# Patient Record
Sex: MALE | Race: WHITE | Employment: OTHER | URBAN - METROPOLITAN AREA
[De-identification: names, ages, dates, MRNs, and addresses within clinical notes are randomized per-mention and may not be internally consistent; named-entity substitution may affect disease eponyms.]

---

## 2017-08-10 ENCOUNTER — OFFICE VISIT (OUTPATIENT)
Dept: FAMILY MEDICINE CLINIC | Age: 62
End: 2017-08-10

## 2017-08-10 VITALS
SYSTOLIC BLOOD PRESSURE: 128 MMHG | BODY MASS INDEX: 30.38 KG/M2 | HEIGHT: 71 IN | WEIGHT: 217 LBS | HEART RATE: 79 BPM | RESPIRATION RATE: 14 BRPM | TEMPERATURE: 98 F | DIASTOLIC BLOOD PRESSURE: 78 MMHG

## 2017-08-10 DIAGNOSIS — Z00.00 ANNUAL PHYSICAL EXAM: ICD-10-CM

## 2017-08-10 DIAGNOSIS — Z12.5 PROSTATE CANCER SCREENING: ICD-10-CM

## 2017-08-10 DIAGNOSIS — N20.0 RENAL STONES: ICD-10-CM

## 2017-08-10 DIAGNOSIS — J30.2 SEASONAL ALLERGIC RHINITIS, UNSPECIFIED ALLERGIC RHINITIS TRIGGER: ICD-10-CM

## 2017-08-10 DIAGNOSIS — M10.9 GOUT OF MULTIPLE SITES, UNSPECIFIED CAUSE, UNSPECIFIED CHRONICITY: ICD-10-CM

## 2017-08-10 DIAGNOSIS — Z00.00 ANNUAL PHYSICAL EXAM: Primary | ICD-10-CM

## 2017-08-10 LAB
ALBUMIN SERPL-MCNC: 4.2 G/DL (ref 3.9–4.9)
ALP BLD-CCNC: 75 U/L (ref 35–104)
ALT SERPL-CCNC: 15 U/L (ref 0–41)
ANION GAP SERPL CALCULATED.3IONS-SCNC: 15 MEQ/L (ref 7–13)
AST SERPL-CCNC: 18 U/L (ref 0–40)
BILIRUB SERPL-MCNC: 1.4 MG/DL (ref 0–1.2)
BILIRUBIN, POC: NORMAL
BLOOD URINE, POC: NORMAL
BUN BLDV-MCNC: 22 MG/DL (ref 8–23)
CALCIUM SERPL-MCNC: 9.2 MG/DL (ref 8.6–10.2)
CHLORIDE BLD-SCNC: 103 MEQ/L (ref 98–107)
CHOLESTEROL, TOTAL: 173 MG/DL (ref 0–199)
CLARITY, POC: CLEAR
CO2: 26 MEQ/L (ref 22–29)
COLOR, POC: YELLOW
CREAT SERPL-MCNC: 0.81 MG/DL (ref 0.7–1.2)
GFR AFRICAN AMERICAN: >60
GFR NON-AFRICAN AMERICAN: >60
GLOBULIN: 2.9 G/DL (ref 2.3–3.5)
GLUCOSE BLD-MCNC: 88 MG/DL (ref 74–109)
GLUCOSE URINE, POC: NORMAL
HDLC SERPL-MCNC: 31 MG/DL (ref 40–59)
KETONES, POC: NORMAL
LDL CHOLESTEROL CALCULATED: 120 MG/DL (ref 0–129)
LEUKOCYTE EST, POC: NORMAL
NITRITE, POC: NORMAL
PH, POC: 6.5
POTASSIUM SERPL-SCNC: 4.5 MEQ/L (ref 3.5–5.1)
PROSTATE SPECIFIC ANTIGEN: 2.13 NG/ML (ref 0–5.4)
PROTEIN, POC: NORMAL
SODIUM BLD-SCNC: 144 MEQ/L (ref 132–144)
SPECIFIC GRAVITY, POC: 1.02
TOTAL PROTEIN: 7.1 G/DL (ref 6.4–8.1)
TRIGL SERPL-MCNC: 109 MG/DL (ref 0–200)
UROBILINOGEN, POC: NORMAL

## 2017-08-10 PROCEDURE — 81002 URINALYSIS NONAUTO W/O SCOPE: CPT | Performed by: FAMILY MEDICINE

## 2017-08-10 PROCEDURE — 99396 PREV VISIT EST AGE 40-64: CPT | Performed by: FAMILY MEDICINE

## 2017-08-10 RX ORDER — ALLOPURINOL 300 MG/1
TABLET ORAL
Qty: 90 TABLET | Refills: 3 | Status: SHIPPED | OUTPATIENT
Start: 2017-08-10 | End: 2018-08-13 | Stop reason: SDUPTHER

## 2017-08-10 RX ORDER — HYDROCHLOROTHIAZIDE 25 MG/1
TABLET ORAL
Qty: 90 TABLET | Refills: 3 | Status: SHIPPED | OUTPATIENT
Start: 2017-08-10 | End: 2018-08-13 | Stop reason: SDUPTHER

## 2017-08-10 RX ORDER — FLUTICASONE PROPIONATE 50 MCG
2 SPRAY, SUSPENSION (ML) NASAL DAILY
Qty: 3 BOTTLE | Refills: 3 | Status: SHIPPED | OUTPATIENT
Start: 2017-08-10 | End: 2018-08-13 | Stop reason: SDUPTHER

## 2017-08-10 ASSESSMENT — ENCOUNTER SYMPTOMS
ABDOMINAL PAIN: 0
BLOOD IN STOOL: 0
SINUS PRESSURE: 0
RHINORRHEA: 1
SORE THROAT: 0
SHORTNESS OF BREATH: 0

## 2017-08-10 ASSESSMENT — PATIENT HEALTH QUESTIONNAIRE - PHQ9
1. LITTLE INTEREST OR PLEASURE IN DOING THINGS: 0
2. FEELING DOWN, DEPRESSED OR HOPELESS: 0
SUM OF ALL RESPONSES TO PHQ QUESTIONS 1-9: 0
SUM OF ALL RESPONSES TO PHQ9 QUESTIONS 1 & 2: 0

## 2017-10-13 ENCOUNTER — OFFICE VISIT (OUTPATIENT)
Dept: FAMILY MEDICINE CLINIC | Age: 62
End: 2017-10-13

## 2017-10-13 VITALS
BODY MASS INDEX: 32.14 KG/M2 | DIASTOLIC BLOOD PRESSURE: 80 MMHG | OXYGEN SATURATION: 98 % | SYSTOLIC BLOOD PRESSURE: 138 MMHG | WEIGHT: 229.6 LBS | TEMPERATURE: 98 F | HEIGHT: 71 IN | HEART RATE: 84 BPM

## 2017-10-13 DIAGNOSIS — M67.441 GANGLION CYST OF JOINT OF FINGER OF RIGHT HAND: ICD-10-CM

## 2017-10-13 DIAGNOSIS — L03.032 ACUTE PARONYCHIA OF TOE OF LEFT FOOT: Primary | ICD-10-CM

## 2017-10-13 PROCEDURE — 3017F COLORECTAL CA SCREEN DOC REV: CPT | Performed by: FAMILY MEDICINE

## 2017-10-13 PROCEDURE — G8484 FLU IMMUNIZE NO ADMIN: HCPCS | Performed by: FAMILY MEDICINE

## 2017-10-13 PROCEDURE — G8417 CALC BMI ABV UP PARAM F/U: HCPCS | Performed by: FAMILY MEDICINE

## 2017-10-13 PROCEDURE — G8427 DOCREV CUR MEDS BY ELIG CLIN: HCPCS | Performed by: FAMILY MEDICINE

## 2017-10-13 PROCEDURE — 99213 OFFICE O/P EST LOW 20 MIN: CPT | Performed by: FAMILY MEDICINE

## 2017-10-13 PROCEDURE — 1036F TOBACCO NON-USER: CPT | Performed by: FAMILY MEDICINE

## 2017-10-13 RX ORDER — CEFADROXIL 500 MG/1
500 CAPSULE ORAL 2 TIMES DAILY
Qty: 14 CAPSULE | Refills: 0 | Status: SHIPPED | OUTPATIENT
Start: 2017-10-13 | End: 2017-10-20

## 2017-10-13 NOTE — PATIENT INSTRUCTIONS
Patient Education        Paronychia: Care Instructions  Your Care Instructions  Paronychia (say \"ppyh-pn-AI-aleta-uh\") is an infection of the skin around a fingernail or toenail. It happens when germs enter through a break in the skin. The doctor may have made a small cut in the infected area to drain the pus. Most cases of paronychia improve in a few days. But watch your symptoms and follow your doctor's advice. Though rare, a mild case can turn into something more serious and infect your entire finger or toe. Also, it is possible for an infection to return. Follow-up care is a key part of your treatment and safety. Be sure to make and go to all appointments, and call your doctor if you are having problems. It's also a good idea to know your test results and keep a list of the medicines you take. How can you care for yourself at home? · If your doctor told you how to care for your infected nail, follow the doctor's instructions. If you did not get instructions, follow this general advice:  ¨ Wash the area with clean water 2 times a day. Don't use hydrogen peroxide or alcohol, which can slow healing. ¨ You may cover the area with a thin layer of petroleum jelly, such as Vaseline, and a nonstick bandage. ¨ Apply more petroleum jelly and replace the bandage as needed. · If your doctor prescribed antibiotics, take them as directed. Do not stop taking them just because you feel better. You need to take the full course of antibiotics. · Take an over-the-counter pain medicine, such as acetaminophen (Tylenol), ibuprofen (Advil, Motrin), or naproxen (Aleve). Read and follow all instructions on the label. · Do not take two or more pain medicines at the same time unless the doctor told you to. Many pain medicines have acetaminophen, which is Tylenol. Too much acetaminophen (Tylenol) can be harmful. · Prop up the toe or finger so that it is higher than the level of your heart.  This will help with pain and

## 2018-02-19 ENCOUNTER — HOSPITAL ENCOUNTER (OUTPATIENT)
Dept: GENERAL RADIOLOGY | Age: 63
Discharge: HOME | End: 2018-02-21
Payer: COMMERCIAL

## 2018-02-19 ENCOUNTER — OFFICE VISIT (OUTPATIENT)
Dept: FAMILY MEDICINE CLINIC | Age: 63
End: 2018-02-19
Payer: COMMERCIAL

## 2018-02-19 DIAGNOSIS — M54.17 LUMBOSACRAL RADICULOPATHY AT S1: ICD-10-CM

## 2018-02-19 DIAGNOSIS — M47.27 LUMBOSACRAL RADICULOPATHY DUE TO DEGENERATIVE JOINT DISEASE OF SPINE: ICD-10-CM

## 2018-02-19 DIAGNOSIS — M47.27 LUMBOSACRAL RADICULOPATHY DUE TO DEGENERATIVE JOINT DISEASE OF SPINE: Primary | ICD-10-CM

## 2018-02-19 DIAGNOSIS — G89.29 CHRONIC LEFT-SIDED LOW BACK PAIN WITH SCIATICA, SCIATICA LATERALITY UNSPECIFIED: ICD-10-CM

## 2018-02-19 DIAGNOSIS — M54.40 CHRONIC LEFT-SIDED LOW BACK PAIN WITH SCIATICA, SCIATICA LATERALITY UNSPECIFIED: ICD-10-CM

## 2018-02-19 PROCEDURE — G8427 DOCREV CUR MEDS BY ELIG CLIN: HCPCS | Performed by: FAMILY MEDICINE

## 2018-02-19 PROCEDURE — 72110 X-RAY EXAM L-2 SPINE 4/>VWS: CPT

## 2018-02-19 PROCEDURE — 99213 OFFICE O/P EST LOW 20 MIN: CPT | Performed by: FAMILY MEDICINE

## 2018-02-19 PROCEDURE — 3017F COLORECTAL CA SCREEN DOC REV: CPT | Performed by: FAMILY MEDICINE

## 2018-02-19 PROCEDURE — G8484 FLU IMMUNIZE NO ADMIN: HCPCS | Performed by: FAMILY MEDICINE

## 2018-02-19 PROCEDURE — 1036F TOBACCO NON-USER: CPT | Performed by: FAMILY MEDICINE

## 2018-02-19 PROCEDURE — G8417 CALC BMI ABV UP PARAM F/U: HCPCS | Performed by: FAMILY MEDICINE

## 2018-02-19 RX ORDER — EFINACONAZOLE 100 MG/ML
SOLUTION TOPICAL
Refills: 3 | COMMUNITY
Start: 2018-02-03

## 2018-02-19 RX ORDER — GABAPENTIN 300 MG/1
CAPSULE ORAL
Qty: 30 CAPSULE | Refills: 2 | Status: SHIPPED | OUTPATIENT
Start: 2018-02-19 | End: 2018-03-29 | Stop reason: SDUPTHER

## 2018-02-19 RX ORDER — PREDNISONE 20 MG/1
TABLET ORAL
Qty: 18 TABLET | Refills: 0 | Status: SHIPPED | OUTPATIENT
Start: 2018-02-19 | End: 2018-03-29 | Stop reason: ALTCHOICE

## 2018-02-20 ENCOUNTER — PATIENT MESSAGE (OUTPATIENT)
Dept: FAMILY MEDICINE CLINIC | Age: 63
End: 2018-02-20

## 2018-02-20 DIAGNOSIS — Z23 NEED FOR SHINGLES VACCINE: ICD-10-CM

## 2018-02-20 DIAGNOSIS — Z23 NEED FOR TDAP VACCINATION: Primary | ICD-10-CM

## 2018-02-21 NOTE — TELEPHONE ENCOUNTER
From: Nat Guzman  To: Ritu Paulino DO  Sent: 2/20/2018 7:13 PM EST  Subject: Non-Urgent Medical Question     I have a follow up appointment with Dr Cedric Nielsen on March 29. I see that Im due for hepatitis C screening, a tetanus booster, and I dont know exactly what this shingles vaccine is? Is that something you can also address on the 29th?  Nat Guzman

## 2018-02-26 VITALS
TEMPERATURE: 97.9 F | HEART RATE: 86 BPM | WEIGHT: 210 LBS | SYSTOLIC BLOOD PRESSURE: 132 MMHG | HEIGHT: 71 IN | BODY MASS INDEX: 29.4 KG/M2 | RESPIRATION RATE: 16 BRPM | DIASTOLIC BLOOD PRESSURE: 82 MMHG

## 2018-02-26 ASSESSMENT — ENCOUNTER SYMPTOMS
BLOOD IN STOOL: 0
DIARRHEA: 0
SHORTNESS OF BREATH: 0
ABDOMINAL PAIN: 0
BACK PAIN: 1

## 2018-02-28 ENCOUNTER — HOSPITAL ENCOUNTER (OUTPATIENT)
Dept: PHYSICAL THERAPY | Age: 63
Setting detail: THERAPIES SERIES
Discharge: HOME | End: 2018-02-28
Payer: COMMERCIAL

## 2018-02-28 PROCEDURE — 97162 PT EVAL MOD COMPLEX 30 MIN: CPT

## 2018-02-28 PROCEDURE — 97110 THERAPEUTIC EXERCISES: CPT

## 2018-02-28 ASSESSMENT — PAIN DESCRIPTION - LOCATION: LOCATION: BACK;LEG

## 2018-02-28 ASSESSMENT — PAIN DESCRIPTION - PAIN TYPE: TYPE: CHRONIC PAIN

## 2018-02-28 ASSESSMENT — PAIN SCALES - GENERAL: PAINLEVEL_OUTOF10: 3

## 2018-02-28 ASSESSMENT — PAIN DESCRIPTION - DESCRIPTORS: DESCRIPTORS: ACHING;DULL

## 2018-02-28 ASSESSMENT — PAIN DESCRIPTION - ORIENTATION: ORIENTATION: LEFT

## 2018-02-28 ASSESSMENT — PAIN DESCRIPTION - FREQUENCY: FREQUENCY: CONTINUOUS

## 2018-02-28 NOTE — PLAN OF CARE
[address]     Ph: [phone]  Fax: [phone]    [] Certification  [] Recertification [x]  Plan of Care  [] Progress Note [] Discharge      To:  Referring Practitioner: Dr. Corinna Bah        From:  Matthew Mireles, PT  Patient: Enrique Prasad [facility]          : 1955  Diagnosis: lumbosacral radiculopathy due to DJD of spine, chronic left sided LBP with scicatica             Date: 2018  Treatment Diagnosis: LBP, Lt LE pain, decreased ROM, LE weakness       Progress Report Period from:  2018  to 2018    Total # of Visits to Date: 1   No Show: 0    Canceled Appointment: 0     OBJECTIVE:   Short Term Goals - Time Frame for Short term goals: 3 wks     Goals Current/Discharge status  Met   Short term goal 1: Independent with HEP   Need for HEP  [] yes  [] no   Short term goal 2: Report 50% improvement with symptoms to improve ability to stand at school and prepare meals   C/o symptoms with prolonged static stand and increased activity  [] yes  [] no     Long Term Goals - Time Frame for Long term goals : 5 wks   Goals Current/ Discharge status Met   Long term goal 1: Improve LE strength >/= 4+/5 to allow increased ease with prolonged standing  Strength RLE  Strength RLE: Exception  R Hip Flexion: 4+/5  R Hip Extension: 4+/5  R Hip ABduction: 4+/5  R Hip Internal Rotation: 4+/5  R Hip External Rotation: 4+/5  R Knee Flexion: 5/5  R Knee Extension: 5/5  R Ankle Dorsiflexion: 5/5  Strength LLE  Strength LLE: Exception  L Hip Flexion: 4+/5  L Hip Extension: 4+/5  L Hip ABduction: 4+/5  L Hip Internal Rotation: 4+/5  L Hip External Rotation: 4+/5  L Knee Flexion: 5/5  L Knee Extension: 5/5  L Ankle Dorsiflexion: 5/5     [] yes  [] no   Long term goal 2: Improve LE and lumbar ROM WFL to allow increased ease with bending and lifting  PROM RLE (degrees)  RLE PROM: Exceptions  R SLR: 71  R Hip External Rotation 0-45: 31  R Hip Internal Rotation 0-45: 34     PROM LLE (degrees)  LLE PROM: Exceptions  L SLR: 60  L Hip External Rotation 0-45: 31  L Hip Internal Rotation 0-45: 35     PROM RUE (degrees)  RUE PROM: Exceptions      Spine  Lumbar: ext 5 with increased pain, lumbar 54, Rt SB 45, Lt SB 34  [] yes  [] no   Long term goal 3: Pt will demonstrate proper body mechanics for lifting and gardening  Need for education [] yes  [] no   Long term goal 4: Oswestry </= 4 to demonstrate improved tolerance to functional activities  Oswestry: 12  [] yes  [] no       Body structures, Functions, Activity limitations: Decreased functional mobility , Decreased ROM, Decreased strength, Decreased coordination  Assessment: Pt presents with LBP and LE pain Lt > Rt. Reports improving since onset of Prednisone pack and Gabapentin. Concerned about functional limitations due to pain and limited ROM. Noted generalized tightness and decreased ROM. 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  Increased pain with standing extension, but does report relief with walking. Worsening of symptoms with prolonged standign. Also reports injury to Rt calf, which was untreated and h/o Lt fibular fx. Pt would benefit from skilled PT to address deficits. Prognosis: Good    New Education Provided: HEP, POC   G-Codes     PLAN: [x] Evaluate and Treat  Frequency/Duration:  Plan  Times per week: 2  Plan weeks: 5  Current Treatment Recommendations: Strengthening, ROM, Manual Therapy - Soft Tissue Mobilization, Manual Therapy - Joint Manipulation, Home Exercise Program, Safety Education & Training, Modalities, Integrated Dry Needling  Plan Comment: Transfer care of pt to Alexander Elma, PT      Precautions:             ?     Patient Status:[x] Continue/ Initiate plan of Care    [] Discharge PT. Recommend pt continue with HEP.      [] Additional visits requested, Please re-certify for additional visits:        Signature: Electronically signed by Keysha Aguillon, PT on 2/28/18 at 5:15 PM    If you have any questions or concerns, please don't hesitate to call. Thank you for your referral.    I have reviewed this plan of care and certify a need for medically necessary rehabilitation services.     Physician Signature:__________________________________________________________  Date:  Please sign and return

## 2018-02-28 NOTE — PROGRESS NOTES
current facility-administered medications on file prior to encounter. Subjective  Subjective: Pt reports onset of pain in May. Saw chiropractor who \"cracked\" lower back prior to onset of pain. Saw chiropractor after onset of pain through the end of the year, consisting of massage and manual.  Sharp pains in LEs, which were excruciating. States difficulty with ambulation at times. Pt reports some relief with pressure on Lt fibular head. Some temporary relief with stretches. Pt advised to wear bilateral compression stockings with no difference. Given steroid pack with some relief. Taking Gabapentin with sig relief of sharp pains. Now noticing dull ache in low back. Comments: Pt reports Lt distal fibula fx 30 yrs ago. 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 Pt reports \"rolled up\" gastroc, but did not seek medical attention.    Additional Pertinent Hx: gout  Pain Screening  Patient Currently in Pain: Yes  Pain Assessment  Pain Assessment: 0-10  Pain Level: 3 (Range: 1-6/10)  Pain Type: Chronic pain  Pain Location: Back, Leg  Pain Orientation: Left  Pain Radiating Towards: posterior left knee, iliac crests, fibular head, Lt plantar surface of foot   Pain Descriptors: Aching, Dull (formerly sharp )  Pain Frequency: Continuous  Effect of Pain on Daily Activities: difficulty with prolonged static stand; relief with prolonged walking   Pain Intervention(s): Medication (see eMar), Heat applied (steroid pack, Gabapentin )    Social/Functional History  Lives With: Spouse  Type of Home: House  Home Layout: Two level  ADL Assistance: Independent  Homemaking Assistance: Independent (difficulty with prolonged standing to cook, working in [employer] )  [employer] Responsibilities: Yes  Ambulation Assistance: Independent  Transfer Assistance: Independent  Active : Yes  Mode of Transportation: Car  Occupation: Full time employment  Type of occupation: high school    Leisure & Hobbies: wood shop, gardening   Additional Comments: difficulty with initial sit to stand after prolonged sitting          Objective  Vision  Vision: Within Functional Limits  Hearing  Hearing: Within functional limits (some diff with differentiating noises )  Observation/Palpation  Posture: Fair (Lt concavity with elevated Lt iliac crest and lower Lt inferior border of scap, rounded shoulders with Lt scapular winging )  Palpation: tenderness T12/ L1, Lt PSIS, Lt gluteal/ piriformis area; some discomfort with Lt pelvic rotation - denies with compression and distraction  Observation: overall tightness, difficulty relaxing     Strength RLE  Strength RLE: Exception  R Hip Flexion: 4+/5  R Hip Extension: 4+/5  R Hip ABduction: 4+/5  R Hip Internal Rotation: 4+/5  R Hip External Rotation: 4+/5  R Knee Flexion: 5/5  R Knee Extension: 5/5  R Ankle Dorsiflexion: 5/5  Strength LLE  Strength LLE: Exception  L Hip Flexion: 4+/5  L Hip Extension: 4+/5  L Hip ABduction: 4+/5  L Hip Internal Rotation: 4+/5  L Hip External Rotation: 4+/5  L Knee Flexion: 5/5  L Knee Extension: 5/5  L Ankle Dorsiflexion: 5/5  PROM RLE (degrees)  RLE PROM: Exceptions  R SLR: 71  R Hip External Rotation 0-45: 31  R Hip Internal Rotation 0-45: 34     PROM LLE (degrees)  LLE PROM: Exceptions  L SLR: 60  L Hip External Rotation 0-45: 31  L Hip Internal Rotation 0-45: 35     PROM RUE (degrees)  RUE PROM: Exceptions           Spine  Lumbar: ext 5 with increased pain, lumbar 54, Rt SB 45, Lt SB 34   Joint Mobility  Spine: decreased pelvic mobility, decreased PA mobility                 Sensation  Overall Sensation Status: Impaired (some c/o N&T with stretches in Lt foot/ toe )   Bed mobility  Rolling to Left: Independent  Rolling to Right: Independent  Supine to Sit: Independent  Sit to Supine: Independent     Transfers  Sit to Stand: Independent  Stand to sit:  Independent  Ambulation 1  Surface: carpet  Device: No Device  Assistance: Independent  Quality of Gait: mild increased DANG with decreased trunk rotation and arm swing  Distance: unlimited distance within clinic         Balance  Sitting - Static: Good  Sitting - Dynamic: Good  Standing - Static: Good  Standing - Dynamic: Good             Exercises:   Exercises  Exercise 1: Pt performs TFL stretch in supine, supine pifiromis str; advised to avoid standing hams stretch reaching to floor   Exercise 2: LTR 5s/10   Exercise 3: pelvic tilts 5s/10   Exercise 4: seated piriformis str   Exercise 5: ** hams str   Exercise 6: ** 3 way SLR  Exercise 7: ** SKTC, DKTC   Exercise 20: HEP: pelvic tilts, LTR, seated piriformis str     Manual:  Manual therapy  Joint mobilization: ** Gr I, II PA lumbar mobs   PROM: ** hams str, nerve glides      ** indicates treatment to be performed at future treatment     POST-PAIN    Pain Rating (0-10 pain scale): 4-5  Location and Pain Description same as pre-pain unless otherwise indicated. Action: [] NA  [] Call Physician  [x] Perform HEP  [x] Meds as prescribed     Assessment   Conditions Requiring Skilled Therapeutic Intervention  Body structures, Functions, Activity limitations: Decreased functional mobility , Decreased ROM, Decreased strength, Decreased coordination  Assessment: Pt presents with LBP and LE pain Lt > Rt. Reports improving since onset of Prednisone pack and Gabapentin. Concerned about functional limitations due to pain and limited ROM. Noted generalized tightness and decreased ROM. 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  Increased pain with standing extension, but does report relief with walking. Worsening of symptoms with prolonged standign. Also reports injury to Rt calf, which was untreated and h/o Lt fibular fx. Pt would benefit from skilled PT to address deficits.     Treatment Diagnosis: LBP, Lt LE pain, decreased ROM, LE weakness  Prognosis: Good  Decision Making: Medium Complexity  History: personal Score   History of Falls [] Yes  [x] No 25  0 0   Secondary Diagnosis [] Yes  [x] No 15  0 0   Ambulatory Aid [] Furniture  [] Crutches/cane/walker  [x] None/bedrest/wheelchair/nurse 30  15  0 0   IV/Heparin Lock [] Yes  [x] No 20  0 0   Gait/Transferring [] Impaired  [] Weak  [x] Normal/bedrest/immobile 20  10  0 0   Mental Status [] Forgets limitations  [x] Oriented to own ability 15  0 0      Total: 0     Based on the Assessment score: check the appropriate box.   [x]  No intervention needed   Low =   Score of 0-24  []  Use standard prevention interventions Moderate =  Score of 24-44   [] Discuss fall prevention strategies   [] Indicate moderate falls risk on eval  []  Use high risk prevention interventions High = Score of 45 and higher   [] Discuss fall prevention strategies   [] Provide supervision during treatment time

## 2018-03-05 ENCOUNTER — HOSPITAL ENCOUNTER (OUTPATIENT)
Dept: PHYSICAL THERAPY | Age: 63
Setting detail: THERAPIES SERIES
Discharge: HOME | End: 2018-03-05
Payer: COMMERCIAL

## 2018-03-05 PROCEDURE — 97110 THERAPEUTIC EXERCISES: CPT

## 2018-03-05 ASSESSMENT — PAIN SCALES - GENERAL: PAINLEVEL_OUTOF10: 3

## 2018-03-05 ASSESSMENT — PAIN DESCRIPTION - PAIN TYPE: TYPE: ACUTE PAIN

## 2018-03-05 ASSESSMENT — PAIN DESCRIPTION - LOCATION: LOCATION: BACK

## 2018-03-05 ASSESSMENT — PAIN DESCRIPTION - ORIENTATION: ORIENTATION: LOWER

## 2018-03-09 ENCOUNTER — HOSPITAL ENCOUNTER (OUTPATIENT)
Dept: PHYSICAL THERAPY | Age: 63
Setting detail: THERAPIES SERIES
Discharge: HOME | End: 2018-03-09
Payer: COMMERCIAL

## 2018-03-09 PROCEDURE — 97110 THERAPEUTIC EXERCISES: CPT

## 2018-03-09 ASSESSMENT — PAIN SCALES - GENERAL: PAINLEVEL_OUTOF10: 8

## 2018-03-09 ASSESSMENT — PAIN DESCRIPTION - ORIENTATION: ORIENTATION: LOWER;RIGHT;LEFT

## 2018-03-09 ASSESSMENT — PAIN DESCRIPTION - DESCRIPTORS: DESCRIPTORS: CRAMPING;TIGHTNESS

## 2018-03-09 ASSESSMENT — PAIN DESCRIPTION - PAIN TYPE: TYPE: CHRONIC PAIN

## 2018-03-09 ASSESSMENT — PAIN DESCRIPTION - LOCATION: LOCATION: LEG

## 2018-03-12 ENCOUNTER — HOSPITAL ENCOUNTER (OUTPATIENT)
Dept: PHYSICAL THERAPY | Age: 63
Setting detail: THERAPIES SERIES
Discharge: HOME | End: 2018-03-12
Payer: COMMERCIAL

## 2018-03-12 PROCEDURE — 97110 THERAPEUTIC EXERCISES: CPT

## 2018-03-12 ASSESSMENT — PAIN DESCRIPTION - FREQUENCY: FREQUENCY: CONTINUOUS

## 2018-03-12 ASSESSMENT — PAIN DESCRIPTION - DESCRIPTORS: DESCRIPTORS: CRAMPING;TIGHTNESS

## 2018-03-12 ASSESSMENT — PAIN SCALES - GENERAL: PAINLEVEL_OUTOF10: 5

## 2018-03-12 ASSESSMENT — PAIN DESCRIPTION - ORIENTATION: ORIENTATION: RIGHT;LEFT;LOWER

## 2018-03-12 ASSESSMENT — PAIN DESCRIPTION - LOCATION: LOCATION: LEG;BACK

## 2018-03-12 ASSESSMENT — PAIN DESCRIPTION - PAIN TYPE: TYPE: CHRONIC PAIN

## 2018-03-12 NOTE — PROGRESS NOTES
[id number] [address]  Outpatient Physical Therapy    Treatment Note        Date: 3/12/2018  Patient: Trenton Barnett  : 1955  ACCT #: [de-identified]  Referring Practitioner: Dr. Shannan Bill   Diagnosis: lumbosacral radiculopathy due to DJD of spine, chronic left sided LBP with scicatica     Visit Information:  PT Visit Information  PT Insurance Information: MMO  Total # of Visits Approved:  (medical necessity, $25 copay )  Total # of Visits to Date: 4  No Show: 0  Canceled Appointment: 0  Progress Note Counter: 4/10     Subjective: Pain in  and LB 2/10. Pt states he saw his xray results for his LB and has an appt with the dr. Romana Aris Compliance:  [x] Good [] Fair [] Poor [] Reports not doing due to:    Vital Signs  Patient Currently in Pain: Yes   Pain Screening  Patient Currently in Pain: Yes  Pain Assessment  Pain Assessment: 0-10  Pain Level: 5  Pain Type: Chronic pain  Pain Location: Leg;Back  Pain Orientation: Right;Left;Lower  Pain Descriptors: Cramping;Tightness  Pain Frequency: Continuous    OBJECTIVE:   Exercises  Exercise 1: supine with one LE crossed over other, shoulders remain flat on mat, 3 x 20 sec  Exercise 2: LTR 10s/10   Exercise 3: pelvic tilts 5s/15   Exercise 4:  piriformis str knee to opposite shld 3x30\"   Exercise 5:  hamstring stretch 3x30, longsit  Exercise 6: 3 way SLR x10 ea   Exercise 7: SKTC, DKTC  20\"x 3  Exercise 8: modified zackery stretch 3x30\"   Exercise 20: HEP: SKTC, DKTC      Strength: [x] NT  [] MMT completed:     ROM: [x] NT  [] ROM measurements:      Manual: n/a       Modalities: n/a        *Indicates exercise, modality, or manual techniques to be initiated when appropriate    Assessment: Body structures, Functions, Activity limitations: Decreased functional mobility , Decreased ROM, Decreased strength, Decreased coordination  Assessment: Pt good tolerance to ex with verbal cues. Pain increased with Mod Zackery stretch in Lt LB/hip.  Initiated Carol Ann Sage 45 to

## 2018-03-15 ENCOUNTER — HOSPITAL ENCOUNTER (OUTPATIENT)
Dept: PHYSICAL THERAPY | Age: 63
Setting detail: THERAPIES SERIES
Discharge: HOME | End: 2018-03-15
Payer: COMMERCIAL

## 2018-03-15 PROCEDURE — 97110 THERAPEUTIC EXERCISES: CPT

## 2018-03-15 ASSESSMENT — PAIN DESCRIPTION - DESCRIPTORS: DESCRIPTORS: TIGHTNESS;CRAMPING

## 2018-03-15 ASSESSMENT — PAIN DESCRIPTION - FREQUENCY: FREQUENCY: INTERMITTENT

## 2018-03-15 ASSESSMENT — PAIN DESCRIPTION - PAIN TYPE: TYPE: CHRONIC PAIN

## 2018-03-15 ASSESSMENT — PAIN SCALES - GENERAL: PAINLEVEL_OUTOF10: 9

## 2018-03-19 ENCOUNTER — HOSPITAL ENCOUNTER (OUTPATIENT)
Dept: PHYSICAL THERAPY | Age: 63
Setting detail: THERAPIES SERIES
Discharge: HOME | End: 2018-03-19
Payer: COMMERCIAL

## 2018-03-19 PROCEDURE — 97110 THERAPEUTIC EXERCISES: CPT

## 2018-03-19 PROCEDURE — 97035 APP MDLTY 1+ULTRASOUND EA 15: CPT

## 2018-03-19 ASSESSMENT — PAIN DESCRIPTION - LOCATION: LOCATION: LEG;BACK

## 2018-03-19 ASSESSMENT — PAIN DESCRIPTION - DESCRIPTORS: DESCRIPTORS: STABBING;SHOOTING;TIGHTNESS

## 2018-03-19 ASSESSMENT — PAIN DESCRIPTION - PAIN TYPE: TYPE: CHRONIC PAIN

## 2018-03-19 NOTE — PROGRESS NOTES
[id number] [address]  Outpatient Physical Therapy    Treatment Note        Date: 3/19/2018  Patient: Steven Khan  : 1955  ACCT #: [de-identified]  Referring Practitioner: Dr. Bhargav Davidson   Diagnosis: lumbosacral radiculopathy due to DJD of spine, chronic left sided LBP with scicatica     Visit Information:  PT Visit Information  PT Insurance Information: MMO  Total # of Visits Approved:  (medical necessity, $25 copay )  Total # of Visits to Date: 6  No Show: 0  Canceled Appointment: 0  Progress Note Counter: 6/10     Subjective: pain is 8/10 in left calf, I still will get spasms radiating if I go into LX ext  Comments: RTD 3  HEP Compliance:  [x] Good [] Fair [] Poor [] Reports not doing due to:    Vital Signs  Patient Currently in Pain: Yes   Pain Screening  Patient Currently in Pain: Yes  Pain Assessment  Pain Type: Chronic pain  Pain Location: Leg;Back  Pain Descriptors: Stabbing; Shooting;Tightness    OBJECTIVE:   Exercises  Exercise 2: LTR 10s/10   Exercise 3: TA breathes 5 sec x 15  Exercise 4: piriformis stretch 3 x 20 sec, b/l, supine  Exercise 13: Ab walkouts x 10  Exercise 14: DLS x 10,  3-way  Exercise 15: gastroc stretch 3 x 30 sec, standing  Exercise 16: planks 3 x 30 sec     Strength: [x] NT  [] MMT completed:   ROM: [x] NT  [] ROM measurements:        Modalities:  Modalities  Ultrasound: U/S 1Mhz at 1.5 w/cm2, left calf, 5 min     *Indicates exercise, modality, or manual techniques to be initiated when appropriate    Assessment:         Body structures, Functions, Activity limitations: Decreased functional mobility , Decreased ROM, Decreased strength, Decreased coordination  Assessment: added core stabilization ex, good technique with planks, and also U/S to left calf, per PT,  reduced pain stated post tx  Treatment Diagnosis: LBP, Lt LE pain, decreased ROM, LE weakness  Prognosis: Good  Patient Education: HEP, POC     Goals:  Short term goals  Time Frame for Short term goals: 3 wks   Short term goal 1: Independent with HEP   Short term goal 2: Report 50% improvement with symptoms to improve ability to stand at school and prepare meals     Long term goals  Time Frame for Long term goals : 5 wks   Long term goal 1: Improve LE strength >/= 4+/5 to allow increased ease with prolonged standing   Long term goal 2: Improve LE and lumbar ROM WFL to allow increased ease with bending and lifting   Long term goal 3: Pt will demonstrate proper body mechanics for lifting and gardening   Long term goal 4: Oswestry </= 4 to demonstrate improved tolerance to functional activities   Progress toward goals:ongoing    POST-PAIN       Pain Rating (0-10 pain scale):  4 /10   Location and pain description same as pre-treatment unless indicated. Action: [] NA   [x] Perform HEP  [] Meds as prescribed  [] Modalities as prescribed   [] Call Physician     Frequency/Duration:  Plan  Times per week: 2  Plan weeks: 5  Current Treatment Recommendations: Strengthening, ROM, Manual Therapy - Soft Tissue Mobilization, Manual Therapy - Joint Manipulation, Home Exercise Program, Safety Education & Training, Modalities, Integrated Dry Needling  Plan Comment: Transfer care of pt to Seth Cormeir, PT      Pt to continue current HEP. See objective section for any therapeutic exercise changes, additions or modifications this date.          PT Individual Minutes  Time In: 3975  Time Out: 6028  Minutes: 38  Timed Code Treatment Minutes: 38 Minutes  Procedure Minutes:0    Signature:  Electronically signed by Tolu Starkey PTA on 3/19/18 at 4:23 PM

## 2018-03-22 ENCOUNTER — HOSPITAL ENCOUNTER (OUTPATIENT)
Dept: PHYSICAL THERAPY | Age: 63
Setting detail: THERAPIES SERIES
Discharge: HOME | End: 2018-03-22
Payer: COMMERCIAL

## 2018-03-22 PROCEDURE — 97035 APP MDLTY 1+ULTRASOUND EA 15: CPT

## 2018-03-22 PROCEDURE — 97110 THERAPEUTIC EXERCISES: CPT

## 2018-03-22 PROCEDURE — 97140 MANUAL THERAPY 1/> REGIONS: CPT

## 2018-03-22 ASSESSMENT — PAIN DESCRIPTION - PAIN TYPE: TYPE: CHRONIC PAIN

## 2018-03-22 ASSESSMENT — PAIN SCALES - GENERAL: PAINLEVEL_OUTOF10: 7

## 2018-03-22 ASSESSMENT — PAIN DESCRIPTION - DESCRIPTORS: DESCRIPTORS: SHOOTING

## 2018-03-22 ASSESSMENT — PAIN DESCRIPTION - ORIENTATION: ORIENTATION: LEFT

## 2018-03-22 ASSESSMENT — PAIN DESCRIPTION - LOCATION: LOCATION: BACK;LEG

## 2018-03-26 ENCOUNTER — HOSPITAL ENCOUNTER (OUTPATIENT)
Dept: PHYSICAL THERAPY | Age: 63
Setting detail: THERAPIES SERIES
Discharge: HOME | End: 2018-03-26
Payer: COMMERCIAL

## 2018-03-26 PROCEDURE — 97140 MANUAL THERAPY 1/> REGIONS: CPT

## 2018-03-26 PROCEDURE — 97035 APP MDLTY 1+ULTRASOUND EA 15: CPT

## 2018-03-26 PROCEDURE — 97110 THERAPEUTIC EXERCISES: CPT

## 2018-03-26 ASSESSMENT — PAIN DESCRIPTION - DESCRIPTORS: DESCRIPTORS: SORE

## 2018-03-26 ASSESSMENT — PAIN DESCRIPTION - LOCATION: LOCATION: BACK;LEG

## 2018-03-26 ASSESSMENT — PAIN DESCRIPTION - PAIN TYPE: TYPE: CHRONIC PAIN

## 2018-03-26 ASSESSMENT — PAIN SCALES - GENERAL: PAINLEVEL_OUTOF10: 4

## 2018-03-26 ASSESSMENT — PAIN DESCRIPTION - ORIENTATION: ORIENTATION: LEFT;RIGHT;LOWER

## 2018-03-26 NOTE — PROGRESS NOTES
[id number] [address]  Outpatient Physical Therapy    Treatment Note        Date: 3/26/2018  Patient: Nat Guzman  : 1955  ACCT #: [de-identified]  Referring Practitioner: Dr. Ritu Paulino   Diagnosis: lumbosacral radiculopathy due to DJD of spine, chronic left sided LBP with scicatica     Visit Information:  PT Visit Information  PT Insurance Information: MMO  Total # of Visits Approved:  (medical necessity, $25 copay )  Total # of Visits to Date: 8  No Show: 0  Canceled Appointment: 0  Progress Note Counter: 8/10     Subjective: pain is 2-3 in LB, and 4/10 b/l LE's, calf, I think the needling helped for about a day, I would like to try it again, and I bought a knee brace  Comments: RTD 3-  HEP Compliance:  [x] Good [] Fair [] Poor [] Reports not doing due to:    Vital Signs  Patient Currently in Pain: Yes   Pain Screening  Patient Currently in Pain: Yes  Pain Assessment  Pain Level: 4  Pain Type: Chronic pain  Pain Location: Back;Leg  Pain Orientation: Left;Right; Lower  Pain Descriptors: Sore    OBJECTIVE:   Exercises  Exercise 4: piriformis stretch 3 x 20 sec, b/l, seated  Exercise 13:  Ab walkouts x 15  Exercise 14: DLS x 10,  3-way       Strength: [] NT  [x] MMT completed:  Strength RLE  R Hip Flexion: 5/5  R Hip Extension: NT  R Hip ABduction: 4+/5  R Hip Internal Rotation: 4/5  R Hip External Rotation: 4+/5  R Knee Flexion: 4+/5  R Knee Extension: 5/5  Strength LLE  L Hip Flexion: 5/5  L Hip Extension: NT  L Hip ABduction: 4+/5  L Hip Internal Rotation: 4/5  L Hip External Rotation: 4+/5  L Knee Flexion: 4+/5  L Knee Extension: 5/5             ROM: [] NT  [x] ROM measurements:     AROM RLE (degrees)  RLE General AROM: SLR 68 deg     AROM LLE (degrees)  LLE General AROM: SLR 82 deg              Spine  Lumbar: ext, NT, flexion-WNL, SB-superior patella, b/l  Manual:   Manual therapy  Other: IDN to normalize tissue dysfunction and improve NM mechanics (see homeostatic chart to be scanned into EMR upon D/C

## 2018-03-26 NOTE — PROGRESS NOTES
Nicole Meneses [address]     Ph: [phone]  Fax: [phone]    [] Certification  [] Recertification []  Plan of Care  [x] Progress Note [] Discharge      To:  Referring Practitioner: Dr. Federico Rosenthal       From:  Kaushik Sethi, MARVA  Patient: Adam Silveira [facility]     : 1955  Diagnosis: lumbosacral radiculopathy due to DJD of spine, chronic left sided LBP with scicatica      Date: 3/26/2018  Treatment Diagnosis: LBP, Lt LE pain, decreased ROM, LE weakness       Progress Report Period from:  2018  to 3/26/2018    Total # of Visits to Date: 8   No Show: 0    Canceled Appointment: 0     OBJECTIVE:   Short Term Goals - Time Frame for Short term goals: 3 wks     Goals Current/Discharge status  Met   Short term goal 1: Independent with HEP   indep with HEP [x] yes  [] no   Short term goal 2: Report 50% improvement with symptoms to improve ability to stand at school and prepare meals   Some improvement stated, could not be specific [] yes  [x] no     Long Term Goals - Time Frame for Long term goals : 5 wks   Goals Current/ Discharge status Met   Long term goal 1: Improve LE strength >/= 4+/5 to allow increased ease with prolonged standing  Strength RLE  R Hip Flexion: 5/5  R Hip Extension: NT  R Hip ABduction: 4+/5  R Hip Internal Rotation: 4/5  R Hip External Rotation: 4+/5  R Knee Flexion: 4+/5  R Knee Extension: 5/5  Strength LLE  L Hip Flexion: 5/5  L Hip Extension: NT  L Hip ABduction: 4+/5  L Hip Internal Rotation: 4/5  L Hip External Rotation: 4+/5  L Knee Flexion: 4+/5  L Knee Extension: 5/5          [x] yes  [x] no   Long term goal 2: Improve LE and lumbar ROM WFL to allow increased ease with bending and lifting     AROM RLE (degrees)  RLE General AROM: SLR 68 deg     AROM LLE (degrees)  LLE General AROM: SLR 82 deg           Spine  Lumbar: ext, NT, flexion-WNL, SB-superior patella, b/l   [x] yes  [] no   Long term goal 3: Pt will demonstrate proper body mechanics for lifting and gardening  Improved body mechanics  [x] yes  [x] no   Long term goal 4: Oswestry </= 4 to demonstrate improved tolerance to functional activities  Oswestry= 18/50 [] yes  [x] no       Body structures, Functions, Activity limitations: Decreased functional mobility , Decreased ROM, Decreased strength, Decreased coordination    Assessment: Pt has made good progress in LE strength and demos challenge w/ core exs;  IDN was recently initiated w/ pt reporting some improvement. Pt may benefit from cont skilled PT to progress core and hip strengthening and stability and continuing w/ Dry needling to dec mm tension and balance MS mechanics. Prognosis: Good      G-Codes       PLAN: [x]   Frequency/Duration:  Plan  Times per week: 2  Plan weeks: 5 ( + 6 visits added this date w/ PN if needed)  Current Treatment Recommendations: Strengthening, ROM, Manual Therapy - Soft Tissue Mobilization, Manual Therapy - Joint Manipulation, Home Exercise Program, Safety Education & Training, Modalities, Integrated Dry Needling  Plan Comment: Transfer care of pt to Taco Schmidt PT                   ? Patient Status:[x] Continue/ Initiate plan of Care    [] Discharge PT. Recommend pt continue with HEP. [] Additional visits requested, Please re-certify for additional visits:          Signature:objective info by Electronically signed by Lakshmi Castillo PTA on 3/26/18 at 10:23 AM    Electronically signed by Taco Schmidt PT on 3/26/2018 at 12:44 PM    If you have any questions or concerns, please don't hesitate to call. Thank you for your referral.    I have reviewed this plan of care and certify a need for medically necessary rehabilitation services.     Physician Signature:__________________________________________________________  Date:  Please sign and return

## 2018-03-28 ENCOUNTER — HOSPITAL ENCOUNTER (OUTPATIENT)
Dept: PHYSICAL THERAPY | Age: 63
Setting detail: THERAPIES SERIES
Discharge: HOME | End: 2018-03-28
Payer: COMMERCIAL

## 2018-03-28 PROCEDURE — 97140 MANUAL THERAPY 1/> REGIONS: CPT

## 2018-03-28 PROCEDURE — 97035 APP MDLTY 1+ULTRASOUND EA 15: CPT

## 2018-03-28 PROCEDURE — 97110 THERAPEUTIC EXERCISES: CPT

## 2018-03-28 ASSESSMENT — PAIN DESCRIPTION - LOCATION: LOCATION: LEG

## 2018-03-28 ASSESSMENT — PAIN SCALES - GENERAL: PAINLEVEL_OUTOF10: 4

## 2018-03-28 ASSESSMENT — PAIN DESCRIPTION - DESCRIPTORS: DESCRIPTORS: SHOOTING

## 2018-03-28 ASSESSMENT — PAIN DESCRIPTION - PAIN TYPE: TYPE: CHRONIC PAIN

## 2018-03-28 ASSESSMENT — PAIN DESCRIPTION - ORIENTATION: ORIENTATION: RIGHT;LEFT

## 2018-03-28 NOTE — PROGRESS NOTES
LE pain, decreased ROM, LE weakness  Prognosis: Good  Patient Education: HEP, POC     Goals:  Short term goals  Time Frame for Short term goals: 3 wks   Short term goal 1: Independent with HEP   Short term goal 2: Report 50% improvement with symptoms to improve ability to stand at school and prepare meals     Long term goals  Time Frame for Long term goals : 5 wks   Long term goal 1: Improve LE strength >/= 4+/5 to allow increased ease with prolonged standing   Long term goal 2: Improve LE and lumbar ROM WFL to allow increased ease with bending and lifting   Long term goal 3: Pt will demonstrate proper body mechanics for lifting and gardening   Long term goal 4: Oswestry </= 4 to demonstrate improved tolerance to functional activities   Progress toward goals:ongoing    POST-PAIN       Pain Rating (0-10 pain scale):  2 /10   Location and pain description same as pre-treatment unless indicated. Action: [] NA   [x] Perform HEP  [] Meds as prescribed  [] Modalities as prescribed   [] Call Physician     Frequency/Duration:  Plan  Times per week: 2  Plan weeks: 5 ( + 6 visits added this date w/ PN if needed)  Current Treatment Recommendations: Strengthening, ROM, Manual Therapy - Soft Tissue Mobilization, Manual Therapy - Joint Manipulation, Home Exercise Program, Safety Education & Training, Modalities, Integrated Dry Needling  Plan Comment: Transfer care of pt to Seth Cormier PT      Pt to continue current HEP. See objective section for any therapeutic exercise changes, additions or modifications this date.          PT Individual Minutes  Time In: 4115  Time Out: 9144  Minutes: 38  Timed Code Treatment Minutes: 38 Minutes  Procedure Minutes:0    Signature:  Electronically signed by Tolu Starkey PTA on 3/28/18 at 10:33 AM

## 2018-03-29 ENCOUNTER — OFFICE VISIT (OUTPATIENT)
Dept: FAMILY MEDICINE CLINIC | Age: 63
End: 2018-03-29
Payer: COMMERCIAL

## 2018-03-29 VITALS
DIASTOLIC BLOOD PRESSURE: 80 MMHG | BODY MASS INDEX: 31.36 KG/M2 | RESPIRATION RATE: 14 BRPM | TEMPERATURE: 97.8 F | HEIGHT: 71 IN | SYSTOLIC BLOOD PRESSURE: 126 MMHG | WEIGHT: 224 LBS | HEART RATE: 69 BPM

## 2018-03-29 DIAGNOSIS — G89.29 CHRONIC LEFT-SIDED LOW BACK PAIN WITH SCIATICA, SCIATICA LATERALITY UNSPECIFIED: ICD-10-CM

## 2018-03-29 DIAGNOSIS — M47.27 LUMBOSACRAL RADICULOPATHY DUE TO DEGENERATIVE JOINT DISEASE OF SPINE: Primary | ICD-10-CM

## 2018-03-29 DIAGNOSIS — M54.40 CHRONIC LEFT-SIDED LOW BACK PAIN WITH SCIATICA, SCIATICA LATERALITY UNSPECIFIED: ICD-10-CM

## 2018-03-29 DIAGNOSIS — Z23 NEED FOR TDAP VACCINATION: ICD-10-CM

## 2018-03-29 PROCEDURE — 3017F COLORECTAL CA SCREEN DOC REV: CPT | Performed by: FAMILY MEDICINE

## 2018-03-29 PROCEDURE — G8417 CALC BMI ABV UP PARAM F/U: HCPCS | Performed by: FAMILY MEDICINE

## 2018-03-29 PROCEDURE — G8484 FLU IMMUNIZE NO ADMIN: HCPCS | Performed by: FAMILY MEDICINE

## 2018-03-29 PROCEDURE — 99213 OFFICE O/P EST LOW 20 MIN: CPT | Performed by: FAMILY MEDICINE

## 2018-03-29 PROCEDURE — 90471 IMMUNIZATION ADMIN: CPT | Performed by: FAMILY MEDICINE

## 2018-03-29 PROCEDURE — 90715 TDAP VACCINE 7 YRS/> IM: CPT | Performed by: FAMILY MEDICINE

## 2018-03-29 PROCEDURE — 1036F TOBACCO NON-USER: CPT | Performed by: FAMILY MEDICINE

## 2018-03-29 PROCEDURE — G8427 DOCREV CUR MEDS BY ELIG CLIN: HCPCS | Performed by: FAMILY MEDICINE

## 2018-03-29 RX ORDER — GABAPENTIN 300 MG/1
CAPSULE ORAL
Qty: 60 CAPSULE | Refills: 2 | Status: SHIPPED | OUTPATIENT
Start: 2018-03-29 | End: 2018-07-09 | Stop reason: ALTCHOICE

## 2018-03-29 RX ORDER — PREDNISONE 20 MG/1
TABLET ORAL
Qty: 18 TABLET | Refills: 0 | Status: SHIPPED | OUTPATIENT
Start: 2018-03-29 | End: 2018-07-09 | Stop reason: ALTCHOICE

## 2018-04-02 ENCOUNTER — HOSPITAL ENCOUNTER (OUTPATIENT)
Dept: PHYSICAL THERAPY | Age: 63
Setting detail: THERAPIES SERIES
Discharge: HOME | End: 2018-04-02
Payer: COMMERCIAL

## 2018-04-02 PROCEDURE — 97140 MANUAL THERAPY 1/> REGIONS: CPT

## 2018-04-02 PROCEDURE — 97110 THERAPEUTIC EXERCISES: CPT

## 2018-04-02 ASSESSMENT — PAIN SCALES - GENERAL: PAINLEVEL_OUTOF10: 4

## 2018-04-02 ASSESSMENT — PAIN DESCRIPTION - PAIN TYPE: TYPE: CHRONIC PAIN

## 2018-04-02 ASSESSMENT — PAIN DESCRIPTION - ORIENTATION: ORIENTATION: POSTERIOR;LOWER;LEFT

## 2018-04-02 ASSESSMENT — PAIN DESCRIPTION - DESCRIPTORS: DESCRIPTORS: SORE

## 2018-04-02 ASSESSMENT — PAIN DESCRIPTION - LOCATION: LOCATION: LEG

## 2018-04-04 ENCOUNTER — HOSPITAL ENCOUNTER (OUTPATIENT)
Dept: PHYSICAL THERAPY | Age: 63
Setting detail: THERAPIES SERIES
Discharge: HOME | End: 2018-04-04
Payer: COMMERCIAL

## 2018-04-04 PROCEDURE — 97140 MANUAL THERAPY 1/> REGIONS: CPT

## 2018-04-04 PROCEDURE — 97110 THERAPEUTIC EXERCISES: CPT

## 2018-04-04 ASSESSMENT — PAIN SCALES - GENERAL: PAINLEVEL_OUTOF10: 4

## 2018-04-04 ASSESSMENT — PAIN DESCRIPTION - ORIENTATION: ORIENTATION: RIGHT;LEFT;LOWER

## 2018-04-04 ASSESSMENT — PAIN DESCRIPTION - LOCATION: LOCATION: LEG

## 2018-04-04 ASSESSMENT — PAIN DESCRIPTION - PAIN TYPE: TYPE: CHRONIC PAIN

## 2018-04-05 ASSESSMENT — ENCOUNTER SYMPTOMS
ABDOMINAL PAIN: 0
SHORTNESS OF BREATH: 0
VOMITING: 0
BACK PAIN: 1
NAUSEA: 0
BLOOD IN STOOL: 0

## 2018-07-09 ENCOUNTER — OFFICE VISIT (OUTPATIENT)
Dept: FAMILY MEDICINE CLINIC | Age: 63
End: 2018-07-09
Payer: COMMERCIAL

## 2018-07-09 VITALS
OXYGEN SATURATION: 90 % | HEIGHT: 71 IN | TEMPERATURE: 97.6 F | SYSTOLIC BLOOD PRESSURE: 134 MMHG | RESPIRATION RATE: 18 BRPM | DIASTOLIC BLOOD PRESSURE: 70 MMHG | WEIGHT: 220 LBS | HEART RATE: 86 BPM | BODY MASS INDEX: 30.8 KG/M2

## 2018-07-09 DIAGNOSIS — G47.10 HYPERSOMNOLENCE DISORDER, PERSISTENT: Primary | ICD-10-CM

## 2018-07-09 DIAGNOSIS — R53.82 CHRONIC FATIGUE: ICD-10-CM

## 2018-07-09 DIAGNOSIS — R06.89 BREATH HOLDING EPISODES: ICD-10-CM

## 2018-07-09 PROCEDURE — 99213 OFFICE O/P EST LOW 20 MIN: CPT | Performed by: FAMILY MEDICINE

## 2018-07-09 PROCEDURE — G8417 CALC BMI ABV UP PARAM F/U: HCPCS | Performed by: FAMILY MEDICINE

## 2018-07-09 PROCEDURE — 3017F COLORECTAL CA SCREEN DOC REV: CPT | Performed by: FAMILY MEDICINE

## 2018-07-09 PROCEDURE — 1036F TOBACCO NON-USER: CPT | Performed by: FAMILY MEDICINE

## 2018-07-09 PROCEDURE — G8427 DOCREV CUR MEDS BY ELIG CLIN: HCPCS | Performed by: FAMILY MEDICINE

## 2018-07-09 RX ORDER — IBUPROFEN 200 MG
200 TABLET ORAL EVERY 6 HOURS PRN
COMMUNITY

## 2018-07-09 RX ORDER — TRAMADOL HYDROCHLORIDE 50 MG/1
50 TABLET ORAL EVERY 6 HOURS PRN
COMMUNITY

## 2018-07-09 RX ORDER — CETIRIZINE HYDROCHLORIDE 10 MG/1
10 TABLET ORAL DAILY
COMMUNITY

## 2018-07-09 NOTE — PROGRESS NOTES
Subjective  Alisia Lyman, 58 y.o. male presents today with:  Chief Complaint   Patient presents with    Fatigue     wakes up a lot during night, snores, daytime sleepiness, witnessed apneas       HPI  Patient presents today for sleep issues. The patient complains of daytime sleepiness, snoring, waking throughout the the night and states his wife has witnessed apneas. Much more fighting to stay awake at work, and during driving time  NO cp/palp/orta/edema  Rev/rxs; No abuse nor side effects on meds   ; No cardio-pulmonary probs;compliant with diet/rxs;no new gi-gu complaints   Review of Systems   Constitutional: Positive for fatigue. Negative for fever and unexpected weight change. Eyes: Negative for visual disturbance. Respiratory: Negative for cough. Cardiovascular: Negative for chest pain, palpitations and leg swelling. Gastrointestinal: Negative for abdominal pain, blood in stool and nausea. Genitourinary: Negative for flank pain. Musculoskeletal: Positive for myalgias. Negative for arthralgias (no gout x mos). Neurological: Positive for headaches. Negative for seizures, syncope, weakness, light-headedness and numbness. Psychiatric/Behavioral: Positive for decreased concentration. Allergies   Allergen Reactions    Adhesive Tape      hives for one week. :      Morphine     Seasonal     Yeast        Past Medical History:   Diagnosis Date    Gout     Onychomycosis of toenail     LEFT INDEX TOE     Seasonal allergic rhinitis     Uric acid nephrolithiasis      Past Surgical History:   Procedure Laterality Date    COLONOSCOPY  12/15/14        CYST REMOVAL  06/28/2018    DR. WALKER    CYSTOSCOPY  8/20/14    LITHOTRIPSY      X5    SINUS SURGERY  2001     Social History     Social History    Marital status:      Spouse name: N/A    Number of children: N/A    Years of education: N/A     Occupational History    Not on file.      Social History Main Topics    Smoking status: Never Smoker    Smokeless tobacco: Never Used    Alcohol use No    Drug use: Unknown    Sexual activity: Not on file     Other Topics Concern    Not on file     Social History Narrative    No narrative on file     Family History   Problem Relation Age of Onset    Heart Surgery Mother    Valeria Braun Father     Breast Cancer Sister           Current Outpatient Prescriptions on File Prior to Visit   Medication Sig Dispense Refill    aspirin 81 MG tablet Take 81 mg by mouth daily      hydrochlorothiazide (HYDRODIURIL) 25 MG tablet TAKE 1 TABLET DAILY 90 tablet 3    fluticasone (FLONASE) 50 MCG/ACT nasal spray 2 sprays by Nasal route daily 3 Bottle 3    allopurinol (ZYLOPRIM) 300 MG tablet TAKE 1 TABLET DAILY 90 tablet 3    Cholecalciferol (VITAMIN D-3 PO) Take by mouth      POTASSIUM CITRATE PO Take by mouth      JUBLIA 10 % SOLN Apply once daily to affected nails  3     No current facility-administered medications on file prior to visit. Objective    Vitals:    07/09/18 0925   BP: 134/70   Pulse: 86   Resp: 18   Temp: 97.6 °F (36.4 °C)   TempSrc: Temporal   SpO2: 90%   Weight: 220 lb (99.8 kg)   Height: 5' 11\" (1.803 m)     Physical Exam   Constitutional: He is oriented to person, place, and time and well-developed, well-nourished, and in no distress. No distress. Eyes: No scleral icterus. Neck: Normal range of motion. Neck supple. Carotid bruit is not present. No neck rigidity. No thyroid mass and no thyromegaly present. Cardiovascular: Normal rate, regular rhythm, S1 normal, S2 normal and normal heart sounds. No extrasystoles are present. No murmur heard. Pulses:       Dorsalis pedis pulses are 2+ on the right side, and 2+ on the left side. Posterior tibial pulses are 2+ on the right side, and 2+ on the left side. Pulmonary/Chest: Effort normal and breath sounds normal.   Abdominal: Normal appearance. He exhibits no abdominal bruit. There is no hepatosplenomegaly.

## 2018-07-16 ENCOUNTER — TELEPHONE (OUTPATIENT)
Dept: FAMILY MEDICINE CLINIC | Age: 63
End: 2018-07-16

## 2018-07-16 ASSESSMENT — ENCOUNTER SYMPTOMS
NAUSEA: 0
ABDOMINAL PAIN: 0
COUGH: 0
BLOOD IN STOOL: 0

## 2018-07-17 ENCOUNTER — TELEPHONE (OUTPATIENT)
Dept: FAMILY MEDICINE CLINIC | Age: 63
End: 2018-07-17

## 2018-08-13 ENCOUNTER — OFFICE VISIT (OUTPATIENT)
Dept: FAMILY MEDICINE CLINIC | Age: 63
End: 2018-08-13
Payer: COMMERCIAL

## 2018-08-13 VITALS
HEART RATE: 89 BPM | RESPIRATION RATE: 14 BRPM | SYSTOLIC BLOOD PRESSURE: 136 MMHG | BODY MASS INDEX: 30.52 KG/M2 | DIASTOLIC BLOOD PRESSURE: 78 MMHG | WEIGHT: 218 LBS | TEMPERATURE: 97.1 F | HEIGHT: 71 IN

## 2018-08-13 DIAGNOSIS — J30.2 SEASONAL ALLERGIC RHINITIS, UNSPECIFIED TRIGGER: ICD-10-CM

## 2018-08-13 DIAGNOSIS — G47.10 HYPERSOMNOLENCE DISORDER, PERSISTENT: ICD-10-CM

## 2018-08-13 DIAGNOSIS — N20.0 RENAL STONES: ICD-10-CM

## 2018-08-13 DIAGNOSIS — Z23 NEED FOR PROPHYLACTIC VACCINATION AND INOCULATION AGAINST VARICELLA: ICD-10-CM

## 2018-08-13 DIAGNOSIS — Z00.00 ANNUAL PHYSICAL EXAM: Primary | ICD-10-CM

## 2018-08-13 DIAGNOSIS — R53.82 CHRONIC FATIGUE: ICD-10-CM

## 2018-08-13 DIAGNOSIS — M10.9 GOUT OF MULTIPLE SITES, UNSPECIFIED CAUSE, UNSPECIFIED CHRONICITY: ICD-10-CM

## 2018-08-13 DIAGNOSIS — Z12.5 PROSTATE CANCER SCREENING: ICD-10-CM

## 2018-08-13 DIAGNOSIS — G47.33 OSA (OBSTRUCTIVE SLEEP APNEA): ICD-10-CM

## 2018-08-13 DIAGNOSIS — M54.17 LUMBOSACRAL RADICULOPATHY AT S1: ICD-10-CM

## 2018-08-13 PROCEDURE — 99396 PREV VISIT EST AGE 40-64: CPT | Performed by: FAMILY MEDICINE

## 2018-08-13 RX ORDER — ALLOPURINOL 300 MG/1
TABLET ORAL
Qty: 90 TABLET | Refills: 3 | Status: SHIPPED | OUTPATIENT
Start: 2018-08-13

## 2018-08-13 RX ORDER — HYDROCHLOROTHIAZIDE 25 MG/1
TABLET ORAL
Qty: 90 TABLET | Refills: 3 | Status: SHIPPED | OUTPATIENT
Start: 2018-08-13

## 2018-08-13 RX ORDER — FLUTICASONE PROPIONATE 50 MCG
2 SPRAY, SUSPENSION (ML) NASAL DAILY
Qty: 3 BOTTLE | Refills: 3 | Status: SHIPPED | OUTPATIENT
Start: 2018-08-13

## 2018-08-13 ASSESSMENT — PATIENT HEALTH QUESTIONNAIRE - PHQ9
2. FEELING DOWN, DEPRESSED OR HOPELESS: 0
SUM OF ALL RESPONSES TO PHQ QUESTIONS 1-9: 0
SUM OF ALL RESPONSES TO PHQ QUESTIONS 1-9: 0
1. LITTLE INTEREST OR PLEASURE IN DOING THINGS: 0
SUM OF ALL RESPONSES TO PHQ9 QUESTIONS 1 & 2: 0

## 2018-08-13 NOTE — PROGRESS NOTES
 Smokeless tobacco: Never Used    Alcohol use No    Drug use: Unknown    Sexual activity: Not on file     Other Topics Concern    Not on file     Social History Narrative    No narrative on file     Family History   Problem Relation Age of Onset    Heart Surgery Mother    Mich Cai Father     Breast Cancer Sister           Current Outpatient Prescriptions on File Prior to Visit   Medication Sig Dispense Refill    cetirizine (ZYRTEC) 10 MG tablet Take 10 mg by mouth daily      Omega-3 Fatty Acids (OMEGA-3 FISH OIL PO) Take by mouth      ibuprofen (ADVIL;MOTRIN) 200 MG tablet Take 200 mg by mouth every 6 hours as needed for Pain      aspirin 81 MG tablet Take 81 mg by mouth daily      Cholecalciferol (VITAMIN D-3 PO) Take by mouth      POTASSIUM CITRATE PO Take by mouth      traMADol (ULTRAM) 50 MG tablet Take 50 mg by mouth every 6 hours as needed for Pain. Lorren Lestraci JUBLIA 10 % SOLN Apply once daily to affected nails  3     No current facility-administered medications on file prior to visit. Objective    Vitals:    08/13/18 1330   BP: 136/78   Site: Right Arm   Position: Sitting   Cuff Size: Large Adult   Pulse: 89   Resp: 14   Temp: 97.1 °F (36.2 °C)   TempSrc: Temporal   Weight: 218 lb (98.9 kg)   Height: 5' 11\" (1.803 m)     Physical Exam   Constitutional: He is oriented to person, place, and time and well-developed, well-nourished, and in no distress. No distress. HENT:   Right Ear: Ear canal normal. Tympanic membrane is retracted. Tympanic membrane is not injected. Left Ear: Ear canal normal. Tympanic membrane is retracted. Tympanic membrane is not injected. Nose: Mucosal edema, rhinorrhea and septal deviation present. Right sinus exhibits no maxillary sinus tenderness. Left sinus exhibits no maxillary sinus tenderness. Mouth/Throat: Oropharynx is clear and moist.   Eyes: No scleral icterus. Neck: Normal range of motion. Neck supple. Carotid bruit is not present.  No neck 8.1 g/dL Final    Alb 08/10/2017 4.2  3.9 - 4.9 g/dL Final    Total Bilirubin 08/10/2017 1.4* 0.0 - 1.2 mg/dL Final    Alkaline Phosphatase 08/10/2017 75  35 - 104 U/L Final    ALT 08/10/2017 15  0 - 41 U/L Final    AST 08/10/2017 18  0 - 40 U/L Final    Globulin 08/10/2017 2.9  2.3 - 3.5 g/dL Final    ;  Assessment & Plan    Diagnosis Orders   1. Annual physical exam  Comprehensive Metabolic Panel    LDL Cholesterol, Direct   2. Gout of multiple sites, unspecified cause, unspecified chronicity,stable  allopurinol (ZYLOPRIM) 300 MG tablet   3. Renal stones,stable  hydrochlorothiazide (HYDRODIURIL) 25 MG tablet   4. Need for prophylactic vaccination and inoculation against varicella  zoster recombinant adjuvanted vaccine (SHINGRIX) 50 MCG SUSR injection   5. Prostate cancer screening  Psa screening   6. Seasonal allergic rhinitis, unspecified trigger  fluticasone (FLONASE) 50 MCG/ACT nasal spray   7. GAYLA (obstructive sleep apnea)  Sleep Study with PAP Titration   8. Hypersomnolence disorder, persistent  Baseline Diagnostic Sleep Study    Sleep Study with PAP Titration   9. Chronic fatigue  Baseline Diagnostic Sleep Study    Sleep Study with PAP Titration   10. Lumbosacral radiculopathy at S1,improved     needs psg/titration study;disc pos sleep study. ... Freddy Cloud  ; The patient is asked to make an attempt to improve diet and exercise patterns to aid in medical management of this problem.    ;Continue same meds, as common side effects and use reviewed-call if ineffective or problems   ;;See above orders, as use and side effects reviewed   Outpatient Encounter Prescriptions as of 8/13/2018   Medication Sig Dispense Refill    Multiple Vitamins-Minerals (MULTIVITAMIN ADULT PO) Take by mouth      allopurinol (ZYLOPRIM) 300 MG tablet TAKE 1 TABLET DAILY 90 tablet 3    fluticasone (FLONASE) 50 MCG/ACT nasal spray 2 sprays by Nasal route daily 3 Bottle 3    hydrochlorothiazide (HYDRODIURIL) 25 MG tablet TAKE 1 TABLET

## 2018-08-21 ASSESSMENT — ENCOUNTER SYMPTOMS
SORE THROAT: 0
BACK PAIN: 1
NAUSEA: 0
SINUS PRESSURE: 1
WHEEZING: 0
RHINORRHEA: 1
SINUS PAIN: 0
COUGH: 0
BLOOD IN STOOL: 0
SHORTNESS OF BREATH: 0
ABDOMINAL PAIN: 0

## 2018-10-06 DIAGNOSIS — Z12.5 PROSTATE CANCER SCREENING: ICD-10-CM

## 2018-10-06 DIAGNOSIS — Z00.00 ANNUAL PHYSICAL EXAM: ICD-10-CM

## 2018-10-06 LAB
ALBUMIN SERPL-MCNC: 4.2 G/DL (ref 3.9–4.9)
ALP BLD-CCNC: 69 U/L (ref 35–104)
ALT SERPL-CCNC: 23 U/L (ref 0–41)
ANION GAP SERPL CALCULATED.3IONS-SCNC: 11 MEQ/L (ref 7–13)
AST SERPL-CCNC: 20 U/L (ref 0–40)
BILIRUB SERPL-MCNC: 0.7 MG/DL (ref 0–1.2)
BUN BLDV-MCNC: 16 MG/DL (ref 8–23)
CALCIUM SERPL-MCNC: 9.1 MG/DL (ref 8.6–10.2)
CHLORIDE BLD-SCNC: 103 MEQ/L (ref 98–107)
CO2: 28 MEQ/L (ref 22–29)
CREAT SERPL-MCNC: 0.84 MG/DL (ref 0.7–1.2)
GFR AFRICAN AMERICAN: >60
GFR NON-AFRICAN AMERICAN: >60
GLOBULIN: 2.9 G/DL (ref 2.3–3.5)
GLUCOSE BLD-MCNC: 92 MG/DL (ref 74–109)
LDL CHOLESTEROL DIRECT: 142 MG/DL (ref 0–129)
POTASSIUM SERPL-SCNC: 4.7 MEQ/L (ref 3.5–5.1)
PROSTATE SPECIFIC ANTIGEN: 2.14 NG/ML (ref 0–5.4)
SODIUM BLD-SCNC: 142 MEQ/L (ref 132–144)
TOTAL PROTEIN: 7.1 G/DL (ref 6.4–8.1)

## 2019-01-22 ENCOUNTER — CLINICAL DOCUMENTATION (OUTPATIENT)
Dept: PHYSICAL THERAPY | Age: 64
End: 2019-01-22

## 2019-05-29 ENCOUNTER — TELEPHONE (OUTPATIENT)
Dept: ADMINISTRATIVE | Age: 64
End: 2019-05-29

## 2021-02-26 NOTE — PROGRESS NOTES
[address] [address]  Outpatient Physical Therapy    Treatment Note        Date: 3/5/2018  Patient: Nat Guzman  : 1955  ACCT #: [de-identified]  Referring Practitioner: Dr. Ritu Paulion   Diagnosis: lumbosacral radiculopathy due to DJD of spine, chronic left sided LBP with scicatica     Visit Information:  PT Visit Information  PT Insurance Information: MMO  Total # of Visits Approved:  (medical necessity, $25 copay )  Total # of Visits to Date: 2  No Show: 0  Canceled Appointment: 0  Progress Note Counter: 2/10     Subjective: pt reports compliance with HEP since last visit. Notes pain in left calf has decreased however increased low back pain noted right side>left      HEP Compliance:  [x] Good [] Fair [] Poor [] Reports not doing due to:    Vital Signs  Patient Currently in Pain: Yes   Pain Screening  Patient Currently in Pain: Yes  Pain Assessment  Pain Assessment: 0-10  Pain Level: 3  Pain Type: Acute pain  Pain Location: Back  Pain Orientation: Lower    OBJECTIVE:   Exercises  Exercise 1: Pt performs TFL stretch in supine, supine pifiromis str; advised to avoid standing hams stretch reaching to floor   Exercise 2: LTR 10s/10   Exercise 3: pelvic tilts 5s/15   Exercise 4:  piriformis str knee to opposite shld 3x30\"   Exercise 5: supine hamstring stretch 3x30   Exercise 6: 3 way SLR x10 ea   Exercise 7: SKTC, DKTC  20\"x5   Exercise 8: modified tien stretch 3x30\"   Exercise 20: HEP: pelvic tilts, LTR, seated piriformis str     Assessment: Body structures, Functions, Activity limitations: Decreased functional mobility , Decreased ROM, Decreased strength, Decreased coordination  Assessment: initiated exercises to improve hip and lumbar mobility, pt with good tolerance.    Treatment Diagnosis: LBP, Lt LE pain, decreased ROM, LE weakness  Prognosis: Good  Patient Education: HEP, POC     Goals:  Short term goals  Time Frame for Short term goals: 3 wks   Short term goal 1: Independent with HEP   Short term goal 2: Report 50% improvement with symptoms to improve ability to stand at school and prepare meals     Long term goals  Time Frame for Long term goals : 5 wks   Long term goal 1: Improve LE strength >/= 4+/5 to allow increased ease with prolonged standing   Long term goal 2: Improve LE and lumbar ROM WFL to allow increased ease with bending and lifting   Long term goal 3: Pt will demonstrate proper body mechanics for lifting and gardening   Long term goal 4: Oswestry </= 4 to demonstrate improved tolerance to functional activities   Progress toward goals:    POST-PAIN       Pain Rating (0-10 pain scale):  2 /10   Location and pain description same as pre-treatment unless indicated. Action: [] NA   [x] Perform HEP  [] Meds as prescribed  [] Modalities as prescribed   [] Call Physician     Frequency/Duration:  Plan  Times per week: 2  Plan weeks: 5  Current Treatment Recommendations: Strengthening, ROM, Manual Therapy - Soft Tissue Mobilization, Manual Therapy - Joint Manipulation, Home Exercise Program, Safety Education & Training, Modalities, Integrated Dry Needling  Plan Comment: Transfer care of pt to Darling Olivas, PT      Pt to continue current HEP. See objective section for any therapeutic exercise changes, additions or modifications this date.          PT Individual Minutes  Time In: 7895  Time Out: 7402  Minutes: 41  Timed Code Treatment Minutes: 41 Minutes  Procedure Minutes:    Signature:  Electronically signed by Miley Rodrigues PTA on 3/5/18 at 4:24 PM PAIN

## 2022-03-29 ENCOUNTER — HOSPITAL ENCOUNTER (OUTPATIENT)
Dept: PREADMISSION TESTING | Age: 67
Discharge: HOME | End: 2022-04-02
Payer: MEDICARE

## 2022-03-29 VITALS
HEART RATE: 74 BPM | WEIGHT: 239.6 LBS | OXYGEN SATURATION: 98 % | DIASTOLIC BLOOD PRESSURE: 80 MMHG | HEIGHT: 70 IN | BODY MASS INDEX: 34.3 KG/M2 | SYSTOLIC BLOOD PRESSURE: 160 MMHG | RESPIRATION RATE: 16 BRPM | TEMPERATURE: 97.2 F

## 2022-03-29 PROBLEM — J32.9 CHRONIC SINUSITIS: Status: ACTIVE | Noted: 2022-03-29

## 2022-03-29 LAB
ABO/RH: NORMAL
ANION GAP SERPL CALCULATED.3IONS-SCNC: 14 MEQ/L (ref 9–15)
ANTIBODY SCREEN: NORMAL
BUN BLDV-MCNC: 17 MG/DL (ref 8–23)
CALCIUM SERPL-MCNC: 9.5 MG/DL (ref 8.5–9.9)
CHLORIDE BLD-SCNC: 102 MEQ/L (ref 95–107)
CO2: 23 MEQ/L (ref 20–31)
CREAT SERPL-MCNC: 0.91 MG/DL (ref 0.7–1.2)
GFR AFRICAN AMERICAN: >60
GFR NON-AFRICAN AMERICAN: >60
GLUCOSE BLD-MCNC: 90 MG/DL (ref 70–99)
HCT VFR BLD CALC: 45.2 % (ref 42–52)
HEMOGLOBIN: 15 G/DL (ref 14–18)
MCH RBC QN AUTO: 29.7 PG (ref 27–31.3)
MCHC RBC AUTO-ENTMCNC: 33.2 % (ref 33–37)
MCV RBC AUTO: 89.4 FL (ref 80–100)
PDW BLD-RTO: 13.8 % (ref 11.5–14.5)
PLATELET # BLD: 191 K/UL (ref 130–400)
POTASSIUM SERPL-SCNC: 4.3 MEQ/L (ref 3.4–4.9)
RBC # BLD: 5.06 M/UL (ref 4.7–6.1)
SODIUM BLD-SCNC: 139 MEQ/L (ref 135–144)
WBC # BLD: 4.9 K/UL (ref 4.8–10.8)

## 2022-03-29 PROCEDURE — 85027 COMPLETE CBC AUTOMATED: CPT

## 2022-03-29 PROCEDURE — 86901 BLOOD TYPING SEROLOGIC RH(D): CPT

## 2022-03-29 PROCEDURE — 86900 BLOOD TYPING SEROLOGIC ABO: CPT

## 2022-03-29 PROCEDURE — 93005 ELECTROCARDIOGRAM TRACING: CPT | Performed by: NURSE PRACTITIONER

## 2022-03-29 PROCEDURE — 86850 RBC ANTIBODY SCREEN: CPT

## 2022-03-29 PROCEDURE — 80048 BASIC METABOLIC PNL TOTAL CA: CPT

## 2022-03-29 RX ORDER — PREDNISONE 20 MG/1
20 TABLET ORAL DAILY
COMMUNITY

## 2022-03-29 RX ORDER — SODIUM CHLORIDE, SODIUM LACTATE, POTASSIUM CHLORIDE, CALCIUM CHLORIDE 600; 310; 30; 20 MG/100ML; MG/100ML; MG/100ML; MG/100ML
INJECTION, SOLUTION INTRAVENOUS CONTINUOUS
Status: CANCELLED | OUTPATIENT
Start: 2022-04-05

## 2022-03-29 RX ORDER — TIZANIDINE 4 MG/1
6 TABLET ORAL NIGHTLY
COMMUNITY

## 2022-03-29 RX ORDER — SODIUM CHLORIDE 0.9 % (FLUSH) 0.9 %
10 SYRINGE (ML) INJECTION EVERY 12 HOURS SCHEDULED
Status: CANCELLED | OUTPATIENT
Start: 2022-04-05

## 2022-03-29 RX ORDER — LIDOCAINE HYDROCHLORIDE 10 MG/ML
1 INJECTION, SOLUTION EPIDURAL; INFILTRATION; INTRACAUDAL; PERINEURAL
Status: CANCELLED | OUTPATIENT
Start: 2022-04-05 | End: 2022-04-05

## 2022-03-29 RX ORDER — SODIUM CHLORIDE 0.9 % (FLUSH) 0.9 %
10 SYRINGE (ML) INJECTION PRN
Status: CANCELLED | OUTPATIENT
Start: 2022-04-05

## 2022-03-29 RX ORDER — LANOLIN ALCOHOL/MO/W.PET/CERES
10 CREAM (GRAM) TOPICAL NIGHTLY
COMMUNITY

## 2022-03-29 RX ORDER — SODIUM CHLORIDE 9 MG/ML
25 INJECTION, SOLUTION INTRAVENOUS PRN
Status: CANCELLED | OUTPATIENT
Start: 2022-04-05

## 2022-03-29 RX ORDER — ZINC GLUCONATE 50 MG
TABLET ORAL DAILY
COMMUNITY

## 2022-03-29 ASSESSMENT — ENCOUNTER SYMPTOMS
SHORTNESS OF BREATH: 0
CONSTIPATION: 0
EYES NEGATIVE: 1
VOMITING: 0
NAUSEA: 0
ALLERGIC/IMMUNOLOGIC NEGATIVE: 1
COUGH: 0
BACK PAIN: 1
ABDOMINAL PAIN: 0
WHEEZING: 0
DIARRHEA: 0
CHEST TIGHTNESS: 0
STRIDOR: 0

## 2022-03-29 NOTE — H&P
Nurse Practitioner History and Physical      CHIEF COMPLAINT:  Sinus surgery    HISTORY OF PRESENT ILLNESS:      The patient is a 75 y.o. male with significant past medical history of chronic sinusitis & polyps who presents for endoscopic ethmoidectomies, maxillary antrostomies, sphenoidotomy, nasal polypectomy, frontal sinusotomy. Past Medical History:        Diagnosis Date    Cancer ([facility] [address]     squamous cell skin left upper chest    Gout     Onychomycosis of toenail     LEFT INDEX TOE     Seasonal allergic rhinitis     Uric acid nephrolithiasis      Past Surgical History:    Past Surgical History:   Procedure Laterality Date    BACK SURGERY      ablations & epidurals    COLONOSCOPY  12/15/14        CYST INCISION AND DRAINAGE  2000s    rectal    CYST REMOVAL  06/28/2018    DR. WALKER  -- right thumb    CYSTOSCOPY  8/20/14    EYE SURGERY      Phaco with IOL OU    LITHOTRIPSY      x 5 with last 2020 [facility]    SINUS SURGERY  2001    Dr. Earl Landry      childhood         Medications Prior to Admission:    Current Outpatient Medications   Medication Sig Dispense Refill    tiZANidine (ZANAFLEX) 4 MG tablet Take 6 mg by mouth at bedtime      Tadalafil (CIALIS PO) Take by mouth as needed      GLUCOSAMINE-CHONDROITIN PO Take by mouth daily      CPAP Machine MISC by Does not apply route      zinc 50 MG TABS tablet Take by mouth daily      melatonin 3 MG TABS tablet Take 10 mg by mouth at bedtime      Multiple Vitamins-Minerals (MULTIVITAMIN ADULT PO) Take by mouth daily       allopurinol (ZYLOPRIM) 300 MG tablet TAKE 1 TABLET DAILY (Patient taking differently: Take 150 mg by mouth daily TAKE 1 TABLET DAILY) 90 tablet 3    fluticasone (FLONASE) 50 MCG/ACT nasal spray 2 sprays by Nasal route daily 3 Bottle 3    hydrochlorothiazide (HYDRODIURIL) 25 MG tablet TAKE 1 TABLET DAILY 90 tablet 3    cetirizine (ZYRTEC) 10 MG tablet Take 10 mg by mouth daily      Omega-3 Fatty Acids (OMEGA-3 FISH OIL PO) Take by mouth daily       traMADol (ULTRAM) 50 MG tablet Take 50 mg by mouth every 6 hours as needed for Pain. Elmon Plunk ibuprofen (ADVIL;MOTRIN) 200 MG tablet Take 200 mg by mouth every 6 hours as needed for Pain      JUBLIA 10 % SOLN Apply once daily to affected nails (Patient not taking: Reported on 3/29/2022)  3    aspirin 81 MG tablet Take 81 mg by mouth daily      Cholecalciferol (VITAMIN D-3 PO) Take by mouth daily       POTASSIUM CITRATE PO Take 99 mg by mouth daily        No current facility-administered medications for this encounter. Allergies:  Adhesive tape, Morphine, Seasonal, and Yeast    Social History:   Social History     Socioeconomic History    Marital status:      Spouse name: Not on file    Number of children: Not on file    Years of education: Not on file    Highest education level: Not on file   Occupational History    Not on file   Tobacco Use    Smoking status: Never Smoker    Smokeless tobacco: Never Used   Vaping Use    Vaping Use: Never used   Substance and Sexual Activity    Alcohol use: No    Drug use: Never    Sexual activity: Not on file   Other Topics Concern    Not on file   Social History Narrative    Not on file     Social Determinants of Health     Financial Resource Strain:     Difficulty of Paying Living Expenses: Not on file   Food Insecurity:     Worried About Running Out of Food in the Last Year: Not on file    Chris of Food in the Last Year: Not on file   Transportation Needs:     Lack of Transportation (Medical): Not on file    Lack of Transportation (Non-Medical):  Not on file   Physical Activity:     Days of Exercise per Week: Not on file    Minutes of Exercise per Session: Not on file   Stress:     Feeling of Stress : Not on file   Social Connections:     Frequency of Communication with Friends and Family: Not on file    Frequency of Social Gatherings with Friends and Family: Not on file    Attends [employer] Services: Not on file    Active Member of Clubs or Organizations: Not on file    Attends Club or Organization Meetings: Not on file    Marital Status: Not on file   Intimate Partner Violence:     Fear of Current or Ex-Partner: Not on file    Emotionally Abused: Not on file    Physically Abused: Not on file    Sexually Abused: Not on file   Housing Stability:     Unable to Pay for Housing in the Last Year: Not on file    Number of [place] in the Last Year: Not on file    Unstable Housing in the Last Year: Not on file       Family History:       Problem Relation Age of Onset    Lung Cancer Father     Breast Cancer Sister     Heart Surgery Mother     Coronary Art Dis Mother         5 vessel bypass    Coronary Art Dis Brother         cardiac stents    No Known Problems Son     No Known Problems Daughter        Review of Systems   Constitutional: Negative. Negative for chills and fever. HENT:        Deferred to Dr. German Guidry. Eyes: Negative. Negative for visual disturbance. Respiratory: Negative for cough, chest tightness, shortness of breath, wheezing and stridor. Cardiovascular: Negative for chest pain and palpitations. Gastrointestinal: Negative for abdominal pain, constipation, diarrhea, nausea and vomiting. Endocrine: Negative. Genitourinary: Negative for dysuria and frequency. Hx kidney stone   Musculoskeletal: Positive for back pain (thoracic & lumbar pain). Negative for myalgias and neck pain. Skin: Negative. Allergic/Immunologic: Negative. Neurological: Negative. Negative for seizures and headaches. Hematological: Negative. Psychiatric/Behavioral: Negative. Vitals: There were no vitals taken for this visit. Physical Exam  Vitals reviewed. HENT:      Head:      Comments: Deferred to Dr. German Guidry. Ears:      Comments: Deferred to Ran Sarmiento. Nose:      Comments: Deferred to Dr. German Guidry. Mouth/Throat:      Comments: Deferred to Dr. German Guidry.   Eyes: General: No scleral icterus. Extraocular Movements: Extraocular movements intact. Conjunctiva/sclera: Conjunctivae normal.      Pupils: Pupils are equal, round, and reactive to light. Comments: IOL OU. Neck:      Comments: Deferred to Dr. Brook Harman. Cardiovascular:      Rate and Rhythm: Normal rate and regular rhythm. Heart sounds: No murmur heard. No friction rub. No gallop. Pulmonary:      Effort: Pulmonary effort is normal.      Breath sounds: Normal breath sounds. No wheezing or rales. Abdominal:      General: Bowel sounds are normal.      Palpations: Abdomen is soft. There is no mass. Tenderness: There is no abdominal tenderness. Genitourinary:     Comments: Deferred. Musculoskeletal:         General: Normal range of motion. Right lower leg: No edema. Left lower leg: No edema. Skin:     General: Skin is warm and dry. Neurological:      Mental Status: He is alert and oriented to person, place, and time. Psychiatric:         Mood and Affect: Mood normal.         Behavior: Behavior normal.         Thought Content: Thought content normal.         Judgment: Judgment normal.         [unfilled]    Assessment:  Patient Active Problem List   Diagnosis    Uric acid nephrolithiasis    Seasonal allergic rhinitis    Gout    Congenital cystic disease of kidney    Rotator cuff (capsule) sprain    Arthralgia of shoulder    Chronic prostatitis    Other chronic dermatitis due to solar radiation    Calculus of kidney    GAYLA (obstructive sleep apnea)    Chronic sinusitis with polyps         Plan:  Scheduled for endoscopic ethmoidectomies, maxillary antrostomies, sphenoidotomy, nasal polypectomy, frontal sinusotomy.     GINGER Arteaga - CNP  3/29/2022  12:36 PM

## 2022-03-29 NOTE — PROGRESS NOTES
Prednisone instructions given to patient by Dr. Shireen Murray office -- to start 3/29/2022 x 7 days. Patient to bring disc for OR.

## 2022-03-30 LAB
EKG ATRIAL RATE: 69 BPM
EKG P AXIS: 30 DEGREES
EKG P-R INTERVAL: 156 MS
EKG Q-T INTERVAL: 378 MS
EKG QRS DURATION: 100 MS
EKG QTC CALCULATION (BAZETT): 405 MS
EKG R AXIS: 18 DEGREES
EKG T AXIS: 33 DEGREES
EKG VENTRICULAR RATE: 69 BPM

## 2022-03-30 PROCEDURE — 93010 ELECTROCARDIOGRAM REPORT: CPT | Performed by: INTERNAL MEDICINE

## 2022-04-05 ENCOUNTER — HOSPITAL ENCOUNTER (OUTPATIENT)
Age: 67
Setting detail: OUTPATIENT SURGERY
Discharge: HOME | End: 2022-04-05
Attending: OTOLARYNGOLOGY | Admitting: OTOLARYNGOLOGY
Payer: MEDICARE

## 2022-04-05 ENCOUNTER — ANESTHESIA (OUTPATIENT)
Dept: OPERATING ROOM | Age: 67
End: 2022-04-05
Payer: MEDICARE

## 2022-04-05 ENCOUNTER — ANESTHESIA EVENT (OUTPATIENT)
Dept: OPERATING ROOM | Age: 67
End: 2022-04-05
Payer: MEDICARE

## 2022-04-05 VITALS
SYSTOLIC BLOOD PRESSURE: 160 MMHG | BODY MASS INDEX: 34.22 KG/M2 | OXYGEN SATURATION: 95 % | DIASTOLIC BLOOD PRESSURE: 86 MMHG | WEIGHT: 239 LBS | HEIGHT: 70 IN | TEMPERATURE: 98.8 F | HEART RATE: 75 BPM | RESPIRATION RATE: 18 BRPM

## 2022-04-05 VITALS — OXYGEN SATURATION: 95 % | SYSTOLIC BLOOD PRESSURE: 151 MMHG | DIASTOLIC BLOOD PRESSURE: 95 MMHG

## 2022-04-05 DIAGNOSIS — J32.4 CHRONIC PANSINUSITIS: Primary | ICD-10-CM

## 2022-04-05 PROCEDURE — C2625 STENT, NON-COR, TEM W/DEL SY: HCPCS | Performed by: OTOLARYNGOLOGY

## 2022-04-05 PROCEDURE — 6370000000 HC RX 637 (ALT 250 FOR IP)

## 2022-04-05 PROCEDURE — 7100000011 HC PHASE II RECOVERY - ADDTL 15 MIN: Performed by: OTOLARYNGOLOGY

## 2022-04-05 PROCEDURE — 6370000000 HC RX 637 (ALT 250 FOR IP): Performed by: OTOLARYNGOLOGY

## 2022-04-05 PROCEDURE — 2500000003 HC RX 250 WO HCPCS: Performed by: ANESTHESIOLOGY

## 2022-04-05 PROCEDURE — 6360000002 HC RX W HCPCS: Performed by: OTOLARYNGOLOGY

## 2022-04-05 PROCEDURE — 2720000010 HC SURG SUPPLY STERILE: Performed by: OTOLARYNGOLOGY

## 2022-04-05 PROCEDURE — 3600000014 HC SURGERY LEVEL 4 ADDTL 15MIN: Performed by: OTOLARYNGOLOGY

## 2022-04-05 PROCEDURE — 6360000002 HC RX W HCPCS: Performed by: ANESTHESIOLOGY

## 2022-04-05 PROCEDURE — 7100000000 HC PACU RECOVERY - FIRST 15 MIN: Performed by: OTOLARYNGOLOGY

## 2022-04-05 PROCEDURE — 3700000000 HC ANESTHESIA ATTENDED CARE: Performed by: OTOLARYNGOLOGY

## 2022-04-05 PROCEDURE — 2580000003 HC RX 258

## 2022-04-05 PROCEDURE — 2709999900 HC NON-CHARGEABLE SUPPLY: Performed by: OTOLARYNGOLOGY

## 2022-04-05 PROCEDURE — A4217 STERILE WATER/SALINE, 500 ML: HCPCS | Performed by: OTOLARYNGOLOGY

## 2022-04-05 PROCEDURE — 3600000004 HC SURGERY LEVEL 4 BASE: Performed by: OTOLARYNGOLOGY

## 2022-04-05 PROCEDURE — 7100000001 HC PACU RECOVERY - ADDTL 15 MIN: Performed by: OTOLARYNGOLOGY

## 2022-04-05 PROCEDURE — 7100000010 HC PHASE II RECOVERY - FIRST 15 MIN: Performed by: OTOLARYNGOLOGY

## 2022-04-05 PROCEDURE — 6360000002 HC RX W HCPCS: Performed by: REGISTERED NURSE

## 2022-04-05 PROCEDURE — 2500000003 HC RX 250 WO HCPCS: Performed by: OTOLARYNGOLOGY

## 2022-04-05 PROCEDURE — 2500000003 HC RX 250 WO HCPCS: Performed by: REGISTERED NURSE

## 2022-04-05 PROCEDURE — 2580000003 HC RX 258: Performed by: OTOLARYNGOLOGY

## 2022-04-05 PROCEDURE — 88305 TISSUE EXAM BY PATHOLOGIST: CPT

## 2022-04-05 PROCEDURE — 3700000001 HC ADD 15 MINUTES (ANESTHESIA): Performed by: OTOLARYNGOLOGY

## 2022-04-05 DEVICE — PROPEL SINUS IMPLANT
Type: IMPLANTABLE DEVICE | Site: NOSE | Status: FUNCTIONAL
Brand: PROPEL

## 2022-04-05 RX ORDER — FENTANYL CITRATE 50 UG/ML
50 INJECTION, SOLUTION INTRAMUSCULAR; INTRAVENOUS EVERY 10 MIN PRN
Status: DISCONTINUED | OUTPATIENT
Start: 2022-04-05 | End: 2022-04-05 | Stop reason: HOSPADM

## 2022-04-05 RX ORDER — SODIUM CHLORIDE 0.9 % (FLUSH) 0.9 %
5-40 SYRINGE (ML) INJECTION PRN
Status: DISCONTINUED | OUTPATIENT
Start: 2022-04-05 | End: 2022-04-05 | Stop reason: HOSPADM

## 2022-04-05 RX ORDER — ESMOLOL HYDROCHLORIDE 10 MG/ML
INJECTION INTRAVENOUS PRN
Status: DISCONTINUED | OUTPATIENT
Start: 2022-04-05 | End: 2022-04-05 | Stop reason: SDUPTHER

## 2022-04-05 RX ORDER — MEPERIDINE HYDROCHLORIDE 25 MG/ML
12.5 INJECTION INTRAMUSCULAR; INTRAVENOUS; SUBCUTANEOUS EVERY 5 MIN PRN
Status: DISCONTINUED | OUTPATIENT
Start: 2022-04-05 | End: 2022-04-05 | Stop reason: HOSPADM

## 2022-04-05 RX ORDER — ONDANSETRON 2 MG/ML
INJECTION INTRAMUSCULAR; INTRAVENOUS PRN
Status: DISCONTINUED | OUTPATIENT
Start: 2022-04-05 | End: 2022-04-05 | Stop reason: SDUPTHER

## 2022-04-05 RX ORDER — LIDOCAINE HYDROCHLORIDE 10 MG/ML
1 INJECTION, SOLUTION EPIDURAL; INFILTRATION; INTRACAUDAL; PERINEURAL
Status: DISCONTINUED | OUTPATIENT
Start: 2022-04-05 | End: 2022-04-05 | Stop reason: HOSPADM

## 2022-04-05 RX ORDER — SODIUM CHLORIDE 0.9 % (FLUSH) 0.9 %
10 SYRINGE (ML) INJECTION EVERY 12 HOURS SCHEDULED
Status: DISCONTINUED | OUTPATIENT
Start: 2022-04-05 | End: 2022-04-05 | Stop reason: HOSPADM

## 2022-04-05 RX ORDER — SODIUM CHLORIDE 0.9 % (FLUSH) 0.9 %
10 SYRINGE (ML) INJECTION PRN
Status: DISCONTINUED | OUTPATIENT
Start: 2022-04-05 | End: 2022-04-05 | Stop reason: HOSPADM

## 2022-04-05 RX ORDER — MAGNESIUM HYDROXIDE 1200 MG/15ML
LIQUID ORAL CONTINUOUS PRN
Status: COMPLETED | OUTPATIENT
Start: 2022-04-05 | End: 2022-04-05

## 2022-04-05 RX ORDER — DIPHENHYDRAMINE HYDROCHLORIDE 50 MG/ML
12.5 INJECTION INTRAMUSCULAR; INTRAVENOUS
Status: DISCONTINUED | OUTPATIENT
Start: 2022-04-05 | End: 2022-04-05 | Stop reason: HOSPADM

## 2022-04-05 RX ORDER — PROPOFOL 10 MG/ML
INJECTION, EMULSION INTRAVENOUS PRN
Status: DISCONTINUED | OUTPATIENT
Start: 2022-04-05 | End: 2022-04-05 | Stop reason: SDUPTHER

## 2022-04-05 RX ORDER — ROCURONIUM BROMIDE 10 MG/ML
INJECTION, SOLUTION INTRAVENOUS PRN
Status: DISCONTINUED | OUTPATIENT
Start: 2022-04-05 | End: 2022-04-05 | Stop reason: SDUPTHER

## 2022-04-05 RX ORDER — SODIUM CHLORIDE, SODIUM LACTATE, POTASSIUM CHLORIDE, CALCIUM CHLORIDE 600; 310; 30; 20 MG/100ML; MG/100ML; MG/100ML; MG/100ML
INJECTION, SOLUTION INTRAVENOUS CONTINUOUS
Status: DISCONTINUED | OUTPATIENT
Start: 2022-04-05 | End: 2022-04-05 | Stop reason: HOSPADM

## 2022-04-05 RX ORDER — SODIUM CHLORIDE 0.9 % (FLUSH) 0.9 %
5-40 SYRINGE (ML) INJECTION EVERY 12 HOURS SCHEDULED
Status: DISCONTINUED | OUTPATIENT
Start: 2022-04-05 | End: 2022-04-05 | Stop reason: HOSPADM

## 2022-04-05 RX ORDER — CEFDINIR 300 MG/1
300 CAPSULE ORAL EVERY 12 HOURS SCHEDULED
Qty: 28 CAPSULE | Refills: 0 | Status: SHIPPED | OUTPATIENT
Start: 2022-04-05 | End: 2022-04-19

## 2022-04-05 RX ORDER — SODIUM CHLORIDE 9 MG/ML
25 INJECTION, SOLUTION INTRAVENOUS PRN
Status: DISCONTINUED | OUTPATIENT
Start: 2022-04-05 | End: 2022-04-05 | Stop reason: HOSPADM

## 2022-04-05 RX ORDER — FENTANYL CITRATE 50 UG/ML
INJECTION, SOLUTION INTRAMUSCULAR; INTRAVENOUS PRN
Status: DISCONTINUED | OUTPATIENT
Start: 2022-04-05 | End: 2022-04-05 | Stop reason: SDUPTHER

## 2022-04-05 RX ORDER — HYDROCODONE BITARTRATE AND ACETAMINOPHEN 5; 325 MG/1; MG/1
1 TABLET ORAL EVERY 4 HOURS PRN
Qty: 30 TABLET | Refills: 0 | Status: SHIPPED | OUTPATIENT
Start: 2022-04-05 | End: 2022-04-12

## 2022-04-05 RX ORDER — CEFDINIR 300 MG/1
300 CAPSULE ORAL EVERY 12 HOURS SCHEDULED
Status: DISCONTINUED | OUTPATIENT
Start: 2022-04-05 | End: 2022-04-05 | Stop reason: HOSPADM

## 2022-04-05 RX ORDER — OXYMETAZOLINE HYDROCHLORIDE 0.05 G/100ML
SPRAY NASAL PRN
Status: DISCONTINUED | OUTPATIENT
Start: 2022-04-05 | End: 2022-04-05 | Stop reason: ALTCHOICE

## 2022-04-05 RX ORDER — ONDANSETRON 2 MG/ML
4 INJECTION INTRAMUSCULAR; INTRAVENOUS
Status: DISCONTINUED | OUTPATIENT
Start: 2022-04-05 | End: 2022-04-05 | Stop reason: HOSPADM

## 2022-04-05 RX ORDER — DEXAMETHASONE SODIUM PHOSPHATE 10 MG/ML
INJECTION INTRAMUSCULAR; INTRAVENOUS PRN
Status: DISCONTINUED | OUTPATIENT
Start: 2022-04-05 | End: 2022-04-05 | Stop reason: SDUPTHER

## 2022-04-05 RX ORDER — LIDOCAINE HYDROCHLORIDE AND EPINEPHRINE 10; 10 MG/ML; UG/ML
INJECTION, SOLUTION INFILTRATION; PERINEURAL PRN
Status: DISCONTINUED | OUTPATIENT
Start: 2022-04-05 | End: 2022-04-05 | Stop reason: ALTCHOICE

## 2022-04-05 RX ORDER — SODIUM CHLORIDE, SODIUM LACTATE, POTASSIUM CHLORIDE, CALCIUM CHLORIDE 600; 310; 30; 20 MG/100ML; MG/100ML; MG/100ML; MG/100ML
INJECTION, SOLUTION INTRAVENOUS
Status: COMPLETED
Start: 2022-04-05 | End: 2022-04-05

## 2022-04-05 RX ORDER — METOCLOPRAMIDE HYDROCHLORIDE 5 MG/ML
10 INJECTION INTRAMUSCULAR; INTRAVENOUS
Status: DISCONTINUED | OUTPATIENT
Start: 2022-04-05 | End: 2022-04-05 | Stop reason: HOSPADM

## 2022-04-05 RX ORDER — LABETALOL 20 MG/4 ML (5 MG/ML) INTRAVENOUS SYRINGE
5 ONCE
Status: COMPLETED | OUTPATIENT
Start: 2022-04-05 | End: 2022-04-05

## 2022-04-05 RX ORDER — HYDROCODONE BITARTRATE AND ACETAMINOPHEN 5; 325 MG/1; MG/1
1 TABLET ORAL EVERY 4 HOURS PRN
Status: DISCONTINUED | OUTPATIENT
Start: 2022-04-05 | End: 2022-04-05 | Stop reason: HOSPADM

## 2022-04-05 RX ADMIN — PROPOFOL 200 MG: 10 INJECTION, EMULSION INTRAVENOUS at 09:25

## 2022-04-05 RX ADMIN — CEFAZOLIN SODIUM 2000 MG: 10 INJECTION, POWDER, FOR SOLUTION INTRAVENOUS at 09:25

## 2022-04-05 RX ADMIN — SODIUM CHLORIDE, SODIUM LACTATE, POTASSIUM CHLORIDE, CALCIUM CHLORIDE: 600; 310; 30; 20 INJECTION, SOLUTION INTRAVENOUS at 08:01

## 2022-04-05 RX ADMIN — ESMOLOL HYDROCHLORIDE 30 MG: 100 INJECTION, SOLUTION INTRAVENOUS at 10:08

## 2022-04-05 RX ADMIN — FENTANYL CITRATE 100 MCG: 50 INJECTION, SOLUTION INTRAMUSCULAR; INTRAVENOUS at 09:25

## 2022-04-05 RX ADMIN — ONDANSETRON 4 MG: 2 INJECTION INTRAMUSCULAR; INTRAVENOUS at 09:25

## 2022-04-05 RX ADMIN — SUGAMMADEX 200 MG: 100 INJECTION, SOLUTION INTRAVENOUS at 10:30

## 2022-04-05 RX ADMIN — SODIUM CHLORIDE, POTASSIUM CHLORIDE, SODIUM LACTATE AND CALCIUM CHLORIDE: 600; 310; 30; 20 INJECTION, SOLUTION INTRAVENOUS at 08:01

## 2022-04-05 RX ADMIN — FENTANYL CITRATE 50 MCG: 50 INJECTION INTRAMUSCULAR; INTRAVENOUS at 11:08

## 2022-04-05 RX ADMIN — CEFDINIR 300 MG: 300 CAPSULE ORAL at 12:11

## 2022-04-05 RX ADMIN — FENTANYL CITRATE 50 MCG: 50 INJECTION INTRAMUSCULAR; INTRAVENOUS at 10:49

## 2022-04-05 RX ADMIN — ROCURONIUM BROMIDE 50 MG: 10 INJECTION, SOLUTION INTRAVENOUS at 09:25

## 2022-04-05 RX ADMIN — DEXAMETHASONE SODIUM PHOSPHATE 10 MG: 10 INJECTION INTRAMUSCULAR; INTRAVENOUS at 09:25

## 2022-04-05 RX ADMIN — LABETALOL 20 MG/4 ML (5 MG/ML) INTRAVENOUS SYRINGE 5 MG: at 11:14

## 2022-04-05 ASSESSMENT — PAIN DESCRIPTION - LOCATION: LOCATION: NOSE

## 2022-04-05 ASSESSMENT — PULMONARY FUNCTION TESTS
PIF_VALUE: 15
PIF_VALUE: 2
PIF_VALUE: 4
PIF_VALUE: 19
PIF_VALUE: 15
PIF_VALUE: 19
PIF_VALUE: 20
PIF_VALUE: 15
PIF_VALUE: 20
PIF_VALUE: 19
PIF_VALUE: 19
PIF_VALUE: 15
PIF_VALUE: 20
PIF_VALUE: 5
PIF_VALUE: 19
PIF_VALUE: 28
PIF_VALUE: 28
PIF_VALUE: 20
PIF_VALUE: 19
PIF_VALUE: 20
PIF_VALUE: 20
PIF_VALUE: 19
PIF_VALUE: 0
PIF_VALUE: 20
PIF_VALUE: 19
PIF_VALUE: 15
PIF_VALUE: 0
PIF_VALUE: 0
PIF_VALUE: 20
PIF_VALUE: 19
PIF_VALUE: 19
PIF_VALUE: 20
PIF_VALUE: 29
PIF_VALUE: 1
PIF_VALUE: 2
PIF_VALUE: 20
PIF_VALUE: 15
PIF_VALUE: 15
PIF_VALUE: 20
PIF_VALUE: 15
PIF_VALUE: 15
PIF_VALUE: 19
PIF_VALUE: 0
PIF_VALUE: 20
PIF_VALUE: 30
PIF_VALUE: 20
PIF_VALUE: 19
PIF_VALUE: 2
PIF_VALUE: 19
PIF_VALUE: 20
PIF_VALUE: 15
PIF_VALUE: 15
PIF_VALUE: 20
PIF_VALUE: 15
PIF_VALUE: 1
PIF_VALUE: 15
PIF_VALUE: 19
PIF_VALUE: 20
PIF_VALUE: 1
PIF_VALUE: 15
PIF_VALUE: 19
PIF_VALUE: 29
PIF_VALUE: 20
PIF_VALUE: 20
PIF_VALUE: 2
PIF_VALUE: 15
PIF_VALUE: 19
PIF_VALUE: 18
PIF_VALUE: 19
PIF_VALUE: 20
PIF_VALUE: 20

## 2022-04-05 ASSESSMENT — PAIN - FUNCTIONAL ASSESSMENT: PAIN_FUNCTIONAL_ASSESSMENT: 0-10

## 2022-04-05 ASSESSMENT — PAIN SCALES - GENERAL
PAINLEVEL_OUTOF10: 7
PAINLEVEL_OUTOF10: 8
PAINLEVEL_OUTOF10: 4

## 2022-04-05 ASSESSMENT — PAIN DESCRIPTION - PAIN TYPE: TYPE: SURGICAL PAIN

## 2022-04-05 ASSESSMENT — PAIN DESCRIPTION - DESCRIPTORS: DESCRIPTORS: BURNING

## 2022-04-05 NOTE — PROGRESS NOTES
Patient received from [facility]. Snack and beverage given, tolerating well. Discharge instructions given by Alessandra Pineda to patient and family member.

## 2022-04-05 NOTE — ANESTHESIA POSTPROCEDURE EVALUATION
Department of Anesthesiology  Postprocedure Note    Patient: Sil Hernandez  MRN: [id number]  YOB: 1955  Date of evaluation: 4/5/2022  Time:  10:35 AM     Procedure Summary     Date: 04/05/22 Room / Location: [address]    Anesthesia Start: 3584 Anesthesia Stop: 7849    Procedure: ENDOSCOPIC ETHMOIDECTOMIES MAXILLARY ANTROSTOMIES SPHENOIDOTOMY NASAL POLYPECTOMY FRONTAL SINUSOTOMY (N/A ) Diagnosis: (CHRONIC SINUSITIS, POLYPS)    Surgeons: Angy Adams MD Responsible Provider: Oswaldo Cortes MD    Anesthesia Type: general ASA Status: 2          Anesthesia Type: general    Ethel Phase I:      Ethel Phase II:      Last vitals: Reviewed and per EMR flowsheets.        Anesthesia Post Evaluation    Patient location during evaluation: bedside  Patient participation: complete - patient participated  Level of consciousness: awake and awake and alert  Airway patency: patent  Nausea & Vomiting: no nausea and no vomiting  Complications: no  Cardiovascular status: blood pressure returned to baseline and hemodynamically stable  Respiratory status: acceptable  Hydration status: euvolemic

## 2022-04-05 NOTE — BRIEF OP NOTE
Brief Postoperative Note      Patient: Radha Wayne  YOB: 1955  MRN: [id number]    Date of Procedure: 4/5/2022    Pre-Op Diagnosis: CHRONIC SINUSITIS, POLYPS    Post-Op Diagnosis: Same       Procedure(s):  ENDOSCOPIC ETHMOIDECTOMIES MAXILLARY ANTROSTOMIES SPHENOIDOTOMY NASAL POLYPECTOMY FRONTAL SINUSOTOMY    Surgeon(s): Ruth Urbina MD    Assistant:  First Assistant: [address]    Anesthesia: General    Estimated Blood Loss (mL): Minimal    Complications: None    Specimens:   ID Type Source Tests Collected by Time Destination   A : BILATERAL NASAL CONTENTS Tissue Nose SURGICAL PATHOLOGY Ruth Urbina MD 4/5/2022 1702        Implants:  Implant Name Type Inv.  Item Serial No.  Lot No. LRB No. Used Action   IMPLANT NSL L23MM MOMETASONE FUROATE PROPEL - [id number]  IMPLANT NSL L23MM MOMETASONE FUROATE PROPEL  [employer] [id number] Left 1 Implanted   IMPLANT NSL L23MM MOMETASONE FUROATE PROPEL - [id number]  IMPLANT NSL L23MM MOMETASONE FUROATE PROPEL  [employer]WD [id number] Right 1 Implanted         Drains: * No LDAs found *    Findings:     Electronically signed by Ruth Urbina MD on 4/5/2022 at 10:42 AM

## 2022-04-05 NOTE — OP NOTE
Dominique Marte [address] [place] [address], [address] [facility]                                OPERATIVE REPORT    PATIENT NAME: Catrachito Childers                        :        1955  MED REC NO:   [id number]                            ROOM:  ACCOUNT NO:   [de-identified]                           ADMIT DATE: 2022  PROVIDER:     Zeke Head MD    DATE OF PROCEDURE:  2022    DIAGNOSES:  Chronic pansinusitis with sinonasal polyposis. OPERATIONS PERFORMED:  1.  Bilateral transnasal endoscopic anterior and posterior  ethmoidectomy. 2.  Bilateral transnasal endoscopic maxillary antrostomy. 3.  Right transnasal endoscopic sphenoidotomy. 4.  Right transnasal endoscopic frontal sinusotomy. SURGEON:  Zeke Head MD    ANESTHESIA:  General.    INDICATIONS:  The patient is a 25-year-old male with significant history  of chronic sinusitis with sinonasal polyposis with recommendation for  definitive surgical intervention to address these issues with CT  confirmation of the findings and to be done in conjunction with [employer]  navigation IGS systems. OPERATIVE PROCEDURE:  The patient was taken to the operating room and  administered general anesthesia. Appropriate prep and drape and  time-out were performed with application of the [employer] IGS and face  mask. Confirmation of functionality of system was noted down to 0.5 mm. The patient had endoscopic visualization performed to identify the  middle turbinates bilaterally. These were infiltrated with lidocaine 1%  - epinephrine 1:100,000 with gentle medialization of the turbinates and  placement of Afrin pledgets base constriction of hemostatic purposes. After waiting several minutes, the right side was addressed. The right  middle turbinate was gently medialized.   The patient had a microdebrider  utilized with 4-mm blade to take down all grossly visualized polypoid  tissue working for an anterior to posterior and from an inferior to  superior direction. All polyps were taken down accordingly working  through the residual basal lamella and the anterior face of the sphenoid  sinus. Confirmation of presence within was confirmed with IGS. The  patient had the frontal recess opened inferiorly with taken down  polypoid tissue at this level with care identification utilizing both  IGS as well as the lighted-guided seeker. The right maxillary sinus was  then opened accordingly by taking down the residual uncinate process at  this level with creation of a prompt antrostomy into the right maxillary  sinus. Afrin pledget was placed and attention directed to left side. The patient had a similar operation performed opening up the ethmoid  cells working from an anterior to posterior and from an inferior to  superior direction taking down all grossly visualized polypoid tissue  with a microdebrider with significant improvement in the middle meatus. The left residual uncinate region was opened accordingly with creation  of a prominent antrostomy into the left maxillary sinus. The sinuses  were copiously irrigated. Afrin pledgets were placed and subsequently  removed with placement of Fibrillar as well as the drug-eluting propel  stents. This was placed bilaterally. Inferior Afrin pledgets were  placed and it will be removed in Recovery. The patient was allowed to  be released and taken to recovery in a stable condition. Estimated  blood loss minimal.  No complications. Isaiah Martin MD    D: 04/05/2022 10:54:13       T: 04/05/2022 10:56:46     [email or web address]  Job#: [id number]     Doc#: [id number]    CC:   Karen Leon MD

## 2022-04-05 NOTE — ANESTHESIA PRE PROCEDURE
Department of Anesthesiology  Preprocedure Note       Name:  Donovan Miller   Age:  75 y.o.  :  1955                                          MRN:  [id number]         Date:  2022      Surgeon: Bobby Sandoval): Zeke Head MD    Procedure: Procedure(s):  ENDOSCOPIC ETHMOIDECTOMIES MAXILLARY ANTROSTOMIES SPHENOIDOTOMY NASAL POLYPECTOMY FRONTAL SINUSOTOMY 1 1/2 HOURS IGS SHRUTHI NEEDED PT TO BRING IN One [address] Manny    Medications prior to admission:   Prior to Admission medications    Medication Sig Start Date End Date Taking? Authorizing Provider   zinc 50 MG TABS tablet Take by mouth daily    Historical Provider, MD   melatonin 3 MG TABS tablet Take 10 mg by mouth at bedtime    Historical Provider, MD   tiZANidine (ZANAFLEX) 4 MG tablet Take 6 mg by mouth at bedtime    Historical Provider, MD   Tadalafil (CIALIS PO) Take by mouth as needed    Historical Provider, MD   GLUCOSAMINE-CHONDROITIN PO Take by mouth daily    Historical Provider, MD   CPAP Machine MISC by Does not apply route    Historical Provider, MD   predniSONE (DELTASONE) 20 MG tablet Take 20 mg by mouth daily To start 3/29/2022:    2 tablets x 3 days  1 tablet x 2 days  1/2 tablet x 2 days. Historical Provider, MD   Multiple Vitamins-Minerals (MULTIVITAMIN ADULT PO) Take by mouth daily     Historical Provider, MD   allopurinol (ZYLOPRIM) 300 MG tablet TAKE 1 TABLET DAILY  Patient taking differently: Take 150 mg by mouth daily TAKE 1 TABLET DAILY 18   Ryan Martinez DO   fluticasone [facility]) 50 MCG/ACT nasal spray 2 sprays by Nasal route daily 18   Ryan Martinez DO   hydrochlorothiazide (HYDRODIURIL) 25 MG tablet TAKE 1 TABLET DAILY 18   Ryan Martinez DO   cetirizine (ZYRTEC) 10 MG tablet Take 10 mg by mouth daily    Historical Provider, MD   Omega-3 Fatty Acids (OMEGA-3 FISH OIL PO) Take by mouth daily     Historical Provider, MD   traMADol (ULTRAM) 50 MG tablet Take 50 mg by mouth every 6 hours as needed for Pain. Verónica Rodriguez     Historical Provider, MD ibuprofen (ADVIL;MOTRIN) 200 MG tablet Take 200 mg by mouth every 6 hours as needed for Pain    Historical Provider, MD LUNDBERG 10 % SOLN Apply once daily to affected nails  Patient not taking: Reported on 3/29/2022 2/3/18   Historical Provider, MD   aspirin 81 MG tablet Take 81 mg by mouth daily    Historical Provider, MD   Cholecalciferol (VITAMIN D-3 PO) Take by mouth daily     Historical Provider, MD   POTASSIUM CITRATE PO Take 99 mg by mouth daily     Historical Provider, MD       Current medications:    Current Facility-Administered Medications   Medication Dose Route Frequency Provider Last Rate Last Admin    sodium chloride flush 0.9 % injection 5-40 mL  5-40 mL IntraVENous 2 times per day Fransico Rubio MD        sodium chloride flush 0.9 % injection 5-40 mL  5-40 mL IntraVENous PRN Fransico Rubio MD        0.9 % sodium chloride infusion  25 mL IntraVENous PRN Fransico Rubio MD        meperidine (DEMEROL) injection 12.5 mg  12.5 mg IntraVENous Q5 Min PRN Fransico Rubio MD        fentaNYL (SUBLIMAZE) injection 50 mcg  50 mcg IntraVENous Q10 Min PRN Fransico Rubio MD        ondansetron [facility]) injection 4 mg  4 mg IntraVENous Once PRN Fransico Rubio MD        metoclopramide [facility]) injection 10 mg  10 mg IntraVENous Once PRN Fransico Rubio MD        diphenhydrAMINE (BENADRYL) injection 12.5 mg  12.5 mg IntraVENous Once PRN Fransico Rubio MD           Allergies:     Allergies   Allergen Reactions    Adhesive Tape      hives for one week. 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 WALKER  -- right thumb    CYSTOSCOPY  8/20/14    EYE SURGERY      Phaco with IOL OU    LITHOTRIPSY      x 5 with last 2020 [facility]   Benita Pollack  2001    Dr. Bill Thomas      childhood       Social History:    Social History     Tobacco Use    Smoking status: Never Smoker    Smokeless tobacco: Never Used   Substance Use Topics    Alcohol use: No                                Counseling given: Not Answered      Vital Signs (Current): There were no vitals filed for this visit.                                            BP Readings from Last 3 Encounters:   03/29/22 (!) 160/80   08/13/18 136/78   07/09/18 134/70       NPO Status:                                                                                 BMI:   Wt Readings from Last 3 Encounters:   03/29/22 239 lb 9.6 oz (108.7 kg)   08/13/18 218 lb (98.9 kg)   07/09/18 220 lb (99.8 kg)     There is no height or weight on file to calculate BMI.    CBC:   Lab Results   Component Value Date    WBC 4.9 03/29/2022    RBC 5.06 03/29/2022    HGB 15.0 03/29/2022    HCT 45.2 03/29/2022    MCV 89.4 03/29/2022    RDW 13.8 03/29/2022     03/29/2022       CMP:   Lab Results   Component Value Date     03/29/2022    K 4.3 03/29/2022     03/29/2022    CO2 23 03/29/2022    BUN 17 03/29/2022    CREATININE 0.91 03/29/2022    GFRAA >60.0 03/29/2022    LABGLOM >60.0 03/29/2022    GLUCOSE 90 03/29/2022    PROT 7.1 10/06/2018    CALCIUM 9.5 03/29/2022    BILITOT 0.7 10/06/2018    ALKPHOS 69 10/06/2018    AST 20 10/06/2018    ALT 23 10/06/2018       POC Tests: No results for input(s): POCGLU, POCNA, POCK, POCCL, POCBUN, POCHEMO, POCHCT in the last 72 hours. Coags: No results found for: PROTIME, INR, APTT    HCG (If Applicable): No results found for: PREGTESTUR, PREGSERUM, HCG, HCGQUANT     ABGs: No results found for: PHART, PO2ART, UST9EPS, QQV6EKI, BEART, V9GWVWAJ     Type & Screen (If Applicable):  No results found for: LABABO, LABRH    Drug/Infectious Status (If Applicable):  No results found for: HIV, HEPCAB    COVID-19 Screening (If Applicable): No results found for: COVID19        Anesthesia Evaluation  Patient summary reviewed and Nursing notes reviewed no history of anesthetic complications:   Airway: Mallampati: II  TM distance: >3 FB   Neck ROM: full  Mouth opening: > = 3 FB Dental: normal exam         Pulmonary:normal exam    (+) sleep apnea:                             Cardiovascular:Negative CV ROS                      Neuro/Psych:   Negative Neuro/Psych ROS              GI/Hepatic/Renal: Neg GI/Hepatic/Renal ROS            Endo/Other: Negative Endo/Other ROS                    Abdominal:   (+) obese,           Vascular: negative vascular ROS. Other Findings:             Anesthesia Plan      general     ASA 2       Induction: intravenous. MIPS: Prophylactic antiemetics administered. Anesthetic plan and risks discussed with patient.                       Loulou Cardona MD   4/5/2022

## 2022-10-13 ENCOUNTER — HOSPITAL ENCOUNTER (EMERGENCY)
Age: 67
Discharge: HOME | End: 2022-10-13
Attending: EMERGENCY MEDICINE
Payer: MEDICARE

## 2022-10-13 ENCOUNTER — APPOINTMENT (OUTPATIENT)
Dept: GENERAL RADIOLOGY | Age: 67
End: 2022-10-13
Payer: MEDICARE

## 2022-10-13 VITALS
TEMPERATURE: 97.2 F | HEIGHT: 71 IN | RESPIRATION RATE: 18 BRPM | HEART RATE: 82 BPM | SYSTOLIC BLOOD PRESSURE: 153 MMHG | WEIGHT: 230 LBS | DIASTOLIC BLOOD PRESSURE: 99 MMHG | OXYGEN SATURATION: 96 % | BODY MASS INDEX: 32.2 KG/M2

## 2022-10-13 DIAGNOSIS — R07.81 RIB PAIN ON LEFT SIDE: Primary | ICD-10-CM

## 2022-10-13 PROCEDURE — 6370000000 HC RX 637 (ALT 250 FOR IP): Performed by: EMERGENCY MEDICINE

## 2022-10-13 PROCEDURE — 99283 EMERGENCY DEPT VISIT LOW MDM: CPT

## 2022-10-13 PROCEDURE — 71101 X-RAY EXAM UNILAT RIBS/CHEST: CPT

## 2022-10-13 RX ORDER — NAPROXEN 500 MG/1
500 TABLET ORAL ONCE
Status: COMPLETED | OUTPATIENT
Start: 2022-10-13 | End: 2022-10-13

## 2022-10-13 RX ORDER — NAPROXEN 500 MG/1
500 TABLET ORAL 2 TIMES DAILY PRN
Qty: 10 TABLET | Refills: 0 | Status: SHIPPED | OUTPATIENT
Start: 2022-10-13

## 2022-10-13 RX ORDER — HYDROCODONE BITARTRATE AND ACETAMINOPHEN 5; 325 MG/1; MG/1
1 TABLET ORAL ONCE
Status: COMPLETED | OUTPATIENT
Start: 2022-10-13 | End: 2022-10-13

## 2022-10-13 RX ORDER — HYDROCODONE BITARTRATE AND ACETAMINOPHEN 5; 325 MG/1; MG/1
1 TABLET ORAL EVERY 6 HOURS PRN
Qty: 12 TABLET | Refills: 0 | Status: SHIPPED | OUTPATIENT
Start: 2022-10-13 | End: 2022-10-16

## 2022-10-13 RX ADMIN — NAPROXEN 500 MG: 500 TABLET ORAL at 11:46

## 2022-10-13 RX ADMIN — HYDROCODONE BITARTRATE AND ACETAMINOPHEN 1 TABLET: 5; 325 TABLET ORAL at 11:46

## 2022-10-13 ASSESSMENT — PAIN - FUNCTIONAL ASSESSMENT: PAIN_FUNCTIONAL_ASSESSMENT: PREVENTS OR INTERFERES WITH ALL ACTIVE AND SOME PASSIVE ACTIVITIES

## 2022-10-13 ASSESSMENT — PAIN SCALES - GENERAL
PAINLEVEL_OUTOF10: 6
PAINLEVEL_OUTOF10: 6

## 2022-10-13 ASSESSMENT — PAIN DESCRIPTION - ORIENTATION: ORIENTATION: LEFT

## 2022-10-13 ASSESSMENT — PAIN DESCRIPTION - FREQUENCY: FREQUENCY: CONTINUOUS

## 2022-10-13 ASSESSMENT — PAIN DESCRIPTION - ONSET: ONSET: SUDDEN

## 2022-10-13 ASSESSMENT — PAIN DESCRIPTION - PAIN TYPE: TYPE: ACUTE PAIN

## 2022-10-13 ASSESSMENT — PAIN DESCRIPTION - LOCATION: LOCATION: RIB CAGE

## 2022-10-13 ASSESSMENT — PAIN DESCRIPTION - DESCRIPTORS: DESCRIPTORS: STABBING;SHOOTING;ACHING

## 2022-10-13 NOTE — ED PROVIDER NOTES
[facility] ED  EMERGENCY DEPARTMENT ENCOUNTER      Pt Name: Makenzie Mccormick  MRN: [id number]  [place] 1955  Date of evaluation: 10/13/2022  Provider: Jamir Patterson, [address] [place] [address]       Chief Complaint   Patient presents with    Rib Pain (injury)     Left side rib pain since approx 0700      Complaint: Left rib pain    History of chief complaint: This 69-year-old gentleman presents the emergency department complaining of left-sided lower rib pain. Patient points over the anterior axillary line in the region of ribs 9/10. Patient states they are sleeping in a camper bed he went to roll over in it and felt a sudden pop mediate pain in the left rib. Patient denies any shortness of breath but states it hurts to take a deep breath or sneeze. Patient denies other injury or trauma no recent illness fevers chills cough cold vomiting or diarrhea no abdominal pain. Patient reports chronic low back pain not new or different no leg pain or swelling no history of DVT or PE    Nursing Notes were reviewed. REVIEW OF SYSTEMS    (2-9 systems for level 4, 10 or more for level 5)     Review of Systems  Pertinent findings documented in the history of present illness  Except as noted above the remainder of the review of systems was reviewed and negative. PAST MEDICAL HISTORY     Past Medical History:   Diagnosis Date    Cancer ([address]     squamous cell skin left upper chest    Gout     Onychomycosis of toenail     LEFT INDEX TOE     Seasonal allergic rhinitis     Uric acid nephrolithiasis          SURGICAL HISTORY       Past Surgical History:   Procedure Laterality Date    BACK SURGERY      ablations & epidurals    BREAST CYST INCISION AND DRAINAGE  2000s    rectal    COLONOSCOPY  12/15/14        CYST REMOVAL  06/28/2018    DR. WALKER  -- right thumb    CYSTOSCOPY  8/20/14    EYE SURGERY      Phaco with IOL OU    LITHOTRIPSY      x 5 with last 2020 [facility]    SEPTOPLASTY N/A 4/5/2022 Dis Brother         cardiac stents    No Known Problems Son     No Known Problems Daughter           SOCIAL HISTORY       Social History     Socioeconomic History    Marital status:    Tobacco Use    Smoking status: Never    Smokeless tobacco: Never   Vaping Use    Vaping Use: Never used   Substance and Sexual Activity    Alcohol use: No    Drug use: Never       PHYSICAL EXAM    (up to 7 for level 4, 8 or more for level 5)     ED Triage Vitals [10/13/22 1131]   BP Temp Temp Source Heart Rate Resp SpO2 Height Weight   (!) 153/99 97.2 °F (36.2 °C) Temporal 82 18 96 % -- --       Physical Exam  General appearance: Patient is awake alert interactive appropriate nontoxic in no acute distress  Eyes pupils are equal and reactive sclera white conjunctive are pink  Oral pharyngeal cavity is pink with good moisture, no exudates or ulcerations   Neck: Nontender to palpation, supple no meningeal signs no adenopathy no JVD  Heart: Regular rate and rhythm no gross murmurs rubs or clicks  Lungs: Breath sounds are clear with good air movement throughout no active wheezes rales or rhonchi no respiratory distress pulse ox 96% on room air  Chest wall: There is focal point reproducible pain on palpation over left-sided rib 910 area anterior axillary line. There is no overlying ecchymosis no palpable bony step-off or deformity underlying breath sounds are clear no pleural rub. Abdomen: Soft nontender with good bowel sounds no rebound rigidity or guarding no firm or pulsatile masses  Back: Nontender to palpation no costovertebral angle tenderness  Skin: Warm and dry without rashes  Lower extremities: No edema or calf tenderness or asymmetry.     DIAGNOSTIC RESULTS       RADIOLOGY:   Non-plain film images such as CT, Ultrasound and MRI are read by the radiologist. Plain radiographic images are visualized and preliminarily interpreted by the emergency physician with the below findings:    X-ray left ribs and chest multiple views interpreted by ED physician indication rib pain/injury: Heart mediastinum are within normal limits the lung fields are clear there were no acute consolidations vascular congestion or pneumothorax appreciated, no acute bony fractures appreciated    Interpretation per the Radiologist below, if available at the time of this note:    XR RIBS LEFT INCLUDE CHEST (MIN 3 VIEWS)    (Results Pending)       EMERGENCY DEPARTMENT COURSE and DIFFERENTIAL DIAGNOSIS/MDM:   Vitals:    Vitals:    10/13/22 1131   BP: (!) 153/99   Pulse: 82   Resp: 18   Temp: 97.2 °F (36.2 °C)   TempSrc: Temporal   SpO2: 96%   Weight: 230 lb (104.3 kg)   Height: 5' 11\" (1.803 m)     Treatment and course: Patient was given Vicodin and Naprosyn p.o. initiated here, patient was given an incentive spirometer  (Allergies discussed, patient states has had Vicodin Percocet before without difficulty)    FINAL IMPRESSION      1. Rib pain on left side          DISPOSITION/PLAN   DISPOSITION    Patient discharged home advised rest ice locally, use the incentive spirometer every 1-2 hours over the next 2 days. Was given a home-going prescription for Vicodin No. 15 Naprosyn No. 10 patient advised to return if any change or worsening increasing pain difficulty breathing fever weak or dizzy feeling. Patient to follow-up with primary physician for repeat assessment in the next 2 to 3 days    PATIENT REFERRED TO:  Eliana Chacon DO  [address] [place] [address]  [phone]    In 2 days      DISCHARGE MEDICATIONS:  New Prescriptions    HYDROCODONE-ACETAMINOPHEN (NORCO) 5-325 MG PER TABLET    Take 1 tablet by mouth every 6 hours as needed for Pain for up to 3 days. Intended supply: 3 days. Take lowest dose possible to manage pain    NAPROXEN (NAPROSYN) 500 MG TABLET    Take 1 tablet by mouth 2 times daily as needed for Pain     Controlled Substances Monitoring:     No flowsheet data found.     (Please note that portions of this note were completed with a voice recognition program.  Efforts were made to edit the dictations but occasionally words are mis-transcribed.)    Trevor Thacker DO (electronically signed)  Attending Emergency Physician            Trevor Thacker DO  10/18/22 6611

## 2023-02-15 PROBLEM — M47.814 DJD (DEGENERATIVE JOINT DISEASE), THORACIC: Status: ACTIVE | Noted: 2023-02-15

## 2023-02-15 PROBLEM — E79.0 HYPERURICEMIA: Status: ACTIVE | Noted: 2023-02-15

## 2023-02-15 PROBLEM — M67.449 MUCOUS CYST OF FINGER: Status: ACTIVE | Noted: 2023-02-15

## 2023-02-15 PROBLEM — R22.1 NECK MASS: Status: ACTIVE | Noted: 2023-02-15

## 2023-02-15 PROBLEM — M54.50 LUMBAGO: Status: ACTIVE | Noted: 2023-02-15

## 2023-02-15 PROBLEM — M54.6 THORACIC SPINE PAIN: Status: ACTIVE | Noted: 2023-02-15

## 2023-02-15 PROBLEM — M10.9 GOUT: Status: ACTIVE | Noted: 2023-02-15

## 2023-02-15 PROBLEM — N20.0 LEFT RENAL STONE: Status: ACTIVE | Noted: 2023-02-15

## 2023-02-15 PROBLEM — M67.40 GANGLION: Status: ACTIVE | Noted: 2023-02-15

## 2023-02-15 PROBLEM — M54.16 LUMBAR RADICULOPATHY, CHRONIC: Status: ACTIVE | Noted: 2023-02-15

## 2023-02-15 PROBLEM — N28.1 RENAL CYST, RIGHT: Status: ACTIVE | Noted: 2023-02-15

## 2023-02-15 PROBLEM — M54.6 CHRONIC BILATERAL THORACIC BACK PAIN: Status: ACTIVE | Noted: 2023-02-15

## 2023-02-15 PROBLEM — S46.919S: Status: ACTIVE | Noted: 2023-02-15

## 2023-02-15 PROBLEM — N52.9 ERECTILE DYSFUNCTION: Status: ACTIVE | Noted: 2023-02-15

## 2023-02-15 PROBLEM — R06.83 SNORING: Status: ACTIVE | Noted: 2023-02-15

## 2023-02-15 PROBLEM — M47.816 LUMBAR SPONDYLOSIS: Status: ACTIVE | Noted: 2023-02-15

## 2023-02-15 PROBLEM — M99.01 SEGMENTAL AND SOMATIC DYSFUNCTION OF CERVICAL REGION: Status: ACTIVE | Noted: 2023-02-15

## 2023-02-15 PROBLEM — R39.15 URINARY URGENCY: Status: ACTIVE | Noted: 2023-02-15

## 2023-02-15 PROBLEM — M99.03 SEGMENTAL AND SOMATIC DYSFUNCTION OF LUMBAR REGION: Status: ACTIVE | Noted: 2023-02-15

## 2023-02-15 PROBLEM — M18.10 ARTHRITIS OF CARPOMETACARPAL (CMC) JOINT OF THUMB: Status: ACTIVE | Noted: 2023-02-15

## 2023-02-15 PROBLEM — L73.9 FOLLICULITIS: Status: ACTIVE | Noted: 2023-02-15

## 2023-02-15 PROBLEM — J01.00 ACUTE NON-RECURRENT MAXILLARY SINUSITIS: Status: ACTIVE | Noted: 2023-02-15

## 2023-02-15 PROBLEM — G47.30 SLEEP APNEA: Status: ACTIVE | Noted: 2023-02-15

## 2023-02-15 PROBLEM — M54.2 CERVICALGIA: Status: ACTIVE | Noted: 2023-02-15

## 2023-02-15 PROBLEM — M47.812 DJD (DEGENERATIVE JOINT DISEASE), CERVICAL: Status: ACTIVE | Noted: 2023-02-15

## 2023-02-15 PROBLEM — M99.02 SEGMENTAL AND SOMATIC DYSFUNCTION OF THORACIC REGION: Status: ACTIVE | Noted: 2023-02-15

## 2023-02-15 PROBLEM — M99.05 SEGMENTAL AND SOMATIC DYSFUNCTION OF PELVIC REGION: Status: ACTIVE | Noted: 2023-02-15

## 2023-02-15 PROBLEM — J33.9 NASAL POLYPS: Status: ACTIVE | Noted: 2023-02-15

## 2023-02-15 PROBLEM — M25.569 KNEE PAIN: Status: ACTIVE | Noted: 2023-02-15

## 2023-02-15 PROBLEM — G89.29 CHRONIC BILATERAL THORACIC BACK PAIN: Status: ACTIVE | Noted: 2023-02-15

## 2023-02-15 PROBLEM — M75.81 TENDINITIS OF RIGHT ROTATOR CUFF: Status: ACTIVE | Noted: 2023-02-15

## 2023-02-15 PROBLEM — G47.19 EXCESSIVE DAYTIME SLEEPINESS: Status: ACTIVE | Noted: 2023-02-15

## 2023-02-15 PROBLEM — G89.29 CHRONIC MIDLINE BACK PAIN: Status: ACTIVE | Noted: 2023-02-15

## 2023-02-15 PROBLEM — M54.9 CHRONIC MIDLINE BACK PAIN: Status: ACTIVE | Noted: 2023-02-15

## 2023-02-15 PROBLEM — J32.4 CHRONIC PANSINUSITIS: Status: ACTIVE | Noted: 2023-02-15

## 2023-02-15 PROBLEM — I10 HYPERTENSION: Status: ACTIVE | Noted: 2023-02-15

## 2023-02-15 PROBLEM — M99.00 SEGMENTAL AND SOMATIC DYSFUNCTION OF HEAD REGION: Status: ACTIVE | Noted: 2023-02-15

## 2023-02-15 PROBLEM — K21.00 GASTROESOPHAGEAL REFLUX DISEASE WITH ESOPHAGITIS: Status: ACTIVE | Noted: 2023-02-15

## 2023-02-15 PROBLEM — M51.24 OTHER INTERVERTEBRAL DISC DISPLACEMENT, THORACIC REGION: Status: ACTIVE | Noted: 2023-02-15

## 2023-02-15 RX ORDER — NAPROXEN 500 MG/1
1 TABLET ORAL 2 TIMES DAILY PRN
COMMUNITY
Start: 2022-10-13 | End: 2023-03-29

## 2023-02-15 RX ORDER — CETIRIZINE HYDROCHLORIDE 10 MG/1
1 TABLET ORAL DAILY
COMMUNITY

## 2023-02-15 RX ORDER — ACETYLCYSTEINE 600 MG
CAPSULE ORAL
COMMUNITY

## 2023-02-15 RX ORDER — MULTIVITAMIN
1 TABLET ORAL DAILY
COMMUNITY

## 2023-02-15 RX ORDER — TAMARIND SEED/TURMERIC EXTRACT 250 MG
1 TABLET ORAL DAILY
COMMUNITY
Start: 2022-06-27

## 2023-02-15 RX ORDER — HYDROCHLOROTHIAZIDE 12.5 MG/1
1 TABLET ORAL DAILY
COMMUNITY
Start: 2019-07-24 | End: 2023-03-29 | Stop reason: SDUPTHER

## 2023-02-15 RX ORDER — PHENYLPROPANOLAMINE/CLEMASTINE 75-1.34MG
TABLET, EXTENDED RELEASE ORAL
COMMUNITY

## 2023-02-15 RX ORDER — POTASSIUM CITRATE 15 MEQ/1
1 TABLET, EXTENDED RELEASE ORAL DAILY
COMMUNITY

## 2023-02-15 RX ORDER — TIZANIDINE 4 MG/1
1 TABLET ORAL 3 TIMES DAILY PRN
COMMUNITY
Start: 2021-02-18 | End: 2023-10-20 | Stop reason: SDUPTHER

## 2023-02-15 RX ORDER — TALC
1 POWDER (GRAM) TOPICAL NIGHTLY
COMMUNITY

## 2023-02-15 RX ORDER — FLUTICASONE PROPIONATE 50 MCG
1 SPRAY, SUSPENSION (ML) NASAL DAILY
COMMUNITY
Start: 2017-12-28

## 2023-02-15 RX ORDER — ALLOPURINOL 300 MG/1
0.5 TABLET ORAL DAILY
COMMUNITY
End: 2023-03-29 | Stop reason: SDUPTHER

## 2023-02-15 RX ORDER — ASPIRIN 81 MG/1
1 TABLET ORAL DAILY
COMMUNITY

## 2023-02-15 RX ORDER — ACETAMINOPHEN 500 MG
1 TABLET ORAL DAILY
COMMUNITY

## 2023-02-15 RX ORDER — TADALAFIL 20 MG/1
1 TABLET ORAL
COMMUNITY
Start: 2021-04-02 | End: 2023-03-29 | Stop reason: SDUPTHER

## 2023-03-23 ENCOUNTER — HOSPITAL ENCOUNTER (OUTPATIENT)
Dept: DATA CONVERSION | Facility: HOSPITAL | Age: 68
End: 2023-03-23
Attending: PAIN MEDICINE | Admitting: PAIN MEDICINE
Payer: MEDICARE

## 2023-03-23 DIAGNOSIS — M54.16 RADICULOPATHY, LUMBAR REGION: ICD-10-CM

## 2023-03-29 ENCOUNTER — OFFICE VISIT (OUTPATIENT)
Dept: PRIMARY CARE | Facility: CLINIC | Age: 68
End: 2023-03-29
Payer: MEDICARE

## 2023-03-29 VITALS
HEART RATE: 79 BPM | WEIGHT: 240 LBS | SYSTOLIC BLOOD PRESSURE: 128 MMHG | RESPIRATION RATE: 16 BRPM | TEMPERATURE: 96 F | HEIGHT: 70 IN | BODY MASS INDEX: 34.36 KG/M2 | OXYGEN SATURATION: 96 % | DIASTOLIC BLOOD PRESSURE: 76 MMHG

## 2023-03-29 DIAGNOSIS — M54.16 LUMBAR RADICULOPATHY, CHRONIC: ICD-10-CM

## 2023-03-29 DIAGNOSIS — I10 PRIMARY HYPERTENSION: Primary | ICD-10-CM

## 2023-03-29 DIAGNOSIS — N20.0 LEFT RENAL STONE: ICD-10-CM

## 2023-03-29 DIAGNOSIS — E79.0 HYPERURICEMIA: ICD-10-CM

## 2023-03-29 DIAGNOSIS — E78.5 DYSLIPIDEMIA, GOAL LDL BELOW 130: ICD-10-CM

## 2023-03-29 DIAGNOSIS — N52.1 ERECTILE DYSFUNCTION DUE TO DISEASES CLASSIFIED ELSEWHERE: ICD-10-CM

## 2023-03-29 PROCEDURE — 1159F MED LIST DOCD IN RCRD: CPT | Performed by: FAMILY MEDICINE

## 2023-03-29 PROCEDURE — 1036F TOBACCO NON-USER: CPT | Performed by: FAMILY MEDICINE

## 2023-03-29 PROCEDURE — 1160F RVW MEDS BY RX/DR IN RCRD: CPT | Performed by: FAMILY MEDICINE

## 2023-03-29 PROCEDURE — 99214 OFFICE O/P EST MOD 30 MIN: CPT | Performed by: FAMILY MEDICINE

## 2023-03-29 PROCEDURE — 3078F DIAST BP <80 MM HG: CPT | Performed by: FAMILY MEDICINE

## 2023-03-29 PROCEDURE — 3008F BODY MASS INDEX DOCD: CPT | Performed by: FAMILY MEDICINE

## 2023-03-29 PROCEDURE — 3074F SYST BP LT 130 MM HG: CPT | Performed by: FAMILY MEDICINE

## 2023-03-29 RX ORDER — TADALAFIL 20 MG/1
20 TABLET ORAL DAILY PRN
Qty: 10 TABLET | Refills: 3 | Status: SHIPPED | OUTPATIENT
Start: 2023-03-29 | End: 2023-03-31 | Stop reason: SDUPTHER

## 2023-03-29 RX ORDER — ALLOPURINOL 300 MG/1
150 TABLET ORAL DAILY
Qty: 45 TABLET | Refills: 3 | Status: SHIPPED | OUTPATIENT
Start: 2023-03-29 | End: 2023-10-20 | Stop reason: SDUPTHER

## 2023-03-29 RX ORDER — HYDROCHLOROTHIAZIDE 12.5 MG/1
12.5 TABLET ORAL DAILY
Qty: 90 TABLET | Refills: 3 | Status: SHIPPED | OUTPATIENT
Start: 2023-03-29 | End: 2023-10-20 | Stop reason: SDUPTHER

## 2023-03-29 ASSESSMENT — ENCOUNTER SYMPTOMS
OCCASIONAL FEELINGS OF UNSTEADINESS: 0
LOSS OF SENSATION IN FEET: 0
DEPRESSION: 0

## 2023-03-29 NOTE — PROGRESS NOTES
"wSubjective   Patient ID: John Garvin is a 68 y.o. male who presents for Hypertension (4 month follow up ).  HPI  68-year-old gent with a history of hyperuricemic and also uric acid kidney stones hypertension done very well on low-dose hydrochlorothiazide does take 100 mg of allopurinol to prevent gouty episodes as well as uric acid stones both of which have done well  Patient is followed by pain clinic for intermittent low back pain and takes occasional Zanaflex and Motrin otherwise stable at this time  Bilateral renal cyst and nonobstructing kidney stones since followed by urology as well.  Patient retired otherwise denies any headaches fever exertional or resting chest pain shortness of breath palpitations or new GI- issues.  Reviewed no reported side effects.  Stable no active problems needs refill of his Cialis which is helping him with allopurinol and HCTZ  Review of Systems   Constitutional:  Negative for fatigue.   HENT:  Positive for rhinorrhea. Negative for sinus pressure.    Respiratory:  Negative for cough, chest tightness and stridor.    Cardiovascular:  Negative for chest pain, palpitations and leg swelling.   Gastrointestinal:  Negative for abdominal pain and blood in stool.   Genitourinary:  Positive for frequency. Negative for dysuria, flank pain, hematuria and testicular pain.   Musculoskeletal:  Positive for arthralgias, back pain and myalgias. Negative for gait problem.   Skin:  Negative for rash.   Neurological:  Negative for dizziness, weakness and headaches.   Hematological:  Negative for adenopathy.   Psychiatric/Behavioral:  Negative for sleep disturbance and suicidal ideas.        Objective   /76 (BP Location: Right arm, Patient Position: Sitting, BP Cuff Size: Large adult)   Pulse 79   Temp 35.6 °C (96 °F) (Temporal)   Resp 16   Ht 1.778 m (5' 10\")   Wt 109 kg (240 lb)   SpO2 96%   BMI 34.44 kg/m²     02/14/23 1301Prostate Specific Antigen, Screen   Collected: 02/14/23 0843  " Final result   Prostate Specific Antigen,Screen 2.15 ng/mL      02/14/23 1255Basic Metabolic Panel   Collected: 02/14/23 0843  Final result   Glucose 94 mg/dL Anion Gap 12 mmol/L   Sodium 140 mmol/L Urea Nitrogen 22 mg/dL   Potassium 4.2 mmol/L Creatinine 1.03 mg/dL   Chloride 104 mmol/L GFR MALE 79 mL/min/1.73m2    Bicarbonate 28 mmol/L Calcium 10.2 mg/dL   02/14/23 1232Cholesterol, LDL Direct   Collected: 02/14/23 0843  Final result   LDL Direct 147 High  mg/dL      01/05/23 1333Comprehensive Metabolic Panel   Collected: 01/05/23 1107  Final result   Glucose 87 mg/dL GFR MALE >90 mL/min/1.73m2    Sodium 138 mmol/L Calcium 9.5 mg/dL   Potassium 3.7 mmol/L Albumin 4.1 g/dL   Chloride 104 mmol/L Alkaline Phosphatase 71 U/L   Bicarbonate 26 mmol/L Total Protein 7.9 g/dL   Anion Gap 12 mmol/L AST 22 U/L   Urea Nitrogen 16 mg/dL Total Bilirubin 0.9 mg/dL   Creatinine 0.87 mg/dL ALT (SGPT) 26 U/L     patient: No (inaccessible in [facility])    0 Result Notes   important suggestion  Newer results are available. Click to view them now.          Component Ref Range & Units 1 yr ago   Prostate Specific Antigen,Screen 0.00 - 4.00 ng/mL 1.60   Comment: The FDA requires that the method used for PSA assay be  reported to the physician. Values obtained with different  assay methods must not be used interchangeably. This test  was performed at [facility]  Order: [id number]  Status: Final result       Visible to patient: No (inaccessible in [facility])    0 Result Notes            Component Ref Range & Units 2 mo ago  (1/5/23) 2 yr ago  (11/21/20) 2 yr ago  (11/9/20) 2 yr ago  (10/20/20)   WBC 4.4 - 11.3 x10E9/L 6.3 10.7 6.0 4.7   RBC 4.50 - 5.90 x10E12/L 4.89 4.23 Low  5.10 4.78   Hemoglobin 13.5 - 17.5 g/dL 14.6 12.5 Low  15.5 14.3   Hematocrit 41.0 - 52.0 % 44.1 39.5 Low  47.9 45.3   MCV 80 - 100 fL 90 93 94 95   MCHC 32.0 - 36.0 g/dL 33.1 31.6 Low  32.4 31.6 Low    Platelets 150 - 450 x10E9/L 200 183 205 169   RDW 11.5  - 14.5 % 12.3 12.2 12.3 12.4   Neutrophils % 40.0 - 80.0 % 59.5   50.4   Immature Granulocytes %, Automated 0.0 - 0.9 % 0.3   0.2 CM   Comment:  Immature Granulocyte Count (IG) includes promyelocytes,   myelocytes and metamyelocytes but does not include bands.   Percent differential counts (%) should be interpreted in the   context of the absolute cell counts (cells/L).   Lymphocytes % 13.0 - 44.0 % 26.1   31.4   Monocytes % 2.0 - 10.0 % 10.0   10.1   Eosinophils % 0.0 - 6.0 % 3.3   6.8   Basophils % 0.0 - 2.0 % 0.8   1.1   Neutrophils Absolute 1.20 - 7.70 x10E9/L 3.77   2.39   Lymphocytes Absolute 1.20 - 4.80 x10E9/L 1.65   1.49   Monocytes Absolute 0.10 - 1.00 x10E9/L 0.63   0.48   Eosinophils Absolute 0.00 - 0.70 x10E9/L 0.21   0.32   Basophils Absolute 0.00 - 0.10 x10E9/L 0.05   0.05   nRBC             ntains abnormal data Cholesterol, LDL Direct  Order: [id number]  Status: Final result       Visible to patient: No (inaccessible in [facility])    0 Result Notes       1 HM Topic   important suggestion  Newer results are available. Click to view them now.           Component Ref Range & Units 1 yr ago 3 yr ago   LDL Direct 0 - 129 mg/dL 148 High  131 High  CM   Comment: Elevated levels of LDL cholesterol are recognized as a key  factor in the development of atherosclerosis and CHD. The  direct LDL cholesterol test can be used to assess  cardiovascular risk and monitor therapy as a follow up to  a lipid profile when triglycerides are significantly elevated.   Resulting Agency  [facility]                Specimen Collected: 01/28/22 09:30 Last Resulted: 01/28/22 14:06        Lab Flowsheet        Order Details        View Encounter        Lab and Collection Details        Routing        Result History                   Physical Exam  Vital signs reviewed and normal  General inspection reveals a well-developed well-nourished individual in no acute distress  Neck neck stable without masses adenopathy  bruits or rigidity thyroid is normal  Respiratory-clear anteriorly and posteriorly  Cardiovascular RSR without murmurs gallop or ectopy  Abdominal no organomegaly mass or rebound no CVA tenderness.  Musculoskeletal minimal tenderness the paravertebral muscles as well as medial SI joints bilaterally no posterior thoracic rashes  Peripheral vascular-no symmetric or asymmetric edema no motor sensorivascular deficits  Mood-mood is stable without anxiety depressive or cognitive issues  Skin-no rash petechiae or jaundice  Reviewed still elevated  148 with otherwise normal CMP electrolytes normal kidney functions      Assessment/Plan   Problem List Items Addressed This Visit          Circulatory    Hypertension       Genitourinary    Erectile dysfunction       Other    Hyperuricemia   Increasing liquids followed by urology notably urine kidney functions been stable in the past we will continue hydrochlorothiazide for uric acid stones hypertension  Continue 100 mg allopurinol for hyperuricemia continue follow-up with pain clinic for low back as well as low back exercises Zanaflex if needed and Motrin  Follow-up in 6 months or earlier if interval problems continue healthy routines with low-salt low-fat low carbohydrate diet  We will check other health maintenance lab when seen next.  Clinically stable  @discharge  The above diagnosis and treatment plan was discussed with the patient patient will continue appropriate diet and exercise as reviewed  Patient will recheck earlier if any interval problems of significance or clinical worsening of the above problems.  Agrees above surveillance.  All question were addressed regarding above meds

## 2023-03-31 DIAGNOSIS — N52.1 ERECTILE DYSFUNCTION DUE TO DISEASES CLASSIFIED ELSEWHERE: ICD-10-CM

## 2023-03-31 RX ORDER — TADALAFIL 20 MG/1
20 TABLET ORAL DAILY PRN
Qty: 30 TABLET | Refills: 3 | Status: SHIPPED | OUTPATIENT
Start: 2023-03-31 | End: 2023-10-20 | Stop reason: SDUPTHER

## 2023-03-31 NOTE — TELEPHONE ENCOUNTER
Patient called requesting a new prescription for cialis 90 day supply. Patient stated it is cheaper to get a 90 day supply.   Please advise

## 2023-04-02 ASSESSMENT — ENCOUNTER SYMPTOMS
ARTHRALGIAS: 1
STRIDOR: 0
DIZZINESS: 0
CHEST TIGHTNESS: 0
COUGH: 0
PALPITATIONS: 0
WEAKNESS: 0
BACK PAIN: 1
HEADACHES: 0
MYALGIAS: 1
BLOOD IN STOOL: 0
HEMATURIA: 0
SINUS PRESSURE: 0
ABDOMINAL PAIN: 0
SLEEP DISTURBANCE: 0
RHINORRHEA: 1
FLANK PAIN: 0
DYSURIA: 0
ADENOPATHY: 0
FATIGUE: 0
FREQUENCY: 1

## 2023-06-15 ENCOUNTER — HOSPITAL ENCOUNTER (OUTPATIENT)
Dept: DATA CONVERSION | Facility: HOSPITAL | Age: 68
End: 2023-06-15
Attending: PAIN MEDICINE | Admitting: PAIN MEDICINE
Payer: MEDICARE

## 2023-06-15 DIAGNOSIS — M47.816 SPONDYLOSIS WITHOUT MYELOPATHY OR RADICULOPATHY, LUMBAR REGION: ICD-10-CM

## 2023-07-10 ENCOUNTER — TELEPHONE (OUTPATIENT)
Dept: PRIMARY CARE | Facility: CLINIC | Age: 68
End: 2023-07-10
Payer: MEDICARE

## 2023-07-10 DIAGNOSIS — N20.0 LEFT RENAL STONE: ICD-10-CM

## 2023-07-10 DIAGNOSIS — N28.1 RENAL CYST, RIGHT: ICD-10-CM

## 2023-07-10 NOTE — TELEPHONE ENCOUNTER
Patient called requesting a KUB to check to see where a kidney stone is, he stated he is having left side flank and back pain.   Please advise

## 2023-08-24 LAB
ALANINE AMINOTRANSFERASE (SGPT) (U/L) IN SER/PLAS: 23 U/L (ref 10–52)
ALBUMIN (G/DL) IN SER/PLAS: 4.7 G/DL (ref 3.4–5)
ALKALINE PHOSPHATASE (U/L) IN SER/PLAS: 72 U/L (ref 33–136)
ANION GAP IN SER/PLAS: 12 MMOL/L (ref 10–20)
ASPARTATE AMINOTRANSFERASE (SGOT) (U/L) IN SER/PLAS: 20 U/L (ref 9–39)
BASOPHILS (10*3/UL) IN BLOOD BY AUTOMATED COUNT: 0.05 X10E9/L (ref 0–0.1)
BASOPHILS/100 LEUKOCYTES IN BLOOD BY AUTOMATED COUNT: 0.5 % (ref 0–2)
BILIRUBIN TOTAL (MG/DL) IN SER/PLAS: 1.2 MG/DL (ref 0–1.2)
CALCIUM (MG/DL) IN SER/PLAS: 10 MG/DL (ref 8.6–10.3)
CARBON DIOXIDE, TOTAL (MMOL/L) IN SER/PLAS: 29 MMOL/L (ref 21–32)
CHLORIDE (MMOL/L) IN SER/PLAS: 101 MMOL/L (ref 98–107)
CREATININE (MG/DL) IN SER/PLAS: 0.9 MG/DL (ref 0.5–1.3)
EOSINOPHILS (10*3/UL) IN BLOOD BY AUTOMATED COUNT: 0.08 X10E9/L (ref 0–0.7)
EOSINOPHILS/100 LEUKOCYTES IN BLOOD BY AUTOMATED COUNT: 0.9 % (ref 0–6)
ERYTHROCYTE DISTRIBUTION WIDTH (RATIO) BY AUTOMATED COUNT: 12.5 % (ref 11.5–14.5)
ERYTHROCYTE MEAN CORPUSCULAR HEMOGLOBIN CONCENTRATION (G/DL) BY AUTOMATED: 32.5 G/DL (ref 32–36)
ERYTHROCYTE MEAN CORPUSCULAR VOLUME (FL) BY AUTOMATED COUNT: 91 FL (ref 80–100)
ERYTHROCYTES (10*6/UL) IN BLOOD BY AUTOMATED COUNT: 5.02 X10E12/L (ref 4.5–5.9)
GFR MALE: >90 ML/MIN/1.73M2
GLUCOSE (MG/DL) IN SER/PLAS: 82 MG/DL (ref 74–99)
HEMATOCRIT (%) IN BLOOD BY AUTOMATED COUNT: 45.6 % (ref 41–52)
HEMOGLOBIN (G/DL) IN BLOOD: 14.8 G/DL (ref 13.5–17.5)
IMMATURE GRANULOCYTES/100 LEUKOCYTES IN BLOOD BY AUTOMATED COUNT: 0.4 % (ref 0–0.9)
INR IN PPP BY COAGULATION ASSAY: 1 (ref 0.9–1.1)
LEUKOCYTES (10*3/UL) IN BLOOD BY AUTOMATED COUNT: 9.1 X10E9/L (ref 4.4–11.3)
LYMPHOCYTES (10*3/UL) IN BLOOD BY AUTOMATED COUNT: 1.96 X10E9/L (ref 1.2–4.8)
LYMPHOCYTES/100 LEUKOCYTES IN BLOOD BY AUTOMATED COUNT: 21.5 % (ref 13–44)
MONOCYTES (10*3/UL) IN BLOOD BY AUTOMATED COUNT: 0.82 X10E9/L (ref 0.1–1)
MONOCYTES/100 LEUKOCYTES IN BLOOD BY AUTOMATED COUNT: 9 % (ref 2–10)
NEUTROPHILS (10*3/UL) IN BLOOD BY AUTOMATED COUNT: 6.18 X10E9/L (ref 1.2–7.7)
NEUTROPHILS/100 LEUKOCYTES IN BLOOD BY AUTOMATED COUNT: 67.7 % (ref 40–80)
NRBC (PER 100 WBCS) BY AUTOMATED COUNT: 0 /100 WBC (ref 0–0)
PLATELETS (10*3/UL) IN BLOOD AUTOMATED COUNT: 224 X10E9/L (ref 150–450)
POTASSIUM (MMOL/L) IN SER/PLAS: 4.1 MMOL/L (ref 3.5–5.3)
PROTEIN TOTAL: 7.9 G/DL (ref 6.4–8.2)
PROTHROMBIN TIME (PT) IN PPP BY COAGULATION ASSAY: 11.2 SEC (ref 9.8–12.8)
SODIUM (MMOL/L) IN SER/PLAS: 138 MMOL/L (ref 136–145)
UREA NITROGEN (MG/DL) IN SER/PLAS: 20 MG/DL (ref 6–23)

## 2023-09-05 ENCOUNTER — HOSPITAL ENCOUNTER (OUTPATIENT)
Dept: DATA CONVERSION | Facility: HOSPITAL | Age: 68
End: 2023-09-05
Attending: ORTHOPAEDIC SURGERY | Admitting: ORTHOPAEDIC SURGERY
Payer: MEDICARE

## 2023-09-05 DIAGNOSIS — S83.281A OTHER TEAR OF LATERAL MENISCUS, CURRENT INJURY, RIGHT KNEE, INITIAL ENCOUNTER: ICD-10-CM

## 2023-09-05 DIAGNOSIS — S83.231A COMPLEX TEAR OF MEDIAL MENISCUS, CURRENT INJURY, RIGHT KNEE, INITIAL ENCOUNTER: ICD-10-CM

## 2023-09-05 DIAGNOSIS — S83.241A OTHER TEAR OF MEDIAL MENISCUS, CURRENT INJURY, RIGHT KNEE, INITIAL ENCOUNTER: ICD-10-CM

## 2023-09-07 VITALS — WEIGHT: 236.33 LBS | BODY MASS INDEX: 33.09 KG/M2 | HEIGHT: 71 IN

## 2023-09-08 VITALS — HEIGHT: 71 IN | WEIGHT: 236.33 LBS | BODY MASS INDEX: 33.09 KG/M2

## 2023-09-26 ENCOUNTER — HOSPITAL ENCOUNTER (OUTPATIENT)
Dept: DATA CONVERSION | Facility: HOSPITAL | Age: 68
End: 2023-09-26
Attending: PAIN MEDICINE | Admitting: PAIN MEDICINE
Payer: MEDICARE

## 2023-09-26 DIAGNOSIS — M54.16 RADICULOPATHY, LUMBAR REGION: ICD-10-CM

## 2023-09-29 VITALS — WEIGHT: 230.38 LBS | HEIGHT: 71 IN | BODY MASS INDEX: 32.25 KG/M2

## 2023-09-29 VITALS — HEIGHT: 71 IN | WEIGHT: 230.38 LBS | BODY MASS INDEX: 32.25 KG/M2

## 2023-10-01 NOTE — OP NOTE
Post Operative Note:     Post-Procedure Diagnosis: Right knee medial lateral  meniscus tear   Procedure: 1.  Right knee arthroscopy with partial  medial and lateral meniscectomy (20%)  2.   3.   4.   5.   Surgeon: Herbert Aviles MD   Resident/Fellow/Other Assistant: Herbert Elmore PA-C   Estimated Blood Loss (mL): Minimal   Specimen: no   Findings: Medial lateral meniscus tear     Operative Report Dictated:  Dictation: not applicable - note contains Operative  Report   Note Recipients: Alexander Garza, Herbert Augustin MD - [phone] [Preferred]   Operative Report:    Indications: 68-year-old male with persistent right knee pain despite conservative treatment elected proceed with surgery for right knee arthroscopy with meniscectomy    Risks benefits and alternatives to surgery were discussed including but not limited to Infection, bleeding, neurovascular injury, pain and dysfunction, hardware related complications including cutout failure breakage, loss of function, motion, and permanent  disability as well as the cardiovascular and pulmonary complications from anesthesia including death and DVT. Patient and family accept these risks.  We discussed specifically post meniscectomy pain, incomplete pain relief, meniscus retear, progressive arthritis, intraoperative decisions in regards to other pathology, and the potential for future revision surgeries including arthroplasty    Patient identified in the preop area.  Right lower extremity marked and confirmed with patient as the operative site.  Brought back to the operating room and anesthetized under general anesthesia.  Operative lower extremity was then prepped and draped in standard sterile fashion.  Patient, site and procedure were confirmed with timeout.  Everyone in the room agreed.  Preop antibiotics were given.    We then made standard medial and lateral arthroscopy portal and the scope was introduced.  Medial joint space : Patient a complex tear of the  posterior horn of the medial meniscus.  Had a radial as well as a horizontal cleavage component.  Resected about 20% overall the meniscus.  Is predominately undersurface inferior flap which was resected.   Patient did have a focal area of grade 4 cartilage change in the very medial aspect of the medial femoral condyle less than a centimeter.  It was a limited chondroplasty in this area some loose cartilage flakes which were present.  Diffuse grade 3 change  on the tibia.  ACL and notch were normal  Lateral joint space: Inner edge of meniscus tear with a horizontal cleavage of the mid body.  Resected PROM of the inferior leaflet about 20% contoured anteriorly.  Had fairly healthy cartilage on the lateral side with some deep fissuring.  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    Wounds were irrigated with normal saline.  Portals were closed with standard fashion.  Sterile dressings were applied.  Patient was then awoken from general anesthesia and sent to the PACU in stable condition.    Herbert Elmore PA-C was present throughout the entire case. Given the nature of the disease process and the procedure, a skilled surgical first assistant was necessary during the case. The assistant was necessary to hold retractors and to manipulate  the extremity during the procedure. A certified scrub tech was at the back table managing the instruments and supplies for the surgical case.       Electronic Signatures:  Herbert Aviles)  (Signed 05-Sep-2023 08:33)   Authored: Post Operative Note, Note Completion      Last Updated: 05-Sep-2023 08:33 by Herbert Aviles)

## 2023-10-01 NOTE — OP NOTE
PROCEDURE DETAILS    Preoperative Diagnosis:  Lumbar radicular pain, M54.16    Postoperative Diagnosis:  Lumbar radicular pain, M54.16    Surgeon: Ceasar Maher  Resident/Fellow/Other Assistant: Carolina Saba    Procedure:  Lumbar epidural steroid injection under fluoroscopic guidance     Anesthesia: No anesthesiologist associated with this case  Estimated Blood Loss: 0  Findings: none         Operative Report:       Operation  Midline lumbar epidural steroid injection. Level performed L5-S1    Surgical indications  The patient is here today to receive an epidural steroid injection to assist with pain.    Operative report  The patient was taken to the procedure room, was placed into a prone positioning. The lumbar area was prepped and draped sterilely in the normal fashion. Under fluoroscopic guidance the epidural space was identified. The skin and subcutaneous tissue was  anesthetized with 1% lidocaine. An 18-gauge Tuohy needle was introduced using the normal saline loss-of-resistance technique. Once the loss of resistance had been achieved, final positioning of the needle was confirmed with negative aspiration of heme  and CSF, with the injection of contrast material showing spread in the epidural space, confirmed in AP and lateral view. Then the patient received 80 mg of Depo-Medrol diluted into normal saline preservative-free and 2 mL of 0.25% bupivacaine making total  volume of 8 mL. The patient tolerated the procedure well, was taken to the recovery room, allowed to recover sufficient amount of time and then was discharged home in stable condition. The patient was advised to followup with the [facility]  to reassess improvement on as-needed basis.    Thank you for allowing me to participate in the care of this patient.                        Attestation:   Note Completion:  Attending Attestation I performed the procedure without a resident         Electronic Signatures:  Ceasar Maher  (MD)  (Signed 26-Sep-2023 08:55)   Authored: Post-Operative Note, Chart Review, Note Completion      Last Updated: 26-Sep-2023 08:55 by Ceasar Maher)

## 2023-10-02 NOTE — OP NOTE
PROCEDURE DETAILS    Preoperative Diagnosis:  Lumbar radicular syndrome, M54.16    Postoperative Diagnosis:  Lumbar radicular syndrome, M54.16    Surgeon: Ceasra Maher  Resident/Fellow/Other Assistant: Bridget Castaneda    Procedure:  Lumbar epidural steroid injection under fluoroscopic guidance   Anesthesia: No anesthesiologist associated with this case  Estimated Blood Loss: 0  Findings: None         Operative Report:       Operation  Midline lumbar epidural steroid injection. Level performed L5-S1    Surgical indications  The patient is here today to receive an epidural steroid injection to assist with pain.    Operative report  The patient was taken to the procedure room, was placed into a prone positioning. The lumbar area was prepped and draped sterilely in the normal fashion. Under fluoroscopic guidance the epidural space was identified. The skin and subcutaneous tissue was  anesthetized with 1% lidocaine. An 18-gauge Tuohy needle was introduced using the normal saline loss-of-resistance technique. Once the loss of resistance had been achieved, final positioning of the needle was confirmed with negative aspiration of heme  and CSF, with the injection of contrast material showing spread in the epidural space, confirmed in AP and lateral view. Then the patient received 80 mg of Depo-Medrol diluted into normal saline preservative-free and 2 mL of 0.25% bupivacaine making total  volume of 8 mL. The patient tolerated the procedure well, was taken to the recovery room, allowed to recover sufficient amount of time and then was discharged home in stable condition. The patient was advised to followup with the [facility]  to reassess improvement on as-needed basis.    Thank you for allowing me to participate in the care of this patient.                        Attestation:   Note Completion:  Attending Attestation I performed the procedure without a resident         Electronic Signatures:  Gunnar  Ceasar CARPENTER)  (Signed 23-Mar-2023 08:40)   Authored: Post-Operative Note, Chart Review, Note Completion      Last Updated: 23-Mar-2023 08:40 by Ceasar Maher)

## 2023-10-02 NOTE — OP NOTE
PROCEDURE DETAILS    Preoperative Diagnosis:  Lumbar spondylosis, M47.816    Postoperative Diagnosis:  Lumbar spondylosis, M47.816    Surgeon: Ceasar Maher  Resident/Fellow/Other Assistant: Bridget Castaneda    Procedure:  Radiofrequency ablation bilateral   medial nerve branch L3-L4, L4-L5,    Anesthesia: No anesthesiologist associated with this case  Estimated Blood Loss: 0  Findings: None         Operative Report:   Surgical Indications  The patient is here today to receive a radiofrequency ablation of the above-mentioned nerve to assist with the pain condition.    Operative Report  The patient was taken to the procedure room, was placed into a prone positioning. The skin was prepped and draped sterilely in the normal fashion.  Under fluoroscopic guidance the medial nerve root branches were identified and the patient was throughout the procedure monitored by the nursing staff. The skin and subcutaneous tissues were anesthetized with 1% lidocaine. Then 20-gauge RF needles were  introduced into the approximation of the medial nerve branches at the above mentioned level and confirmed in AP and oblique view, with negative aspiration of heme and CSF, with positive sensory stimulation and negative motor stimulation. Then the patient  received 1 mL of 0.5% bupivacaine per site and radiofrequency ablation at temperature of 80°C for 90 seconds was achieved. Patient tolerated the procedure well, was taken to the recovery room, allowed to recover for a sufficient amount of time and  then was discharged home in stable condition. The patient was advised to followup with the [facility] to reassess the improvement on as-needed basis. Take you for allowing me to participate in the care of this patient.                        Attestation:   Note Completion:  Attending Attestation I performed the procedure without a resident         Electronic Signatures:  Ceasar Maher)  (Signed 15-Chris-2023  11:19)   Authored: Post-Operative Note, Chart Review, Note Completion      Last Updated: 15-Chris-2023 11:19 by Ceasar Maher)

## 2023-10-11 ENCOUNTER — APPOINTMENT (OUTPATIENT)
Dept: PRIMARY CARE | Facility: CLINIC | Age: 68
End: 2023-10-11
Payer: MEDICARE

## 2023-10-20 ENCOUNTER — OFFICE VISIT (OUTPATIENT)
Dept: PRIMARY CARE | Facility: CLINIC | Age: 68
End: 2023-10-20
Payer: MEDICARE

## 2023-10-20 VITALS
OXYGEN SATURATION: 96 % | BODY MASS INDEX: 31.92 KG/M2 | WEIGHT: 228 LBS | RESPIRATION RATE: 16 BRPM | HEART RATE: 79 BPM | SYSTOLIC BLOOD PRESSURE: 122 MMHG | TEMPERATURE: 97.5 F | HEIGHT: 71 IN | DIASTOLIC BLOOD PRESSURE: 76 MMHG

## 2023-10-20 DIAGNOSIS — G89.29 CHRONIC MIDLINE BACK PAIN, UNSPECIFIED BACK LOCATION: ICD-10-CM

## 2023-10-20 DIAGNOSIS — E79.0 HYPERURICEMIA: Primary | ICD-10-CM

## 2023-10-20 DIAGNOSIS — M54.9 CHRONIC MIDLINE BACK PAIN, UNSPECIFIED BACK LOCATION: ICD-10-CM

## 2023-10-20 DIAGNOSIS — N52.1 ERECTILE DYSFUNCTION DUE TO DISEASES CLASSIFIED ELSEWHERE: ICD-10-CM

## 2023-10-20 DIAGNOSIS — E78.5 DYSLIPIDEMIA: ICD-10-CM

## 2023-10-20 DIAGNOSIS — N20.0 LEFT RENAL STONE: ICD-10-CM

## 2023-10-20 PROBLEM — R22.1 NECK MASS: Status: RESOLVED | Noted: 2023-02-15 | Resolved: 2023-10-20

## 2023-10-20 PROCEDURE — 1159F MED LIST DOCD IN RCRD: CPT | Performed by: FAMILY MEDICINE

## 2023-10-20 PROCEDURE — 1160F RVW MEDS BY RX/DR IN RCRD: CPT | Performed by: FAMILY MEDICINE

## 2023-10-20 PROCEDURE — 99213 OFFICE O/P EST LOW 20 MIN: CPT | Performed by: FAMILY MEDICINE

## 2023-10-20 PROCEDURE — 1036F TOBACCO NON-USER: CPT | Performed by: FAMILY MEDICINE

## 2023-10-20 PROCEDURE — 1125F AMNT PAIN NOTED PAIN PRSNT: CPT | Performed by: FAMILY MEDICINE

## 2023-10-20 PROCEDURE — 3008F BODY MASS INDEX DOCD: CPT | Performed by: FAMILY MEDICINE

## 2023-10-20 RX ORDER — ALLOPURINOL 300 MG/1
150 TABLET ORAL DAILY
Qty: 45 TABLET | Refills: 3 | Status: SHIPPED | OUTPATIENT
Start: 2023-10-20

## 2023-10-20 RX ORDER — TIZANIDINE 4 MG/1
4 TABLET ORAL 3 TIMES DAILY PRN
Qty: 90 TABLET | Refills: 1 | Status: SHIPPED | OUTPATIENT
Start: 2023-10-20 | End: 2024-05-24 | Stop reason: SDUPTHER

## 2023-10-20 RX ORDER — TADALAFIL 20 MG/1
20 TABLET ORAL DAILY PRN
Qty: 30 TABLET | Refills: 3 | Status: SHIPPED | OUTPATIENT
Start: 2023-10-20

## 2023-10-20 RX ORDER — HYDROCHLOROTHIAZIDE 12.5 MG/1
12.5 TABLET ORAL DAILY
Qty: 90 TABLET | Refills: 3 | Status: SHIPPED | OUTPATIENT
Start: 2023-10-20

## 2023-10-20 NOTE — PROGRESS NOTES
"Subjective   Patient ID: John Garvin is a 68 y.o. male who presents for Hypertension (7 month follow up ).  HPI  Is an 68-year-old gent with a history of recent meniscectomy by Dr. Guillen for his right knee is doing well  Also takes hydrochlorothiazide for uric acid kidney stones in the past as well as allopurinol is then very nicely on this regimen  Has intermittent chronic low back discomfort needs a refill of his Zanaflex that he takes.  Otherwise the patient denies any significant numbness weakness or paresthesias lower extremities  Observing healthy routines denies any resting or exertional chest pain shortness breath palpitations or new GI- issues.  Cialis with good results for his ED.  Otherwise no new physical problems and is doing well postoperatively for his right knee  Review of Systems   Constitutional:  Negative for fatigue, fever and unexpected weight change.   Eyes:  Negative for visual disturbance.   Respiratory:  Negative for cough, chest tightness and shortness of breath.    Cardiovascular:  Negative for chest pain, palpitations and leg swelling.   Gastrointestinal:  Positive for abdominal pain. Negative for blood in stool and vomiting.   Genitourinary:  Positive for frequency. Negative for dysuria and hematuria.   Musculoskeletal:  Positive for arthralgias, back pain and myalgias.   Skin:  Negative for rash.   Neurological:  Negative for weakness, light-headedness and headaches.   Psychiatric/Behavioral:  Negative for behavioral problems, confusion, sleep disturbance and suicidal ideas.        Objective   /76 (BP Location: Right arm, Patient Position: Sitting, BP Cuff Size: Large adult)   Pulse 79   Temp 36.4 °C (97.5 °F) (Temporal)   Resp 16   Ht 1.801 m (5' 10.91\")   Wt 103 kg (228 lb)   SpO2 96%   BMI 31.88 kg/m²   Prostate Specific Antigen, Screen  Order: [id number]  Status: Final result       Visible to patient: Yes (not seen)    0 Result Notes       Component  Ref Range & Units 8 " mo ago 1 yr ago   Prostate Specific Antigen,Screen  0.00 - 4.00 ng/mL 2.15 1.60 CM   Comment: The FDA requires that the method used for PSA assay be  reported to the physician. Values obtained with different  assay methods must not be used interchangeably. This test  was performed at [facility] using the Access  [employer]br[employer] PSA assay is a two-site immunoenzymatic sandwich  assay. The assay is approved for measurement of  prostate-specific antigen (PSA)in serum and may be used  in conjunction with a digital rectal examination in men  50 years and older as an aid in detection of prostate  cancer.      Comprehensive Metabolic Panel  Order: [id number]  Status: Final result       Visible to patient: Yes (seen)    0 Result Notes            Component  Ref Range & Units 2 mo ago  (8/24/23) 8 mo ago  (2/14/23) 9 mo ago  (1/5/23) 1 yr ago  (1/28/22) 2 yr ago  (11/9/20) 3 yr ago  (10/20/20) 3 yr ago  (10/29/19)   Glucose  74 - 99 mg/dL 82 94 87 98 89 89 92   Sodium  136 - 145 mmol/L 138 140 138 140 141 142 141   Potassium  3.5 - 5.3 mmol/L 4.1 4.2 3.7 4.4 4.2 4.2 4.5   Chloride  98 - 107 mmol/L 101 104 104 103 104 105 103   Bicarbonate  21 - 32 mmol/L 29 28 26 31 30 29 30   Anion Gap  10 - 20 mmol/L 12 12 12 10 11 12 13   Urea Nitrogen  6 - 23 mg/dL 20 22 16 16 18 19 20   Creatinine  0.50 - 1.30 mg/dL 0.90 1.03 0.87 0.97 1.02 1.04 0.94   GFR MALE  >90 mL/min/1.73m2 >90 79 CM >90 CM 85 CM      Comment:  CALCULATIONS OF ESTIMATED GFR ARE PERFORMED   USING THE 2021 CKD-EPI STUDY REFIT EQUATION   WITHOUT THE RACE VARIABLE FOR THE IDMS-TRACEABLE   CREATININE METHODS.    https://jasn.asnjournals.org/content/early/2021/09/22/ASN.2126115591   Calcium  8.6 - 10.3 mg/dL 10.0 10.2 9.5 9.4 9.8 9.5 9.5 R   Albumin  3.4 - 5.0 g/dL 4.7  4.1 4.3  4.2 4.3   Alkaline Phosphatase  33 - 136 U/L 72  71 73  76 89   Total Protein  6.4 - 8.2 g/dL 7.9  7.9 7.7  7.2 6.9   AST  9 - 39 U/L 20  22 24  21 19   Total Bilirubin  0.0 - 1.2 mg/dL 1.2   0.9 1.1  0.8 0.6   ALT (SGPT)  10 - 52 U/L 23  26 CM 31 CM  22 CM 22 CM   Comment:  Patients t      CBC and Auto Differential  Order: [id number]  Status: Final result       Visible to patient: Yes (seen)    0 Result Notes          Component  Ref Range & Units 2 mo ago  (8/24/23) 9 mo ago  (1/5/23) 2 yr ago  (11/21/20) 2 yr ago  (11/9/20) 3 yr ago  (10/20/20)   WBC  4.4 - 11.3 x10E9/L 9.1 6.3 10.7 6.0 4.7   nRBC  0.0 - 0.0 /100 WBC 0.0  0.0 0.0    RBC  4.50 - 5.90 x10E12/L 5.02 4.89 4.23 Low  5.10 4.78   Hemoglobin  13.5 - 17.5 g/dL 14.8 14.6 12.5 Low  15.5 14.3   Hematocrit  41.0 - 52.0 % 45.6 44.1 39.5 Low  47.9 45.3   MCV  80 - 100 fL 91 90 93 94 95   MCHC  32.0 - 36.0 g/dL 32.5 33.1 31.6 Low  32.4 31.6 Low    Platelets  150 - 450 x10E9/L 224 200 183 205 169   RDW  11.5 - 14.5 % 12.5 12.3 12.2 12.3 12.4   Neutrophils %  40.0 - 80.0 % 67.7 59.5   50.4   Immature Granulocytes %, Automated  0.0 - 0.9 % 0.4 0.3 CM   0.2 CM   Comment:  Immature Granulocyte Count (IG) includes promyelocytes,   myelocytes and metamyelocytes but does not include bands.   Percent differential counts (%) should be interpreted in the   context of the absolute cell counts (cells/L).   Lymphocytes %  13.0 - 44.0 % 21.5 26.1   31.4   Monocytes %  2.0 - 10.0 % 9.0          ontains abnormal data Cholesterol, LDL Direct  Order: [id number]  Status: Final result       Visible to patient: Yes (not seen)    0 Result Notes       1 HM Topic        Component  Ref Range & Units 8 mo ago 1 yr ago 3 yr ago   LDL Direct  0 - 129 mg/dL 147 High  148 High   High  CM   Comment: Elevated levels of LDL cholesterol are recognized as a key  factor in the development of atherosclerosis and CHD. The  direct LDL               Physical Exam      Normal vital signs  General inspection of the pleasant or active mesomorphic individual in no acute distress  Neck neck is all without masses adenopathy bruits or rigidity thyroid is normal  Chest chest is clear without  wheezing rales or rhonchi  Cardiovascular RSR without murmurs gallop or ectopy  Abdominal no organomegaly mass renal rebound of the mid upper abdomen no CVA tenderness  Musculoskeletal slight edema of the right knee postoperatively with reduced flexion otherwise no redness warmth or effusion minimal tenderness at the midline L4-L5 is back but otherwise no striking posterior thoracic rashes CVA tenderness and no neurological deficits of the lower extremities.  Straight leg raising is negative  Mood mood is stable without anxiety depressive or cognitive issues      Assessment/Plan   Problem List Items Addressed This Visit       Chronic midline back pain    Erectile dysfunction    Hyperuricemia    Hypertension    Left renal stone   We will continue his hydrochlorothiazide for prevention of uric acid kidney stones as well as his allopurinol increasing liquid strongly urged  Follow with Dr. Aviles for intermittent low back discomfort and eventually his medial meniscectomy of the right knee  Continue Cialis as side effects reviewed  I did review his normal PSA CMP CBC but slightly elevated LDL he agrees to improving low-fat diet and repeating his LDL in 4 months after improvement.  Otherwise earlier preop labs stable and reviewed with the patient call if interval problems follow-up in 4 months for the order for lipid panel  @discharge  The above diagnosis and treatment plan was discussed with the patient patient will continue appropriate diet and exercise as reviewed  Patient will recheck earlier if any interval problems of significance or clinical worsening of the above problems.  Agrees above surveillance.  All question were addressed regarding above meds

## 2023-10-23 ENCOUNTER — OFFICE VISIT (OUTPATIENT)
Dept: ORTHOPEDIC SURGERY | Facility: CLINIC | Age: 68
End: 2023-10-23
Payer: MEDICARE

## 2023-10-23 DIAGNOSIS — Z09 POSTOPERATIVE FOLLOW-UP: Primary | ICD-10-CM

## 2023-10-23 PROCEDURE — 3008F BODY MASS INDEX DOCD: CPT | Performed by: ORTHOPAEDIC SURGERY

## 2023-10-23 PROCEDURE — 1036F TOBACCO NON-USER: CPT | Performed by: ORTHOPAEDIC SURGERY

## 2023-10-23 PROCEDURE — 1160F RVW MEDS BY RX/DR IN RCRD: CPT | Performed by: ORTHOPAEDIC SURGERY

## 2023-10-23 PROCEDURE — 1159F MED LIST DOCD IN RCRD: CPT | Performed by: ORTHOPAEDIC SURGERY

## 2023-10-23 PROCEDURE — 1125F AMNT PAIN NOTED PAIN PRSNT: CPT | Performed by: ORTHOPAEDIC SURGERY

## 2023-10-23 PROCEDURE — 99024 POSTOP FOLLOW-UP VISIT: CPT | Performed by: ORTHOPAEDIC SURGERY

## 2023-10-23 NOTE — PROGRESS NOTES
HPI  Patient presents for follow-up from his right knee scope with 20% medial meniscectomy.  States that he is doing well has a little bit of stiffness in the knee though although he has low back issues and is stiff in general on the right side.  Denies any instability in the knee    PE  General Appearance: WNWD A&Ox3, in no acute distress  Right knee exam shows incisions well-healed full range of motion some mild medial joint line soreness to palpation knee is stable/throughout Homans' sign negative lower extremity neurovascular intact    Assessment  Postop follow-up      Treatment  Encourage patient keep doing range of motion staying active.  Discussed with him it can take up to 4 months postoperatively for aches and pains go away  See him back in 2 months for possible cortisone injection  No x-ray needed next visit      This note was prepared using Dragon voice recognition software. The details of this note are correct and the note has been reviewed and corrected to the best of my ability. Some grammatical areas may persist related to the Dragon software

## 2023-10-24 ASSESSMENT — ENCOUNTER SYMPTOMS
ARTHRALGIAS: 1
BLOOD IN STOOL: 0
DYSURIA: 0
PALPITATIONS: 0
UNEXPECTED WEIGHT CHANGE: 0
BACK PAIN: 1
COUGH: 0
SLEEP DISTURBANCE: 0
ABDOMINAL PAIN: 1
MYALGIAS: 1
LIGHT-HEADEDNESS: 0
CONFUSION: 0
SHORTNESS OF BREATH: 0
FEVER: 0
FREQUENCY: 1
CHEST TIGHTNESS: 0
HEADACHES: 0
VOMITING: 0
FATIGUE: 0
WEAKNESS: 0
HEMATURIA: 0

## 2023-12-07 ENCOUNTER — TELEPHONE (OUTPATIENT)
Dept: PAIN MEDICINE | Facility: CLINIC | Age: 68
End: 2023-12-07
Payer: MEDICARE

## 2023-12-08 ENCOUNTER — OFFICE VISIT (OUTPATIENT)
Dept: ORTHOPEDIC SURGERY | Facility: CLINIC | Age: 68
End: 2023-12-08
Payer: MEDICARE

## 2023-12-08 DIAGNOSIS — M17.11 PRIMARY OSTEOARTHRITIS OF RIGHT KNEE: Primary | ICD-10-CM

## 2023-12-08 DIAGNOSIS — Z09 POSTOPERATIVE FOLLOW-UP: ICD-10-CM

## 2023-12-08 PROCEDURE — 99213 OFFICE O/P EST LOW 20 MIN: CPT | Mod: PO | Performed by: PHYSICIAN ASSISTANT

## 2023-12-08 PROCEDURE — 99213 OFFICE O/P EST LOW 20 MIN: CPT | Performed by: PHYSICIAN ASSISTANT

## 2023-12-08 PROCEDURE — 2500000005 HC RX 250 GENERAL PHARMACY W/O HCPCS: Mod: PO | Performed by: PHYSICIAN ASSISTANT

## 2023-12-08 PROCEDURE — 1036F TOBACCO NON-USER: CPT | Performed by: PHYSICIAN ASSISTANT

## 2023-12-08 PROCEDURE — 3008F BODY MASS INDEX DOCD: CPT | Performed by: PHYSICIAN ASSISTANT

## 2023-12-08 PROCEDURE — 1160F RVW MEDS BY RX/DR IN RCRD: CPT | Performed by: PHYSICIAN ASSISTANT

## 2023-12-08 PROCEDURE — 20610 DRAIN/INJ JOINT/BURSA W/O US: CPT | Mod: RT,ZK | Performed by: PHYSICIAN ASSISTANT

## 2023-12-08 PROCEDURE — 2500000004 HC RX 250 GENERAL PHARMACY W/ HCPCS (ALT 636 FOR OP/ED): Mod: PO | Performed by: PHYSICIAN ASSISTANT

## 2023-12-08 PROCEDURE — 1159F MED LIST DOCD IN RCRD: CPT | Performed by: PHYSICIAN ASSISTANT

## 2023-12-08 PROCEDURE — 1125F AMNT PAIN NOTED PAIN PRSNT: CPT | Performed by: PHYSICIAN ASSISTANT

## 2023-12-08 RX ORDER — LIDOCAINE HYDROCHLORIDE 10 MG/ML
8 INJECTION INFILTRATION; PERINEURAL
Status: COMPLETED | OUTPATIENT
Start: 2023-12-08 | End: 2023-12-08

## 2023-12-08 RX ORDER — TRIAMCINOLONE ACETONIDE 40 MG/ML
40 INJECTION, SUSPENSION INTRA-ARTICULAR; INTRAMUSCULAR
Status: COMPLETED | OUTPATIENT
Start: 2023-12-08 | End: 2023-12-08

## 2023-12-08 RX ADMIN — TRIAMCINOLONE ACETONIDE 40 MG: 40 INJECTION, SUSPENSION INTRA-ARTICULAR; INTRAMUSCULAR at 15:14

## 2023-12-08 RX ADMIN — LIDOCAINE HYDROCHLORIDE 8 ML: 10 INJECTION, SOLUTION INFILTRATION; PERINEURAL at 15:14

## 2023-12-08 NOTE — PROGRESS NOTES
HPI  Patient presents today for follow-up from his left knee scope in September.  States that the knee is still stiff at times especially with the cold weather he like a cortisone shot today.  Also was asking about his right thumb as he had an injection in it in the past by another provider and is starting to act up again    PE  General Appearance: WNWD A&Ox3, in no acute distress  Right hand exam shows a skin intact positive shoulder sign positive compression grind test.  Tender to palpation over the CMC joint.  Right knee exam shows the skin intact trace effusion appreciated medial joint line tenderness palpation lower extremity gross neurovascular intact    Assessment  Right thumb CMC osteoarthritis  Right knee OA      Treatment  I recommend he follow-up with Dr. Morelia Sharma for thumb cortisone injection and surgical consultation.  Right knee cortisone shot was performed today.  He can continue his ibuprofen prescription.  See him back in 4 to 6 weeks for reevaluation no x-ray needed    L Inj/Asp: R knee on 12/8/2023 3:14 PM  Indications: pain  Details: 22 G needle, anterolateral approach  Medications: 8 mL lidocaine 10 mg/mL (1 %); 40 mg triamcinolone acetonide 40 mg/mL  Outcome: tolerated well, no immediate complications  Procedure, treatment alternatives, risks and benefits explained, specific risks discussed. Consent was given by the patient. Immediately prior to procedure a time out was called to verify the correct patient, procedure, equipment, support staff and site/side marked as required. Patient was prepped and draped in the usual sterile fashion.            This note was prepared using Dragon voice recognition software. The details of this note are correct and the note has been reviewed and corrected to the best of my ability. Some grammatical areas may persist related to the Dragon software    Past Medical History:   Diagnosis Date    Acute maxillary sinusitis, unspecified 11/27/2019    Acute  non-recurrent maxillary sinusitis    Calculus of kidney 03/14/2019    Recurrent kidney stones    Calculus of kidney 03/10/2020    Uric acid kidney stone    Cervicogenic headache 12/22/2020    Headache, cervicogenic    Dorsalgia, unspecified     Upper back pain    Epigastric pain 05/29/2020    Abdominal wall pain in epigastric region    Ganglion, right hand 01/04/2023    Ganglion, finger of right hand    Insect bite (nonvenomous) of other part of head, initial encounter 09/08/2021    Insect bite of cheek, initial encounter    Pain due to genitourinary prosthetic devices, implants and grafts, initial encounter (CMS/[facility])     Pain due to ureteral stent    Personal history of other diseases of the musculoskeletal system and connective tissue     History of back pain    Personal history of other diseases of urinary system 03/10/2020    History of hematuria    Personal history of other specified conditions 08/20/2021    History of chronic fatigue    Personal history of other specified conditions 02/19/2020    History of left flank pain    Personal history of urinary calculi 06/25/2020    History of renal calculi    Personal history of urinary calculi     History of kidney stones       Medication Documentation Review Audit       Reviewed by Kathy Puente MA (Medical Assistant) on 12/08/23 at 0941      Medication Order Taking? Sig Documenting Provider Last Dose Status   acetaminophen (TYLENOL ORAL) [id number] No Take by mouth. CAPS Historical Provider, MD Taking Active   acetylcysteine 600 mg capsule [id number] No Take by mouth. Historical Provider, MD Taking Active   allopurinol (Zyloprim) 300 mg tablet [id number]  Take 0.5 tablets (150 mg) by mouth once daily. Alexander Garza,   Active   aspirin 81 mg EC tablet [id number] No Take 1 tablet (81 mg) by mouth once daily. Historical Provider, MD Taking Active   cartilage-collagen-bor-hyalur 40-5-3.3 mg tablet [id number] No Take 1 tablet by mouth 1 (one) time each day. Historical  Provider, MD Not Taking Active   cetirizine (ZyrTEC) 10 mg tablet [id number] No Take 1 tablet (10 mg) by mouth once daily. Historical Provider, MD Taking Active   cholecalciferol (Vitamin D-3) 50 mcg (2,000 unit) capsule [id number] No Take 1 capsule (2,000 Units) by mouth once daily. Historical Provider, MD Taking Active   DIETARY SUPPLEMENT ORAL [id number] No Take by mouth. GRICELDA KHAN Historical Provider, MD Taking Active   fish oil concentrate (Omega-3) 120-180 mg capsule [id number] No Take 1 tablet by mouth 1 (one) time each day. Historical Provider, MD Taking Active   fluticasone (Flonase) 50 mcg/actuation nasal spray [id number] No Administer 1 spray into each nostril once daily. Historical Provider, MD Taking Active   hyalur ac/chond sul/colg II/AA (HYALURONIC ACID, CHOND-COLLGN, ORAL) [id number] No Take by mouth. Historical Provider, MD Taking Active   hydroCHLOROthiazide (HYDRODiuril) 12.5 mg tablet [id number]  Take 1 tablet (12.5 mg) by mouth once daily. Alexander Garza, DO  Active   ibuprofen (Motrin) capsule [id number] No Take by mouth. Historical Provider, MD Not Taking Active   melatonin 3 mg tablet [id number] No Take 1 tablet (3 mg) by mouth once daily at bedtime. Historical Provider, MD Taking Active   multivitamin tablet [id number] No Take 1 tablet by mouth once daily. Historical Provider, MD Taking Active   potassium citrate CR (Urocit-K-15) 15 mEq ER tablet [id number] No Take 1 tablet (15 mEq) by mouth once daily. Historical Provider, MD Taking Active   tadalafil (Cialis) 20 mg tablet [id number]  Take 1 tablet (20 mg) by mouth once daily as needed for erectile dysfunction. Alexander Garza, DO  Active   tiZANidine (Zanaflex) 4 mg tablet [id number]  Take 1 tablet (4 mg) by mouth 3 times a day as needed for muscle spasms. Alexander Garza, DO  Active                    Allergies   Allergen Reactions    Adhesive Hives     hives for one week. :    Mold Unknown    Morphine Hives     Very Severe Reaction: Swelling    Yeast Other        Social History     Socioeconomic History    Marital status:      Spouse name: Not on file    Number of children: Not on file    Years of education: Not on file    Highest education level: Not on file   Occupational History    Not on file   Tobacco Use    Smoking status: Never     Passive exposure: Past    Smokeless tobacco: Never   Substance and Sexual Activity    Alcohol use: Not Currently    Drug use: Never    Sexual activity: Not on file   Other Topics Concern    Not on file   Social History Narrative    Not on file     Social Determinants of Health     Financial Resource Strain: Not on file   Food Insecurity: Not on file   Transportation Needs: Not on file   Physical Activity: Not on file   Stress: Not on file   Social Connections: Not on file   Intimate Partner Violence: Not on file   Housing Stability: Not on file       Past Surgical History:   Procedure Laterality Date    OTHER SURGICAL HISTORY  03/14/2019    Lithotripsy    OTHER SURGICAL HISTORY  03/14/2019    Nasal septoplasty    OTHER SURGICAL HISTORY  12/14/2022    Ganglion cyst excision    OTHER SURGICAL HISTORY  06/30/2022    Percutaneous nephrolithotomy    OTHER SURGICAL HISTORY  07/16/2021    Kidney surgery    OTHER SURGICAL HISTORY  05/20/2019    Partial atrial septal defect repair    OTHER SURGICAL HISTORY  05/20/2019    Lithotripsy

## 2023-12-11 ENCOUNTER — OFFICE VISIT (OUTPATIENT)
Dept: PAIN MEDICINE | Facility: CLINIC | Age: 68
End: 2023-12-11
Payer: MEDICARE

## 2023-12-11 DIAGNOSIS — M54.16 LUMBAR RADICULOPATHY: Primary | ICD-10-CM

## 2023-12-11 PROCEDURE — 1125F AMNT PAIN NOTED PAIN PRSNT: CPT | Performed by: PAIN MEDICINE

## 2023-12-11 PROCEDURE — 1036F TOBACCO NON-USER: CPT | Performed by: PAIN MEDICINE

## 2023-12-11 PROCEDURE — 1159F MED LIST DOCD IN RCRD: CPT | Performed by: PAIN MEDICINE

## 2023-12-11 PROCEDURE — 99214 OFFICE O/P EST MOD 30 MIN: CPT | Performed by: PAIN MEDICINE

## 2023-12-11 PROCEDURE — 3008F BODY MASS INDEX DOCD: CPT | Performed by: PAIN MEDICINE

## 2023-12-11 PROCEDURE — 1160F RVW MEDS BY RX/DR IN RCRD: CPT | Performed by: PAIN MEDICINE

## 2023-12-11 ASSESSMENT — PAIN SCALES - GENERAL: PAINLEVEL_OUTOF10: 8

## 2023-12-11 ASSESSMENT — PAIN - FUNCTIONAL ASSESSMENT: PAIN_FUNCTIONAL_ASSESSMENT: 0-10

## 2023-12-11 ASSESSMENT — PAIN DESCRIPTION - DESCRIPTORS: DESCRIPTORS: ACHING

## 2023-12-11 NOTE — PROGRESS NOTES
Subjective   John Garvin is a 68 y.o. male who presents for evaluation of his back pain.  The pain is located in the throasic andlumbar area and radiates to legs. Up tp the knee   . Symptoms interfere with physical activity and walking pain is described as aching and is rated as 8 on a scale of 1-10. The pain is described as constant with a score of 8 when he has to stand.  Described positive response to the prior radiofrequency ablation and the epidural steroid injection that was performed in September was able to participate in day-to-day activities such as camping and he went on vacation currently his symptoms are affecting his day-to-day activity has been taking Tylenol and Advil on as needed basis and describes some minimal improvement with them , continues to do the physical therapy exercises at home.    Past Medical History:   Diagnosis Date    Acute maxillary sinusitis, unspecified 11/27/2019    Acute non-recurrent maxillary sinusitis    Calculus of kidney 03/14/2019    Recurrent kidney stones    Calculus of kidney 03/10/2020    Uric acid kidney stone    Cervicogenic headache 12/22/2020    Headache, cervicogenic    Dorsalgia, unspecified     Upper back pain    Epigastric pain 05/29/2020    Abdominal wall pain in epigastric region    Ganglion, right hand 01/04/2023    Ganglion, finger of right hand    Insect bite (nonvenomous) of other part of head, initial encounter 09/08/2021    Insect bite of cheek, initial encounter    Pain due to genitourinary prosthetic devices, implants and grafts, initial encounter (CMS/[facility])     Pain due to ureteral stent    Personal history of other diseases of the musculoskeletal system and connective tissue     History of back pain    Personal history of other diseases of urinary system 03/10/2020    History of hematuria    Personal history of other specified conditions 08/20/2021    History of chronic fatigue    Personal history of other specified conditions 02/19/2020    History  of left flank pain    Personal history of urinary calculi 06/25/2020    History of renal calculi    Personal history of urinary calculi     History of kidney stones     Past Surgical History:   Procedure Laterality Date    OTHER SURGICAL HISTORY  03/14/2019    Lithotripsy    OTHER SURGICAL HISTORY  03/14/2019    Nasal septoplasty    OTHER SURGICAL HISTORY  12/14/2022    Ganglion cyst excision    OTHER SURGICAL HISTORY  06/30/2022    Percutaneous nephrolithotomy    OTHER SURGICAL HISTORY  07/16/2021    Kidney surgery    OTHER SURGICAL HISTORY  05/20/2019    Partial atrial septal defect repair    OTHER SURGICAL HISTORY  05/20/2019    Lithotripsy       I have reviewed the nurses notes and am aware of family/social history.     Review of Systems  All 13 systems were reviewed and are within normal levels except as noted below or per HPI. Positive and pertinent negative responses are noted below or in the HPI   Denied any fever or chills. No weight loss and no night sweats. No cough or sputum production. No diarrhea   No constipation  No bladder and bowel incontinence and no other changes in bladder and bowel. No skin changes.   Denied opioids diversion and abuse and denies alcoholism. Denies overuse of  pain medications.   Objective   Physical Exam      Past medical history no interval changes has been noted    On physical examination    General   Alert, oriented x3 pleasant and cooperative. Does not look in any major distress.    HEENT  Pupils normal in size. Ears, nose, mouth, and throat appear to be in normal condition.  Head atraumatic      No signs of sedation or signs of withdrawal apparent.    Psychiatric   No signs of depression apparent.    Neuro   No focal neurological deficit apparent. Ambulation at baseline.      Respiratory  No respiratory distress     Abdomen  no distention     Skin  No skin markings supportive of recent IV drug usage .    Cardiovascular  Regular rate and rhythm     ASSESSMENT:   68 years  old with history and physical examination supportive of lumbago lumbar spondylosis thoracic spondylosis responded in the past to epidural steroid injection and radiofrequency ablation with reoccurrence of the symptoms    Plan  Advised the patient about the different modalities available for the treatment of his condition I would recommend repeating the lumbar epidural steroid injection to be performed under fluoroscopic guidance targeting the L4-L5 or the L5-S1 level with usage of contrast material to confirm needle placement on as-needed basis I would also recommend to repeat the radiofrequency ablation of the medial nerve branches bilaterally at the level of the L3-L4 L4-L5 patient has at home now some the tramadol and he was advised to take it as a last resort option and will be followed up on as needed basis       The above clinical summary has been dictated with voice recognition software. It has not been proofread for grammatical errors, typographical mistakes, or other semantic inconsistencies.    Thank you for visiting our office today. It was our pleasure to take part in your healthcare.     Please do not hesitate to contact the pain clinic after your visit with any questions or concerns at  M-F 8-4 pm       Ceasar Maher M.D.  Medical Director , Division of Pain Medicine [facility]   of Anesthesiology and Pain Medicine  [facility] [employer] [place] [address]     Office: [phone]  Fax: [phone]

## 2024-02-01 ENCOUNTER — HOSPITAL ENCOUNTER (OUTPATIENT)
Dept: RADIOLOGY | Facility: HOSPITAL | Age: 69
Discharge: HOME | End: 2024-02-01
Payer: MEDICARE

## 2024-02-01 ENCOUNTER — HOSPITAL ENCOUNTER (OUTPATIENT)
Dept: PAIN MEDICINE | Facility: CLINIC | Age: 69
Discharge: HOME | End: 2024-02-01
Payer: MEDICARE

## 2024-02-01 VITALS
DIASTOLIC BLOOD PRESSURE: 89 MMHG | OXYGEN SATURATION: 98 % | SYSTOLIC BLOOD PRESSURE: 164 MMHG | HEART RATE: 69 BPM | RESPIRATION RATE: 16 BRPM | TEMPERATURE: 96.6 F

## 2024-02-01 DIAGNOSIS — M54.16 LUMBAR RADICULOPATHY: ICD-10-CM

## 2024-02-01 PROCEDURE — 64483 NJX AA&/STRD TFRM EPI L/S 1: CPT | Mod: 59

## 2024-02-01 PROCEDURE — 2500000004 HC RX 250 GENERAL PHARMACY W/ HCPCS (ALT 636 FOR OP/ED): Performed by: PAIN MEDICINE

## 2024-02-01 PROCEDURE — 62323 NJX INTERLAMINAR LMBR/SAC: CPT | Performed by: PAIN MEDICINE

## 2024-02-01 PROCEDURE — 2500000004 HC RX 250 GENERAL PHARMACY W/ HCPCS (ALT 636 FOR OP/ED): Mod: JW

## 2024-02-01 PROCEDURE — 2550000001 HC RX 255 CONTRASTS: Performed by: PAIN MEDICINE

## 2024-02-01 PROCEDURE — 64484 NJX AA&/STRD TFRM EPI L/S EA: CPT

## 2024-02-01 RX ORDER — BUPIVACAINE HYDROCHLORIDE 2.5 MG/ML
5 INJECTION, SOLUTION EPIDURAL; INFILTRATION; INTRACAUDAL ONCE
Status: COMPLETED | OUTPATIENT
Start: 2024-02-01 | End: 2024-02-01

## 2024-02-01 RX ORDER — METHYLPREDNISOLONE ACETATE 40 MG/ML
80 INJECTION, SUSPENSION INTRA-ARTICULAR; INTRALESIONAL; INTRAMUSCULAR; SOFT TISSUE ONCE
Status: COMPLETED | OUTPATIENT
Start: 2024-02-01 | End: 2024-02-01

## 2024-02-01 RX ADMIN — METHYLPREDNISOLONE ACETATE 80 MG: 40 INJECTION, SUSPENSION INTRALESIONAL; INTRAMUSCULAR; INTRASYNOVIAL; SOFT TISSUE at 08:28

## 2024-02-01 RX ADMIN — BUPIVACAINE HYDROCHLORIDE 12.5 MG: 2.5 INJECTION, SOLUTION EPIDURAL; INFILTRATION; INTRACAUDAL; PERINEURAL at 08:28

## 2024-02-01 RX ADMIN — IOHEXOL 10 ML: 300 INJECTION, SOLUTION INTRAVENOUS at 08:32

## 2024-02-01 ASSESSMENT — PAIN - FUNCTIONAL ASSESSMENT: PAIN_FUNCTIONAL_ASSESSMENT: 0-10

## 2024-02-01 ASSESSMENT — PAIN SCALES - GENERAL
PAINLEVEL_OUTOF10: 9
PAINLEVEL_OUTOF10: 0 - NO PAIN

## 2024-02-01 ASSESSMENT — PAIN DESCRIPTION - DESCRIPTORS: DESCRIPTORS: ACHING;OTHER (COMMENT)

## 2024-02-01 NOTE — OP NOTE
Operation  Midline lumbar epidural steroid injection. Level performed L5-S1    Surgical indications  The patient is here today to receive an epidural steroid injection to assist with pain.    Operative report  The patient was taken to the procedure room, was placed into a prone positioning. The lumbar area was prepped and draped sterilely in the normal fashion. Under fluoroscopic guidance the epidural space was identified. The skin and subcutaneous tissue was anesthetized with 1% lidocaine. An 18-gauge Tuohy needle was introduced using the normal saline loss-of-resistance technique. Once the loss of resistance had been achieved, final positioning of the needle was confirmed with negative aspiration of heme and CSF, with the injection of contrast material showing spread in the epidural space, confirmed in AP and lateral view. Then the patient received 80 mg of Depo-Medrol diluted into normal saline preservative-free and 2 mL of 0.25% bupivacaine making total volume of 8 mL. The patient tolerated the procedure well, was taken to the recovery room, allowed to recover sufficient amount of time and then was discharged home in stable condition. The patient was advised to [facility]up with the [facility] to reassess improvement on as-needed basis.    Thank you for allowing me to participate in the care of this patient.    Ceasar Maher MD  Medical director of [facility]   8:33 AM  02/01/24

## 2024-02-01 NOTE — DISCHARGE INSTRUCTIONS
Post-injection instructions:    Your pain may not be gone immediately after the procedure--it usually takes the steroid 3-5 days to start working.   It may take several weeks for the medicine to reach its' full effect.   Pay attention to how much pain relief (what percentage compared to before the procedure) you get and for how long it lasts.     Activity: Avoid strenuous activity for 24 hours. After that return to your normal activity level.     Bandages: Remove after 24 hours     Showering/Bathing: You may shower after bandage is removed     Follow up: CALL OFFICE IN 7 DAYS [phone] LEAVE MESSAGE ABOUT THE RELIEF THAT WAS OBTAINED      Call the doctor immediately: if you notice:     Excessive bleeding from procedure site (brisk bright red bleeding from the site or bleeding that soaks the bandages or does not stop)   Severe headache  Inability to walk, leg or arm weakness or numbness that is worse after the procedure   Uncontrolled pain   New urinary or fecal incontinence   Signs of infection: Fever above 101.5F, redness, swelling, pus or drainage from the site    Epidural Injection    Why is this procedure done?  With an epidural injection, the doctor injects drugs deep into the area around your spinal cord. This is different than epidural anesthesia that is used for surgery or when a woman has a baby. Your spine is a group of bones in your back that protect the nerves in your spinal cord. Problems with your spine can cause swollen nerves in the spinal cord. This swelling leads to pain and can limit movement. In an epidural injection, the doctor may give you a drug to help with swelling and pain.  You may have an epidural injection in different parts of your back, based on where your pain is. For pain in your head or arms, you may have a cervical epidural injection. If your pain is in your upper or middle back, you may get a thoracic epidural injection. For pain in your lower back or legs, you may get a  lumbar epidural injection.    What will the results be?  The treatment may:  Lower pain  Reduce swelling in the nerves  Improve movement  What happens before the procedure?  Your doctor will take your history. Talk to the doctor about:  All the drugs you are taking. Be sure to include all prescription and over-the-counter (OTC) drugs, and herbal supplements. Tell the doctor about any drug allergy. Bring a list of drugs you take with you.  If you have high blood sugar or diabetes. Your drugs may need to be changed.  Any bleeding problems. Be sure to tell your doctor if you are taking any drugs that may cause bleeding. Some of these are warfarin, rivaroxaban, apixaban, ticagrelor, clopidogrel, ketorolac, ibuprofen, naproxen, or aspirin. Certain vitamins and herbs, such as garlic and fish oil, may also add to the risk for bleeding. You may need to stop these drugs as well. Talk to your doctor about them.  Tell the doctor if you are pregnant.  You will not be allowed to drive right away after the procedure. Ask a family member or a friend to drive you home.  What happens during the procedure?  To help the doctor make sure the drugs are being injected in the right place, your doctor may do an x-ray of your spine. Other times your doctor may do a continuous x-ray during the procedure. This is a fluoroscopy. The doctor may also use a colored dye or a contrast dye to check where to inject the drug.  You may be given a drug to help you relax. You may be given a drug to make the area of the injection numb.  The doctor will clean the skin on your back or neck. This helps prevent infection.  The doctor will put a needle through the skin toward your spine. The drug will be injected into a space near the spine.  The needle will be taken out and a bandage will be placed over the injection site.  What happens after the procedure?  Staff will check on you to make sure you are doing well. They will tell you when you can go  home.  What care is needed at home?  Relax on the day of the injection.  Do not drive or run machines for at least 12 hours afterwards.  Apply ice to the injection site. Place an ice pack or a bag of frozen peas wrapped in a towel over the painful part. Never put ice right on the skin. Do not leave the ice on more than 10 to 15 minutes at a time.  It may take a few days before you will feel the effects of the injection.  What follow-up care is needed?  Your doctor may ask you to make visits to the office to check on your progress. Be sure to keep these visits. You may also need to see a physical therapist (PT). The PT will teach you exercises to help you get back your strength and motion. Ask your doctor when you can exercise.  What problems could happen?  Bleeding  Infection (rare)  Headache  Nerve injury  If you have diabetes, your blood sugar can go up after the injection. Check with your doctor if you need more treatment for this.  Last Reviewed Date

## 2024-02-20 ENCOUNTER — APPOINTMENT (OUTPATIENT)
Dept: PRIMARY CARE | Facility: CLINIC | Age: 69
End: 2024-02-20
Payer: MEDICARE

## 2024-02-26 ENCOUNTER — OFFICE VISIT (OUTPATIENT)
Dept: PRIMARY CARE | Facility: CLINIC | Age: 69
End: 2024-02-26
Payer: MEDICARE

## 2024-02-26 VITALS
DIASTOLIC BLOOD PRESSURE: 80 MMHG | WEIGHT: 235 LBS | TEMPERATURE: 97.1 F | SYSTOLIC BLOOD PRESSURE: 124 MMHG | RESPIRATION RATE: 16 BRPM | HEIGHT: 71 IN | OXYGEN SATURATION: 97 % | BODY MASS INDEX: 32.9 KG/M2 | HEART RATE: 91 BPM

## 2024-02-26 DIAGNOSIS — Z00.00 ROUTINE GENERAL MEDICAL EXAMINATION AT HEALTH CARE FACILITY: ICD-10-CM

## 2024-02-26 DIAGNOSIS — E78.5 DYSLIPIDEMIA, GOAL LDL BELOW 130: ICD-10-CM

## 2024-02-26 DIAGNOSIS — Z12.5 PROSTATE CANCER SCREENING: ICD-10-CM

## 2024-02-26 DIAGNOSIS — N20.0 URIC ACID KIDNEY STONE: ICD-10-CM

## 2024-02-26 DIAGNOSIS — Z00.00 MEDICARE ANNUAL WELLNESS VISIT, SUBSEQUENT: Primary | ICD-10-CM

## 2024-02-26 PROCEDURE — 1125F AMNT PAIN NOTED PAIN PRSNT: CPT | Performed by: FAMILY MEDICINE

## 2024-02-26 PROCEDURE — 1170F FXNL STATUS ASSESSED: CPT | Performed by: FAMILY MEDICINE

## 2024-02-26 PROCEDURE — 3079F DIAST BP 80-89 MM HG: CPT | Performed by: FAMILY MEDICINE

## 2024-02-26 PROCEDURE — 99212 OFFICE O/P EST SF 10 MIN: CPT | Performed by: FAMILY MEDICINE

## 2024-02-26 PROCEDURE — 1160F RVW MEDS BY RX/DR IN RCRD: CPT | Performed by: FAMILY MEDICINE

## 2024-02-26 PROCEDURE — G0439 PPPS, SUBSEQ VISIT: HCPCS | Performed by: FAMILY MEDICINE

## 2024-02-26 PROCEDURE — 1036F TOBACCO NON-USER: CPT | Performed by: FAMILY MEDICINE

## 2024-02-26 PROCEDURE — 1159F MED LIST DOCD IN RCRD: CPT | Performed by: FAMILY MEDICINE

## 2024-02-26 PROCEDURE — 3074F SYST BP LT 130 MM HG: CPT | Performed by: FAMILY MEDICINE

## 2024-02-26 ASSESSMENT — ACTIVITIES OF DAILY LIVING (ADL)
DOING_HOUSEWORK: INDEPENDENT
DRESSING: INDEPENDENT
BATHING: INDEPENDENT
GROCERY_SHOPPING: INDEPENDENT
TAKING_MEDICATION: INDEPENDENT
MANAGING_FINANCES: INDEPENDENT

## 2024-02-26 ASSESSMENT — ENCOUNTER SYMPTOMS
LOSS OF SENSATION IN FEET: 0
DEPRESSION: 0
OCCASIONAL FEELINGS OF UNSTEADINESS: 0

## 2024-02-26 ASSESSMENT — PATIENT HEALTH QUESTIONNAIRE - PHQ9
SUM OF ALL RESPONSES TO PHQ9 QUESTIONS 1 AND 2: 0
2. FEELING DOWN, DEPRESSED OR HOPELESS: NOT AT ALL
1. LITTLE INTEREST OR PLEASURE IN DOING THINGS: NOT AT ALL

## 2024-02-26 NOTE — PROGRESS NOTES
Subjective   Reason for Visit: John Garvin is an 69 y.o. male here for a Medicare Wellness visit.          Reviewed all medications by prescribing practitioner or clinical pharmacist (such as prescriptions, OTCs, herbal therapies and supplements) and documented in the medical record.    HPI  Pleasant 69-year-old gentleman here for Medicare wellness review.  Patient recently had epidural injection by Dr. pretty for L5-S1 arthritis and disc disease.  Also had earlier last year right knee meniscectomy.  Does try to improve his low-fat diet with minimally elevated LDL last year and also is due for health maintenance late winter with PSA LDL and chemistry.  Otherwise remains on his allopurinol and hydrochlorothiazide for uric acid kidney stones.  Done very well denies any chest pain short of breath palpitations or new GI- issues.  Does due for colonoscopy in 1 year otherwise no GI red flags  PSAs been excellent  Reviewed immunizations shingles up-to-date Pneumovax up-to-date.  Patient Care Team:  Alexander Garza DO as PCP - General  Alexander Garza DO as PCP - [facility] Attributed Provider     Review of Systems   Constitutional:  Negative for fatigue and fever.   Eyes:  Negative for visual disturbance.   Respiratory:  Negative for cough, chest tightness and shortness of breath.    Cardiovascular:  Negative for chest pain, palpitations and leg swelling.   Gastrointestinal:  Negative for abdominal pain and blood in stool.   Genitourinary:  Negative for dysuria, frequency and hematuria.   Musculoskeletal:  Positive for arthralgias, back pain and myalgias. Negative for gait problem.   Skin:  Negative for rash.   Neurological:  Negative for weakness and headaches.   Psychiatric/Behavioral:  Negative for behavioral problems and sleep disturbance.        Objective   Vitals:  /80 (BP Location: Right arm, Patient Position: Sitting, BP Cuff Size: Large adult)   Pulse 91   Temp 36.2 °C (97.1 °F) (Temporal)   Resp 16   Ht  "1.801 m (5' 10.91\")   Wt 107 kg (235 lb)   SpO2 97%   BMI 32.86 kg/m²     Prostate Specific Antigen, Screen  Order: [id number]  Status: Final result       Visible to patient: Yes (not seen)    0 Result Notes       Component  Ref Range & Units 1 yr ago 2 yr ago   Prostate Specific Antigen,Screen  0.00 - 4.00 ng/mL 2.15 1.60 CM   Comment: The FDA requires that the method used for PSA assay be  reported to the physician. Values obtained with different  assay methods must not be used interchangeably. This test  was performed at [facility] using the Access  [employer] PSA assay is a two-site immunoenzymatic sandwich  assay.       Comprehensive Metabolic Panel  Order: [id number]  Status: Final result       Visible to patient: Yes (seen)    0 Result Notes              Component  Ref Range & Units 6 mo ago  (8/24/23) 1 yr ago  (2/14/23) 1 yr ago  (1/5/23) 2 yr ago  (1/28/22) 3 yr ago  (11/9/20) 3 yr ago  (10/20/20) 4 yr ago  (10/29/19)   Glucose  74 - 99 mg/dL 82 94 87 98 89 89 92   Sodium  136 - 145 mmol/L 138 140 138 140 141 142 141   Potassium  3.5 - 5.3 mmol/L 4.1 4.2 3.7 4.4 4.2 4.2 4.5   Chloride  98 - 107 mmol/L 101 104 104 103 104 105 103   Bicarbonate  21 - 32 mmol/L 29 28 26 31 30 29 30   Anion Gap  10 - 20 mmol/L 12 12 12 10 11 12 13   Urea Nitrogen  6 - 23 mg/dL 20 22 16 16 18 19 20   Creatinine  0.50 - 1.30 mg/dL 0.90 1.03 0.87 0.97 1.02 1.04 0.94   GFR MALE  >90 mL/min/1.73m2 >90 79 CM >90 CM 85 CM      Comment:  CALCULATIONS OF ESTIMATED GFR ARE PERFORMED   USING THE 2021 CKD-EPI STUDY REFIT EQUATION   WITHOUT THE RACE VARIABLE FOR THE IDMS-TRACEABLE   CREATININE METHODS.    https://jasn.asnjournals.org/content/early/2021/09/22/ASN.1350020776   Calcium  8.6 - 10.3 mg/dL 10.0 10.2 9.5 9.4 9.8 9.5 9.5 R   Albumin  3.4 - 5.0 g/dL 4.7  4.1 4.3  4.2 4.3   Alkaline Phosphatase  33 - 136 U/L 72  71 73  76 89   Total Protein  6.4 - 8.2 g/dL 7.9  7.9 7.7  7.2 6.9   AST  9 - 39 U/L 20  22 24  21 19   Total " Bilirubin  0.0 - 1.2 mg/dL 1.2  0.9 1.1  0.8 0.6   ALT (SGPT)  10 - 52 U/L 23            ntains abnormal data Cholesterol, LDL Direct  Order: [id number]  Status: Final result       Visible to patient: Yes (not seen)    0 Result Notes       1  Topic        Component  Ref Range & Units 1 yr ago 2 yr ago 4 yr ago   LDL Direct  0 - 129 mg/dL 147 High  148 High   High  CM   Comment: Elevated levels of LDL cholesterol are recognized as a key  factor in the development of atherosclerosis and CHD. The  direct LDL cholesterol test can be used to assess        Physical Exam  Vital signs reviewed and normal  Neck neck is supple without mass or adenopathy  Chest chest is clear anteriorly and posteriorly  Cardiovascular-RSR without murmurs gallop or ectopy  Assessment/Plan   Problem List Items Addressed This Visit    None  Patient will continue follow-up with his pain clinic physician as well as orthopedist  Continue good allopurinol dose as well as thiazide to prevent uric acid kidney stones  Increasing liquids and continue health routines in addition to low-fat diet to improve his LDL  Follow-up in about 5 to 6 months with previsit LDL BMP and PSA all question addressed continue health routines  @discharge  The above diagnosis and treatment plan was discussed with the patient patient will continue appropriate diet and exercise as reviewed  Patient will recheck earlier if any interval problems of significance or clinical worsening of the above problems.  Agrees above surveillance.  All question were addressed regarding above meds

## 2024-02-27 ENCOUNTER — APPOINTMENT (OUTPATIENT)
Dept: PRIMARY CARE | Facility: CLINIC | Age: 69
End: 2024-02-27
Payer: MEDICARE

## 2024-02-29 PROBLEM — N20.0 URIC ACID KIDNEY STONE: Status: ACTIVE | Noted: 2024-02-29

## 2024-02-29 PROBLEM — Z12.5 PROSTATE CANCER SCREENING: Status: ACTIVE | Noted: 2024-02-29

## 2024-02-29 PROBLEM — Z00.00 MEDICARE ANNUAL WELLNESS VISIT, SUBSEQUENT: Status: ACTIVE | Noted: 2024-02-29

## 2024-02-29 PROBLEM — E78.5 DYSLIPIDEMIA, GOAL LDL BELOW 130: Status: ACTIVE | Noted: 2024-02-29

## 2024-02-29 PROBLEM — I10 PRIMARY HYPERTENSION: Status: ACTIVE | Noted: 2024-02-29

## 2024-02-29 ASSESSMENT — ENCOUNTER SYMPTOMS
BLOOD IN STOOL: 0
PALPITATIONS: 0
CHEST TIGHTNESS: 0
HEADACHES: 0
DYSURIA: 0
FATIGUE: 0
WEAKNESS: 0
ARTHRALGIAS: 1
FREQUENCY: 0
BACK PAIN: 1
HEMATURIA: 0
MYALGIAS: 1
FEVER: 0
ABDOMINAL PAIN: 0
COUGH: 0
SHORTNESS OF BREATH: 0
SLEEP DISTURBANCE: 0

## 2024-03-28 ENCOUNTER — OFFICE VISIT (OUTPATIENT)
Dept: ORTHOPEDIC SURGERY | Facility: CLINIC | Age: 69
End: 2024-03-28
Payer: MEDICARE

## 2024-03-28 DIAGNOSIS — M17.11 PRIMARY OSTEOARTHRITIS OF RIGHT KNEE: Primary | ICD-10-CM

## 2024-03-28 PROCEDURE — 1159F MED LIST DOCD IN RCRD: CPT | Performed by: ORTHOPAEDIC SURGERY

## 2024-03-28 PROCEDURE — 1160F RVW MEDS BY RX/DR IN RCRD: CPT | Performed by: ORTHOPAEDIC SURGERY

## 2024-03-28 PROCEDURE — 20610 DRAIN/INJ JOINT/BURSA W/O US: CPT | Performed by: ORTHOPAEDIC SURGERY

## 2024-03-28 PROCEDURE — 99024 POSTOP FOLLOW-UP VISIT: CPT | Performed by: ORTHOPAEDIC SURGERY

## 2024-03-28 PROCEDURE — 1036F TOBACCO NON-USER: CPT | Performed by: ORTHOPAEDIC SURGERY

## 2024-03-28 NOTE — PROGRESS NOTES
L Inj/Asp: R knee on 3/28/2024 12:39 PM  Indications: pain  Details: 21 G needle, anterolateral approach  Medications: 30 mg sodium hyaluronate, cross-linked 30 mg/3 mL  Outcome: tolerated well, no immediate complications  Procedure, treatment alternatives, risks and benefits explained, specific risks discussed. Consent was given by the patient. Immediately prior to procedure a time out was called to verify the correct patient, procedure, equipment, support staff and site/side marked as required. Patient was prepped and draped in the usual sterile fashion.

## 2024-04-15 ENCOUNTER — TELEPHONE (OUTPATIENT)
Dept: PAIN MEDICINE | Facility: CLINIC | Age: 69
End: 2024-04-15
Payer: MEDICARE

## 2024-04-15 DIAGNOSIS — M47.816 LUMBAR SPONDYLOSIS: Primary | ICD-10-CM

## 2024-04-15 NOTE — TELEPHONE ENCOUNTER
PT called requesting to have an ablation done. I do not see any prior Facets done , epidurals only  
Placed call to patient to let him know I did send  a message regarding his ablation and will call the patient back once he responds  
No

## 2024-05-02 ENCOUNTER — OFFICE VISIT (OUTPATIENT)
Dept: ORTHOPEDIC SURGERY | Facility: CLINIC | Age: 69
End: 2024-05-02
Payer: MEDICARE

## 2024-05-02 DIAGNOSIS — M17.11 PRIMARY OSTEOARTHRITIS OF RIGHT KNEE: Primary | ICD-10-CM

## 2024-05-02 PROCEDURE — 1159F MED LIST DOCD IN RCRD: CPT | Performed by: ORTHOPAEDIC SURGERY

## 2024-05-02 PROCEDURE — 99213 OFFICE O/P EST LOW 20 MIN: CPT | Performed by: ORTHOPAEDIC SURGERY

## 2024-05-02 PROCEDURE — 1160F RVW MEDS BY RX/DR IN RCRD: CPT | Performed by: ORTHOPAEDIC SURGERY

## 2024-05-02 NOTE — PROGRESS NOTES
History of Present Illness   Chief Complaint   Patient presents with    Left Knee - Follow-up     S/p gel inj 3/28/24       History of Present Illness:   He had a gel injection with gave some relief. Would like a cortisone injection today. He notes that he is getting a back ablation soon. He has been doing his exercises but states that he has been working too much.     Imaging:  Radiographs Knee: No acute fractures or dislocations.  Mild to moderate medial compartment arthritis    Assessment:   Right knee pain with arthritis flare    Plan:  We reviewed the role of imaging, physical therapy, injections and the time frame to healing and correlation with outcome.    Cortisone injection right knee  Ice: 60 minutes on and off  Exercise home program: Medically directed knee therapy / Handout given  Follow-up as needed  L Inj/Asp: R knee on 5/3/2024 10:13 AM  Indications: pain  Details: 22 G needle, anterolateral approach  Medications: 8 mL lidocaine 10 mg/mL (1 %); 40 mg triamcinolone acetonide 40 mg/mL  Outcome: tolerated well, no immediate complications  Procedure, treatment alternatives, risks and benefits explained, specific risks discussed. Consent was given by the patient. Immediately prior to procedure a time out was called to verify the correct patient, procedure, equipment, support staff and site/side marked as required. Patient was prepped and draped in the usual sterile fashion.             Physical Exam:  Well-nourished, well-developed. No acute distress. Alert and oriented x3. Responds appropriately to questioning. Good mood. Normal affect.  Physical Exam  Right Knee:  ROM: Full motion  Positive Jcarlos´s test/PositiveApley Grind  Neurovascular exam normal distally  Palpable pedal pulse and good cap refill  Tenderness along medial joint line    Review of Systems:  GENERAL: Negative for malaise, significant weight loss, fever  MUSCULOSKELETAL: See HPI  NEURO:  Negative     Past Medical History:   Diagnosis  Date    Acute maxillary sinusitis, unspecified 11/27/2019    Acute non-recurrent maxillary sinusitis    Calculus of kidney 03/14/2019    Recurrent kidney stones    Calculus of kidney 03/10/2020    Uric acid kidney stone    Cervicogenic headache 12/22/2020    Headache, cervicogenic    Dorsalgia, unspecified     Upper back pain    Epigastric pain 05/29/2020    Abdominal wall pain in epigastric region    Ganglion, right hand 01/04/2023    Ganglion, finger of right hand    Insect bite (nonvenomous) of other part of head, initial encounter 09/08/2021    Insect bite of cheek, initial encounter    Pain due to genitourinary prosthetic devices, implants and grafts, initial encounter (CMS-HCC)     Pain due to ureteral stent    Personal history of other diseases of the musculoskeletal system and connective tissue     History of back pain    Personal history of other diseases of urinary system 03/10/2020    History of hematuria    Personal history of other specified conditions 08/20/2021    History of chronic fatigue    Personal history of other specified conditions 02/19/2020    History of left flank pain    Personal history of urinary calculi 06/25/2020    History of renal calculi    Personal history of urinary calculi     History of kidney stones       Medication Documentation Review Audit       Reviewed by Melissa Brunner, MA (Medical Assistant) on 05/02/24 at 0926      Medication Order Taking? Sig Documenting Provider Last Dose Status   acetaminophen (TYLENOL ORAL) [id number] No Take by mouth. CAPS Historical Provider, MD Taking Active   acetylcysteine 600 mg capsule [id number] No Take by mouth. Historical Provider, MD Taking Active   allopurinol (Zyloprim) 300 mg tablet [id number] No Take 0.5 tablets (150 mg) by mouth once daily. Alexander Garza,  Taking Active   aspirin 81 mg EC tablet [id number] No Take 1 tablet (81 mg) by mouth once daily. Historical Provider, MD Taking Active   cartilage-collagen-bor-hyalur 40-5-3.3 mg  tablet [id number] No Take 1 tablet by mouth 1 (one) time each day. Historical Provider, MD Taking Active   cetirizine (ZyrTEC) 10 mg tablet [id number] No Take 1 tablet (10 mg) by mouth once daily. Matthew Romano MD Taking Active   cholecalciferol (Vitamin D-3) 50 mcg (2,000 unit) capsule [id number] No Take 1 capsule (50 mcg) by mouth once daily. Historical Provider, MD Taking Active   DIETARY SUPPLEMENT ORAL [id number] No Take by mouth. GRICELDA KHAN Historical Provider, MD Taking Active   fish oil concentrate (Omega-3) 120-180 mg capsule [id number] No Take 1 tablet by mouth 1 (one) time each day. Historical Provider, MD Taking Active   fluticasone (Flonase) 50 mcg/actuation nasal spray [id number] No Administer 1 spray into each nostril once daily. Historical Provider, MD Taking Active   hyalur ac/chond sul/colg II/AA (HYALURONIC ACID, CHOND-COLLGN, ORAL) [id number] No Take by mouth. Historical MD Rosalina Taking Active   hydroCHLOROthiazide (HYDRODiuril) 12.5 mg tablet [id number] No Take 1 tablet (12.5 mg) by mouth once daily. Alexander Garza DO Taking Active   ibuprofen (Motrin) capsule [id number] No Take by mouth. Historical MD Rosalina Taking Active   melatonin 3 mg tablet [id number] No Take 1 tablet (3 mg) by mouth once daily at bedtime. Matthew Romano MD Taking Active   multivitamin tablet [id number] No Take 1 tablet by mouth once daily. Historical MD Rosalina Taking Active   potassium citrate CR (Urocit-K-15) 15 mEq ER tablet [id number] No Take 1 tablet (15 mEq) by mouth once daily. Historical MD Rosalina Taking Active   tadalafil (Cialis) 20 mg tablet [id number] No Take 1 tablet (20 mg) by mouth once daily as needed for erectile dysfunction. Alexander Garza DO Taking Active   tiZANidine (Zanaflex) 4 mg tablet [id number]  Take 1 tablet (4 mg) by mouth 3 times a day as needed for muscle spasms. Alexander Garza DO   [id number]                    Allergies   Allergen Reactions    Adhesive Hives     hives for one  week. :    Mold Unknown    Morphine Hives     Very Severe Reaction: Swelling    Yeast Other       Social History     Socioeconomic History    Marital status:      Spouse name: Not on file    Number of children: Not on file    Years of education: Not on file    Highest education level: Not on file   Occupational History    Not on file   Tobacco Use    Smoking status: Never     Passive exposure: Past    Smokeless tobacco: Never   Substance and Sexual Activity    Alcohol use: Not Currently    Drug use: Never    Sexual activity: Not on file   Other Topics Concern    Not on file   Social History Narrative    Not on file     Social Determinants of Health     Financial Resource Strain: Not on file   Food Insecurity: Not on file   Transportation Needs: Not on file   Physical Activity: Not on file   Stress: Not on file   Social Connections: Not on file   Intimate Partner Violence: Not on file   Housing Stability: Not on file       Past Surgical History:   Procedure Laterality Date    OTHER SURGICAL HISTORY  03/14/2019    Lithotripsy    OTHER SURGICAL HISTORY  03/14/2019    Nasal septoplasty    OTHER SURGICAL HISTORY  12/14/2022    Ganglion cyst excision    OTHER SURGICAL HISTORY  06/30/2022    Percutaneous nephrolithotomy    OTHER SURGICAL HISTORY  07/16/2021    Kidney surgery    OTHER SURGICAL HISTORY  05/20/2019    Partial atrial septal defect repair    OTHER SURGICAL HISTORY  05/20/2019    Lithotripsy       No results found.

## 2024-05-03 PROCEDURE — 20610 DRAIN/INJ JOINT/BURSA W/O US: CPT | Performed by: ORTHOPAEDIC SURGERY

## 2024-05-03 RX ORDER — LIDOCAINE HYDROCHLORIDE 10 MG/ML
8 INJECTION INFILTRATION; PERINEURAL
Status: COMPLETED | OUTPATIENT
Start: 2024-05-03 | End: 2024-05-03

## 2024-05-03 RX ORDER — TRIAMCINOLONE ACETONIDE 40 MG/ML
40 INJECTION, SUSPENSION INTRA-ARTICULAR; INTRAMUSCULAR
Status: COMPLETED | OUTPATIENT
Start: 2024-05-03 | End: 2024-05-03

## 2024-05-03 RX ADMIN — LIDOCAINE HYDROCHLORIDE 8 ML: 10 INJECTION INFILTRATION; PERINEURAL at 10:13

## 2024-05-03 RX ADMIN — TRIAMCINOLONE ACETONIDE 40 MG: 40 INJECTION, SUSPENSION INTRA-ARTICULAR; INTRAMUSCULAR at 10:13

## 2024-05-13 ENCOUNTER — TELEPHONE (OUTPATIENT)
Dept: PAIN MEDICINE | Facility: CLINIC | Age: 69
End: 2024-05-13
Payer: MEDICARE

## 2024-05-13 DIAGNOSIS — M47.816 LUMBAR SPONDYLOSIS: Primary | ICD-10-CM

## 2024-05-13 NOTE — TELEPHONE ENCOUNTER
Per insurance, patient last had imaging done in 2019 and needs an x-ray for his injection to be approved. Can you put the order in please?

## 2024-05-13 NOTE — TELEPHONE ENCOUNTER
Called patient to let him know we need to have him come in for a fuv and have an x-ray done in order for his Inj to be approved. Pt is scheduled on 5/21

## 2024-05-14 ENCOUNTER — HOSPITAL ENCOUNTER (OUTPATIENT)
Dept: RADIOLOGY | Facility: CLINIC | Age: 69
Discharge: HOME | End: 2024-05-14
Payer: MEDICARE

## 2024-05-14 DIAGNOSIS — M47.816 LUMBAR SPONDYLOSIS: ICD-10-CM

## 2024-05-14 PROCEDURE — 72100 X-RAY EXAM L-S SPINE 2/3 VWS: CPT | Performed by: RADIOLOGY

## 2024-05-14 PROCEDURE — 72100 X-RAY EXAM L-S SPINE 2/3 VWS: CPT

## 2024-05-16 ENCOUNTER — OFFICE VISIT (OUTPATIENT)
Dept: PAIN MEDICINE | Facility: CLINIC | Age: 69
End: 2024-05-16
Payer: MEDICARE

## 2024-05-16 VITALS
RESPIRATION RATE: 18 BRPM | TEMPERATURE: 97.2 F | OXYGEN SATURATION: 97 % | SYSTOLIC BLOOD PRESSURE: 155 MMHG | DIASTOLIC BLOOD PRESSURE: 80 MMHG | HEART RATE: 76 BPM

## 2024-05-16 DIAGNOSIS — M47.816 LUMBAR SPONDYLOSIS: Primary | ICD-10-CM

## 2024-05-16 PROCEDURE — 99214 OFFICE O/P EST MOD 30 MIN: CPT | Performed by: NURSE PRACTITIONER

## 2024-05-16 NOTE — H&P (VIEW-ONLY)
History Of Present Illness  John Garvin is a 69 y.o. male presenting with chronic back pain. He is known in this clinic because of chronic back pain, he has procedures in the past to help with this pain in this clinic. Today the pain is located in his low back more on the right side and it goes down to the right lower extremity. The level of pain at this time is about 8/10, and pain goes up with certain activity or movement, this has been going on for about 3 to 4 weeks.  He denies any recent injury or falls.  He was prescribed Percocet, Tramadol in the past for this pain.  On 6/15/2023 he had an RFA bilateral of L3-L4, L4-L5 and he confirms that he had a good positive response from the procedure, states of about 75 to 80% reduction in pain that lasted for about 5 to 6 weeks. He states today that this back pain is affecting his day to day activity.  His Oswestry score is 26, severe disability level.  OARRS obtained and reviewed, no abuse or misuse with prescribed medication noted.       Past Medical History  Past Medical History:   Diagnosis Date    Acute maxillary sinusitis, unspecified 11/27/2019    Acute non-recurrent maxillary sinusitis    Calculus of kidney 03/14/2019    Recurrent kidney stones    Calculus of kidney 03/10/2020    Uric acid kidney stone    Cervicogenic headache 12/22/2020    Headache, cervicogenic    Dorsalgia, unspecified     Upper back pain    Epigastric pain 05/29/2020    Abdominal wall pain in epigastric region    Ganglion, right hand 01/04/2023    Ganglion, finger of right hand    Insect bite (nonvenomous) of other part of head, initial encounter 09/08/2021    Insect bite of cheek, initial encounter    Pain due to genitourinary prosthetic devices, implants and grafts, initial encounter (CMS-HCC)     Pain due to ureteral stent    Personal history of other diseases of the musculoskeletal system and connective tissue     History of back pain    Personal history of other diseases of urinary  system 03/10/2020    History of hematuria    Personal history of other specified conditions 08/20/2021    History of chronic fatigue    Personal history of other specified conditions 02/19/2020    History of left flank pain    Personal history of urinary calculi 06/25/2020    History of renal calculi    Personal history of urinary calculi     History of kidney stones       Surgical History  Past Surgical History:   Procedure Laterality Date    OTHER SURGICAL HISTORY  03/14/2019    Lithotripsy    OTHER SURGICAL HISTORY  03/14/2019    Nasal septoplasty    OTHER SURGICAL HISTORY  12/14/2022    Ganglion cyst excision    OTHER SURGICAL HISTORY  06/30/2022    Percutaneous nephrolithotomy    OTHER SURGICAL HISTORY  07/16/2021    Kidney surgery    OTHER SURGICAL HISTORY  05/20/2019    Partial atrial septal defect repair    OTHER SURGICAL HISTORY  05/20/2019    Lithotripsy        Social History  He reports that he has never smoked. He has been exposed to tobacco smoke. He has never used smokeless tobacco. He reports that he does not currently use alcohol. He reports that he does not use drugs.    Family History  Family History   Problem Relation Name Age of Onset    Other (cardiac disorder) Mother      Lung cancer Father      Breast cancer Sister      Other (cardiac disorder) Brother          Allergies  Adhesive, Mold, Morphine, and Yeast    Review of Systems    Review of systems x 10 is negative.   No recent injury or falls reported.   No recent change in medical condition reported.   No recent weakness reported.   Still able to control bowel and bladder function.  Denies any problem with constipation.   Denies fever, cough, shortness of breath recently.   No interval change with medication/health issues reported.  Denies opioids diversion and abuse. Denies overuse of pain medications.    Physical Exam   Awake,alert, no acute distress, appropriate.  Spine is of normal curvature.  Full ROM on all 4 extremities, sensation and  motor intact, no vascular compromise.  No pedal edema, normal gait.  Skin warm, dry, intact, turgor is normal.  Denies any numbness, tingling.  Radiating pain to the RLE with certain movements.  Last Recorded Vitals  Blood pressure 155/80, pulse 76, temperature 36.2 °C (97.2 °F), resp. rate 18, SpO2 97%.    Relevant Results  Xray of the lumbar spine supposed to have done on 5/13/2024 or 5/14/2024,  Results were not ready. I have called [facility] and was told results will be ready in about 72 hours.       Assessment/Plan   .Discussed about RFA procedure for his back, he agrees to have it done.  He will be scheduled for an RFA bilateral L3-L4, and L4-L5 under fluoroscopic guidance for control of back pain.  His Oswestry score is 26, severe disability level.  Explained plan to this patient, and patient verbalized understanding and agreement with the plan. If there is questions or concerns, please feel free to contact me to clarify at [phone], M-F 8-4 PM.      Chronic back pain associated with lumbago, lumbar spondylosis.         I spent 45 minutes in the professional and overall care of this patient.      Ele Meza, APRN-CNP

## 2024-05-16 NOTE — H&P
History Of Present Illness  John Garvin is a 69 y.o. male presenting with chronic back pain. He is known in this clinic because of chronic back pain, he has procedures in the past to help with this pain in this clinic. Today the pain is located in his low back more on the right side and it goes down to the right lower extremity. The level of pain at this time is about 8/10, and pain goes up with certain activity or movement, this has been going on for about 3 to 4 weeks.  He denies any recent injury or falls.  He was prescribed Percocet, Tramadol in the past for this pain.  On 6/15/2023 he had an RFA bilateral of L3-L4, L4-L5 and he confirms that he had a good positive response from the procedure, states of about 75 to 80% reduction in pain that lasted for about 5 to 6 weeks. He states today that this back pain is affecting his day to day activity.  His Oswestry score is 26, severe disability level.  OARRS obtained and reviewed, no abuse or misuse with prescribed medication noted.       Past Medical History  Past Medical History:   Diagnosis Date    Acute maxillary sinusitis, unspecified 11/27/2019    Acute non-recurrent maxillary sinusitis    Calculus of kidney 03/14/2019    Recurrent kidney stones    Calculus of kidney 03/10/2020    Uric acid kidney stone    Cervicogenic headache 12/22/2020    Headache, cervicogenic    Dorsalgia, unspecified     Upper back pain    Epigastric pain 05/29/2020    Abdominal wall pain in epigastric region    Ganglion, right hand 01/04/2023    Ganglion, finger of right hand    Insect bite (nonvenomous) of other part of head, initial encounter 09/08/2021    Insect bite of cheek, initial encounter    Pain due to genitourinary prosthetic devices, implants and grafts, initial encounter (CMS-HCC)     Pain due to ureteral stent    Personal history of other diseases of the musculoskeletal system and connective tissue     History of back pain    Personal history of other diseases of urinary  system 03/10/2020    History of hematuria    Personal history of other specified conditions 08/20/2021    History of chronic fatigue    Personal history of other specified conditions 02/19/2020    History of left flank pain    Personal history of urinary calculi 06/25/2020    History of renal calculi    Personal history of urinary calculi     History of kidney stones       Surgical History  Past Surgical History:   Procedure Laterality Date    OTHER SURGICAL HISTORY  03/14/2019    Lithotripsy    OTHER SURGICAL HISTORY  03/14/2019    Nasal septoplasty    OTHER SURGICAL HISTORY  12/14/2022    Ganglion cyst excision    OTHER SURGICAL HISTORY  06/30/2022    Percutaneous nephrolithotomy    OTHER SURGICAL HISTORY  07/16/2021    Kidney surgery    OTHER SURGICAL HISTORY  05/20/2019    Partial atrial septal defect repair    OTHER SURGICAL HISTORY  05/20/2019    Lithotripsy        Social History  He reports that he has never smoked. He has been exposed to tobacco smoke. He has never used smokeless tobacco. He reports that he does not currently use alcohol. He reports that he does not use drugs.    Family History  Family History   Problem Relation Name Age of Onset    Other (cardiac disorder) Mother      Lung cancer Father      Breast cancer Sister      Other (cardiac disorder) Brother          Allergies  Adhesive, Mold, Morphine, and Yeast    Review of Systems    Review of systems x 10 is negative.   No recent injury or falls reported.   No recent change in medical condition reported.   No recent weakness reported.   Still able to control bowel and bladder function.  Denies any problem with constipation.   Denies fever, cough, shortness of breath recently.   No interval change with medication/health issues reported.  Denies opioids diversion and abuse. Denies overuse of pain medications.    Physical Exam   Awake,alert, no acute distress, appropriate.  Spine is of normal curvature.  Full ROM on all 4 extremities, sensation and  motor intact, no vascular compromise.  No pedal edema, normal gait.  Skin warm, dry, intact, turgor is normal.  Denies any numbness, tingling.  Radiating pain to the RLE with certain movements.  Last Recorded Vitals  Blood pressure 155/80, pulse 76, temperature 36.2 °C (97.2 °F), resp. rate 18, SpO2 97%.    Relevant Results  Xray of the lumbar spine supposed to have done on 5/13/2024 or 5/14/2024,  Results were not ready. I have called [facility] and was told results will be ready in about 72 hours.       Assessment/Plan   .Discussed about RFA procedure for his back, he agrees to have it done.  He will be scheduled for an RFA bilateral L3-L4, and L4-L5 under fluoroscopic guidance for control of back pain.  His Oswestry score is 26, severe disability level.  Explained plan to this patient, and patient verbalized understanding and agreement with the plan. If there is questions or concerns, please feel free to contact me to clarify at [phone], M-F 8-4 PM.      Chronic back pain associated with lumbago, lumbar spondylosis.         I spent 45 minutes in the professional and overall care of this patient.      Ele Meza, APRN-CNP

## 2024-05-20 ENCOUNTER — TELEPHONE (OUTPATIENT)
Dept: PAIN MEDICINE | Facility: CLINIC | Age: 69
End: 2024-05-20
Payer: MEDICARE

## 2024-05-20 NOTE — TELEPHONE ENCOUNTER
"Placed call to John to advise him that his procedure needs to be pended.  Patient was raising his voice, combative and dismissive.  I tried explaining why this had to happen, and he kept speaking over me and saying he used to schedule appointments in a week. He said \"you aren't the one in pain\"...he asked if Ivory was available, so I advised him that I would have her call him in the morning.    "

## 2024-05-21 ENCOUNTER — APPOINTMENT (OUTPATIENT)
Dept: PAIN MEDICINE | Facility: CLINIC | Age: 69
End: 2024-05-21
Payer: MEDICARE

## 2024-05-24 ENCOUNTER — OFFICE VISIT (OUTPATIENT)
Dept: PRIMARY CARE | Facility: CLINIC | Age: 69
End: 2024-05-24
Payer: MEDICARE

## 2024-05-24 VITALS
HEIGHT: 71 IN | DIASTOLIC BLOOD PRESSURE: 70 MMHG | SYSTOLIC BLOOD PRESSURE: 118 MMHG | RESPIRATION RATE: 16 BRPM | HEART RATE: 76 BPM | BODY MASS INDEX: 31.64 KG/M2 | TEMPERATURE: 97.1 F | WEIGHT: 226 LBS | OXYGEN SATURATION: 97 %

## 2024-05-24 DIAGNOSIS — M54.9 CHRONIC MIDLINE BACK PAIN, UNSPECIFIED BACK LOCATION: ICD-10-CM

## 2024-05-24 DIAGNOSIS — G89.29 CHRONIC MIDLINE BACK PAIN, UNSPECIFIED BACK LOCATION: ICD-10-CM

## 2024-05-24 DIAGNOSIS — M43.17 SPONDYLOLISTHESIS AT L5-S1 LEVEL: Primary | ICD-10-CM

## 2024-05-24 PROCEDURE — 1159F MED LIST DOCD IN RCRD: CPT | Performed by: FAMILY MEDICINE

## 2024-05-24 PROCEDURE — 3074F SYST BP LT 130 MM HG: CPT | Performed by: FAMILY MEDICINE

## 2024-05-24 PROCEDURE — 3078F DIAST BP <80 MM HG: CPT | Performed by: FAMILY MEDICINE

## 2024-05-24 PROCEDURE — 99213 OFFICE O/P EST LOW 20 MIN: CPT | Performed by: FAMILY MEDICINE

## 2024-05-24 PROCEDURE — 1160F RVW MEDS BY RX/DR IN RCRD: CPT | Performed by: FAMILY MEDICINE

## 2024-05-24 RX ORDER — TIZANIDINE 4 MG/1
4 TABLET ORAL 3 TIMES DAILY PRN
Qty: 270 TABLET | Refills: 3 | Status: SHIPPED | OUTPATIENT
Start: 2024-05-24 | End: 2025-05-19

## 2024-05-24 NOTE — PROGRESS NOTES
Subjective   Patient ID: John Garvin is a 69 y.o. male who presents for Back Pain (Patient would like a referral for back doctor. ).  HPI  He is here to get a formal referral to a pain physician downtown to review his low back anterolisthesis L4-5 is also chronic lumbar radiculopathy with diffuse degenerative arthritis.  Is been followed by pain clinic and scheduled for possible lumbar ablation.  He also followed for knee arthritis by Dr. Aviles and orthopedics  The patient's had much more pain in the last 3 to 4 months and would like a second opinion downtown and this was formalized at his request  I did order LDL BMP PSA last February he needs to get this drawn  Otherwise no recent dysuria or hematuria has history of 5 cm benign-appearing right kidney cyst and multiple small stones in the left kidney otherwise no worrisome neoplasm seen  The patient has no striking weakness lower extremities but ongoing pain with prolonged walking of only about 5 to 15 minutes.  No abdominal pain rating to the back and no back pain rating to the abdomen no  or GI red flags  Tries to remain as active as he can but without any new chest pain shortness of breath or palpitations.  150 mg of Zyloprim for gout prevention 1 baby aspirin a day Zyrtec at nighttime omega-3 fatty acid and Tylenol if needed also takes hydrochlorothiazide 12.5 mg daily uric acid kidney stones, Motrin and Zanaflex as needed for tightness in the back.  Chronic meds reviewed no reported side effects.  Review of Systems   Constitutional:  Negative for fatigue, fever and unexpected weight change.   Eyes:  Negative for visual disturbance.   Respiratory:  Negative for cough, chest tightness and shortness of breath.    Cardiovascular:  Negative for chest pain, palpitations and leg swelling.   Gastrointestinal:  Negative for abdominal pain and blood in stool.   Genitourinary:  Positive for frequency. Negative for dysuria, flank pain, hematuria and testicular pain.  "  Musculoskeletal:  Positive for arthralgias, back pain and myalgias.   Skin:  Negative for rash.   Neurological:  Positive for numbness. Negative for weakness, light-headedness and headaches.   Psychiatric/Behavioral:  Negative for behavioral problems, confusion, sleep disturbance and suicidal ideas.        Objective   /70 (BP Location: Right arm, Patient Position: Sitting, BP Cuff Size: Large adult)   Pulse 76   Temp 36.2 °C (97.1 °F) (Temporal)   Resp 16   Ht 1.801 m (5' 10.91\")   Wt 103 kg (226 lb)   SpO2 97%   BMI 31.60 kg/m²       state Specific Antigen, Screen  Order: [id number]   Status: Final result       Visible to patient: Yes (not seen)    0 Result Notes       Component  Ref Range & Units 1 yr ago 2 yr ago   Prostate Specific Antigen,Screen  0.00 - 4.00 ng/mL 2.15 1.60 CM   Comment: The FDA requires that the method used for PSA assay be      lic Panel  Order: [id number]   Status: Final result       Visible to patient: Yes (seen)    0 Result Notes              Component  Ref Range & Units 9 mo ago  (8/24/23) 1 yr ago  (2/14/23) 1 yr ago  (1/5/23) 2 yr ago  (1/28/22) 3 yr ago  (11/9/20) 3 yr ago  (10/20/20) 4 yr ago  (10/29/19)   Glucose  74 - 99 mg/dL 82 94 87 98 89 89 92   Sodium  136 - 145 mmol/L 138 140 138 140 141 142 141   Potassium  3.5 - 5.3 mmol/L 4.1 4.2 3.7 4.4 4.2 4.2 4.5   Chloride  98 - 107 mmol/L 101 104 104 103 104 105 103   Bicarbonate  21 - 32 mmol/L 29 28 26 31 30 29 30   Anion Gap  10 - 20 mmol/L 12 12 12 10 11 12 13   Urea Nitrogen  6 - 23 mg/dL 20 22 16 16 18 19 20   Creatinine  0.50 - 1.30 mg/dL 0.90 1.03 0.87 0.97 1.02 1.04 0.94   GFR MALE  >90 mL/min/1.73m2 >90 79 CM >90 CM 85 CM      Comment:  CALCULATIONS OF ESTIMATED GFR ARE PERFORMED   USING THE 2021 CKD-EPI STUDY REFIT EQUATION   WITHOUT THE RACE VARIABLE FOR THE IDMS-TRACEABLE   CREATININE METHODS.    https://jasn.asnjournals.org/content/early/2021/09/22/ASN.2183271010   Calcium  8.6 - 10.3 mg/dL 10.0 10.2 9.5 9.4 " 9.8 9.5 9.5 R   Albumin  3.4 - 5.0 g/dL 4.7  4.1 4.3  4.2 4.3   Alkaline Phosphatase  33 - 136 U/L 72  71 73  76 89   Total Protein  6.4 - 8.2 g/dL 7.9  7.9 7.7  7.2 6.9   AST  9 - 39 U/L 20  22 24  21 19   Total Bilirubin  0.0 - 1.2 mg/dL 1.2  0.9 1.1  0.8 0.6   ALT (SGPT)  10 - 52 U/L 23            ential  Order: [id number]   Status: Final result       Visible to patient: Yes (seen)    0 Result Notes          Component  Ref Range & Units 9 mo ago  (8/24/23) 1 yr ago  (1/5/23) 3 yr ago  (11/21/20) 3 yr ago  (11/9/20) 3 yr ago  (10/20/20)   WBC  4.4 - 11.3 x10E9/L 9.1 6.3 10.7 6.0 4.7   nRBC  0.0 - 0.0 /100 WBC 0.0  0.0 0.0    RBC  4.50 - 5.90 x10E12/L 5.02 4.89 4.23 Low  5.10 4.78   Hemoglobin  13.5 - 17.5 g/dL 14.8 14.6 12.5 Low  15.5 14.3   Hematocrit  41.0 - 52.0 % 45.6 44.1 39.5 Low  47.9 45.3   MCV  80 - 100 fL 91 90 93 94 95   MCHC  32.0 - 36.0 g/dL 32.5 33.1 31.6 Low  32.4 31.6 Low    Platelets  150 - 450 x10E9/L 224 200 183 205 169   RDW  11.5 - 14.5 % 12.5 12.3 12.2 12.3 12.4   Neutrophils %  40.0 - 80.0 % 67.7 59.5   50.4   Immature Granulocytes %, Automated  0.0 - 0.9 % 0.4 0.3 CM   0.2 CM   Comment:  Immature Granulocyte Count (IG) includes promyelocytes,   myelocytes and metamyelocytes but does not include bands.   Percent differential counts (%) should be interpreted in the   context of the absolute cell counts (cells/L).   Lymphocytes %  13.0 - 44.0 % 21.5 26.1   31.4   Monocytes %  2.0 - 10.0 % 9.0 10.0   10.1   Eosinophils %  0.0 - 6.0 % 0.9 3.3         RISON:  None.      ACCESSION NUMBER(S):  [id number]      ORDERING CLINICIAN:  ROSALVA BARRAGAN      FINDINGS:  No acute fracture or subluxation. Grade 1 anterolisthesis L4-L5.  Multilevel marginal osteophytes in the lumbar spine. Facet  arthropathy, greatest in the lower lumbar spine. No vertebral body  height loss. No definitive disc height loss. Mild dextrocurvature of  the lumbar spine. Atherosclerosis. Multiple nodular calcifications in  the  left upper quadrant of the abdomen, the etiology of which is  unclear.      IMPRESSION:  Multilevel degenerative changes. Grade 1 anterolisthesis L4-L5.          MACRO:  None    Physical Exam  Vital sign review normal  General inspection is a pleasant or active individual in no acute distress  Musculoskeletal continued pain in the midline L2-L5 as well as right facet pain.  More the pain in the right medial SI than the left SI.  Definite pain with hyperextension and quick rotation to the right.  Otherwise DTRs lower extremities otherwise stable no hip pain laterally.  No discrete sensory deficits motor deficit the lower extremities  Neck neck is supple no mass adenopathy bruits rigidity  Pulmonary-chest clear anteriorly and posteriorly  Cardiovascular RSR without murmurs gallop or ectopy  Peripheral vascular distal pulses are intact and no symmetric or asymmetric edema  Skin-no rash petechiae or jaundice    Assessment/Plan   Problem List Items Addressed This Visit       Chronic midline back pain   Follow-up for rhizotomy this week with his pain clinic physician  Follows Dr. Aviles regarding his knee pain  I did order formal orthopedic surgery consult for second opinion downtown his request  I did order earlier lab in February to get his LDL PSA as well as BMP  Will recheck urine when seen in about 2 months as well as order ultrasound of the kidneys to follow-up for kidney stones as well as the right renal cyst.  Importance of clinical follow-up reviewed importance of getting lab drawn can reviewed  Continue the above medicines increasing liquids  Meds at the direction of orthopedics and pain management  @discharge  The above diagnosis and treatment plan was discussed with the patient patient will continue appropriate diet and exercise as reviewed  Patient will recheck earlier if any interval problems of significance or clinical worsening of the above problems.  Agrees above surveillance.  All question were addressed  regarding above meds

## 2024-05-28 ENCOUNTER — HOSPITAL ENCOUNTER (OUTPATIENT)
Dept: RADIOLOGY | Facility: HOSPITAL | Age: 69
Discharge: HOME | End: 2024-05-28
Payer: MEDICARE

## 2024-05-28 ENCOUNTER — HOSPITAL ENCOUNTER (OUTPATIENT)
Dept: PAIN MEDICINE | Facility: CLINIC | Age: 69
Discharge: HOME | End: 2024-05-28
Payer: MEDICARE

## 2024-05-28 VITALS
RESPIRATION RATE: 18 BRPM | DIASTOLIC BLOOD PRESSURE: 87 MMHG | SYSTOLIC BLOOD PRESSURE: 142 MMHG | TEMPERATURE: 96.1 F | OXYGEN SATURATION: 97 % | HEART RATE: 67 BPM

## 2024-05-28 DIAGNOSIS — M47.816 LUMBAR SPONDYLOSIS: ICD-10-CM

## 2024-05-28 PROCEDURE — 99152 MOD SED SAME PHYS/QHP 5/>YRS: CPT | Performed by: PAIN MEDICINE

## 2024-05-28 PROCEDURE — 64635 DESTROY LUMB/SAC FACET JNT: CPT | Performed by: PAIN MEDICINE

## 2024-05-28 PROCEDURE — 2500000005 HC RX 250 GENERAL PHARMACY W/O HCPCS: Performed by: PAIN MEDICINE

## 2024-05-28 PROCEDURE — 3700000012 HC SEDATION LEVEL 5+ TIME - INITIAL 15 MINUTES 5/> YEARS: Performed by: PAIN MEDICINE

## 2024-05-28 PROCEDURE — 2500000004 HC RX 250 GENERAL PHARMACY W/ HCPCS (ALT 636 FOR OP/ED): Performed by: PAIN MEDICINE

## 2024-05-28 PROCEDURE — 64636 DESTROY L/S FACET JNT ADDL: CPT | Performed by: PAIN MEDICINE

## 2024-05-28 PROCEDURE — 7100000009 HC PHASE TWO TIME - INITIAL BASE CHARGE: Performed by: PAIN MEDICINE

## 2024-05-28 PROCEDURE — 64636 DESTROY L/S FACET JNT ADDL: CPT | Mod: 50 | Performed by: PAIN MEDICINE

## 2024-05-28 PROCEDURE — 64635 DESTROY LUMB/SAC FACET JNT: CPT | Mod: 50 | Performed by: PAIN MEDICINE

## 2024-05-28 PROCEDURE — A4649 SURGICAL SUPPLIES: HCPCS | Performed by: PAIN MEDICINE

## 2024-05-28 PROCEDURE — 7100000010 HC PHASE TWO TIME - EACH INCREMENTAL 1 MINUTE: Performed by: PAIN MEDICINE

## 2024-05-28 RX ORDER — MIDAZOLAM HYDROCHLORIDE 1 MG/ML
1 INJECTION, SOLUTION INTRAMUSCULAR; INTRAVENOUS
Status: DISCONTINUED | OUTPATIENT
Start: 2024-05-28 | End: 2024-05-29 | Stop reason: HOSPADM

## 2024-05-28 RX ORDER — LIDOCAINE IN NACL,ISO-OSMOT/PF 30 MG/3 ML
10 SYRINGE (ML) INJECTION ONCE
Status: COMPLETED | OUTPATIENT
Start: 2024-05-28 | End: 2024-05-28

## 2024-05-28 RX ORDER — BUPIVACAINE HYDROCHLORIDE 5 MG/ML
10 INJECTION, SOLUTION PERINEURAL ONCE
Status: COMPLETED | OUTPATIENT
Start: 2024-05-28 | End: 2024-05-28

## 2024-05-28 RX ADMIN — BUPIVACAINE HYDROCHLORIDE 50 MG: 5 INJECTION, SOLUTION INFILTRATION; PERINEURAL at 09:01

## 2024-05-28 RX ADMIN — MIDAZOLAM 2 MG: 1 INJECTION INTRAMUSCULAR; INTRAVENOUS at 09:02

## 2024-05-28 RX ADMIN — Medication 100 MG: at 09:02

## 2024-05-28 ASSESSMENT — ENCOUNTER SYMPTOMS
BACK PAIN: 1
CHEST TIGHTNESS: 0
MYALGIAS: 1
FLANK PAIN: 0
SHORTNESS OF BREATH: 0
ABDOMINAL PAIN: 0
COUGH: 0
FATIGUE: 0
WEAKNESS: 0
LIGHT-HEADEDNESS: 0
PALPITATIONS: 0
FEVER: 0
BLOOD IN STOOL: 0
FREQUENCY: 1
ARTHRALGIAS: 1
UNEXPECTED WEIGHT CHANGE: 0
NUMBNESS: 1
CONFUSION: 0
SLEEP DISTURBANCE: 0
HEADACHES: 0
HEMATURIA: 0
DYSURIA: 0

## 2024-05-28 ASSESSMENT — COLUMBIA-SUICIDE SEVERITY RATING SCALE - C-SSRS
1. IN THE PAST MONTH, HAVE YOU WISHED YOU WERE DEAD OR WISHED YOU COULD GO TO SLEEP AND NOT WAKE UP?: NO
2. HAVE YOU ACTUALLY HAD ANY THOUGHTS OF KILLING YOURSELF?: NO
6. HAVE YOU EVER DONE ANYTHING, STARTED TO DO ANYTHING, OR PREPARED TO DO ANYTHING TO END YOUR LIFE?: NO

## 2024-05-28 ASSESSMENT — PAIN SCALES - GENERAL: PAINLEVEL_OUTOF10: 9

## 2024-05-28 ASSESSMENT — PAIN - FUNCTIONAL ASSESSMENT: PAIN_FUNCTIONAL_ASSESSMENT: 0-10

## 2024-05-28 NOTE — DISCHARGE INSTRUCTIONS
Post-injection instructions FOR Radiofrequency Ablation    Your radiofrequency ablation was completed today is not unusual to have some exacerbation of the pain before you get better.  Radiofrequency ablation takes an average of 2 to 3 weeks before it completely starts showing full results      Activity:  RETURN TO NORMAL ACTIVITY once the sedation is completely worn off do not sign any legal document for the first 24 hours do not drive or operate machinery for the first 24 hours until the sedation effect is completely worn off    Bandages: Remove after 24 hours     Showering/Bathing: You may shower after bandage is removed     May use ice at the site of the injection for the first 24 hours      Call the OFFICE immediately: if you notice:     Excessive bleeding from procedure site (brisk bright red bleeding from the site or bleeding that soaks the bandages or does not stop)   Severe headache  Inability to walk, leg or arm weakness or numbness that is worse after the procedure   Uncontrolled pain   New urinary or fecal incontinence   Signs of infection: Fever above 101.5F, redness, swelling, pus or drainage from the site    Please contact the pain clinic at [phone]  in 3 weeks to report results or with any further questions or concerns

## 2024-05-28 NOTE — OP NOTE
Operation  Midline lumbar epidural steroid injection. Level performed bilateral L3-L4 L4-L5  Versed 2 mg     Surgical indications  The patient is here today to receive an epidural steroid injection to assist with pain.    Operative report  The patient was taken to the procedure room, was placed into a prone positioning. The lumbar area was prepped and draped sterilely in the normal fashion. Under fluoroscopic guidance the epidural space was identified. The skin and subcutaneous tissue was anesthetized with 1% lidocaine. An 18-gauge Tuohy needle was introduced using the normal saline loss-of-resistance technique. Once the loss of resistance had been achieved, final positioning of the needle was confirmed with negative aspiration of heme and CSF, with the injection of contrast material showing spread in the epidural space, confirmed in AP and lateral view. Then the patient received 80 mg of Depo-Medrol diluted into normal saline preservative-free and 2 mL of 0.25% bupivacaine making total volume of 8 mL. The patient tolerated the procedure well, was taken to the recovery room, allowed to recover sufficient amount of time and then was discharged home in stable condition. The patient was advised to followup with the [facility] to reassess improvement on as-needed basis.    Thank you for allowing me to participate in the care of this patient.    Ceasar Maher MD  Medical director of [facility]   9:17 AM  05/28/24 ^

## 2024-06-03 ENCOUNTER — APPOINTMENT (OUTPATIENT)
Dept: PRIMARY CARE | Facility: CLINIC | Age: 69
End: 2024-06-03
Payer: MEDICARE

## 2024-06-18 ENCOUNTER — TELEPHONE (OUTPATIENT)
Dept: PAIN MEDICINE | Facility: CLINIC | Age: 69
End: 2024-06-18

## 2024-06-24 ENCOUNTER — APPOINTMENT (OUTPATIENT)
Dept: PRIMARY CARE | Facility: CLINIC | Age: 69
End: 2024-06-24
Payer: MEDICARE

## 2024-06-24 VITALS
HEART RATE: 76 BPM | SYSTOLIC BLOOD PRESSURE: 114 MMHG | WEIGHT: 221 LBS | HEIGHT: 71 IN | BODY MASS INDEX: 30.94 KG/M2 | TEMPERATURE: 97.1 F | OXYGEN SATURATION: 97 % | DIASTOLIC BLOOD PRESSURE: 82 MMHG | RESPIRATION RATE: 16 BRPM

## 2024-06-24 DIAGNOSIS — R31.9 HEMATURIA, UNSPECIFIED TYPE: Primary | ICD-10-CM

## 2024-06-24 DIAGNOSIS — N20.0 LEFT RENAL STONE: ICD-10-CM

## 2024-06-24 LAB
POC APPEARANCE, URINE: CLEAR
POC BILIRUBIN, URINE: NEGATIVE
POC BLOOD, URINE: NEGATIVE
POC COLOR, URINE: YELLOW
POC GLUCOSE, URINE: NEGATIVE MG/DL
POC KETONES, URINE: NEGATIVE MG/DL
POC LEUKOCYTES, URINE: NEGATIVE
POC NITRITE,URINE: NEGATIVE
POC PH, URINE: 6 PH
POC PROTEIN, URINE: NEGATIVE MG/DL
POC SPECIFIC GRAVITY, URINE: 1.01
POC UROBILINOGEN, URINE: 0.2 EU/DL

## 2024-06-24 PROCEDURE — 1159F MED LIST DOCD IN RCRD: CPT | Performed by: FAMILY MEDICINE

## 2024-06-24 PROCEDURE — 99213 OFFICE O/P EST LOW 20 MIN: CPT | Performed by: FAMILY MEDICINE

## 2024-06-24 PROCEDURE — 3079F DIAST BP 80-89 MM HG: CPT | Performed by: FAMILY MEDICINE

## 2024-06-24 PROCEDURE — 81003 URINALYSIS AUTO W/O SCOPE: CPT | Performed by: FAMILY MEDICINE

## 2024-06-24 PROCEDURE — 1160F RVW MEDS BY RX/DR IN RCRD: CPT | Performed by: FAMILY MEDICINE

## 2024-06-24 PROCEDURE — 1036F TOBACCO NON-USER: CPT | Performed by: FAMILY MEDICINE

## 2024-06-24 PROCEDURE — 3074F SYST BP LT 130 MM HG: CPT | Performed by: FAMILY MEDICINE

## 2024-06-24 NOTE — PROGRESS NOTES
"Subjective   Patient ID: John Mar is a 69 y.o. male who presents for Nephrolithiasis.  HPI  Patient is here with smoky urine and a very reminiscent of his early past kidney stone history of  5 cm cyst of the right kidney but 2-3 kidney stones the left kidney has been followed by local urologist and agrees to follow them up today's urine is completely negative i.e. no nitrites no leukocytes and no blood at all although history last week was very reminiscent of recurrent passing stone he has no fever chills nausea vomiting left flank pain or dysuria at this time.  Does take hydrochlorothiazide low-dose for uric acid prevention stones as well as allopurinol 150 mg daily otherwise follow-up pain clinic for spondylolisthesis and chronic back pain and also local orthopedist for his knee.  Otherwise no abdominal pain vomiting or systemic symptoms at this time feels happy no scrotal pain no urethral discharge and no hematuria for the last 5 days.  Review of Systems   Respiratory:  Negative for cough and shortness of breath.    Cardiovascular:  Negative for chest pain, palpitations and leg swelling.   Gastrointestinal:  Negative for abdominal distention, abdominal pain, blood in stool, nausea and vomiting.   Genitourinary:  Positive for frequency and hematuria. Negative for dysuria, flank pain, penile discharge, penile pain, testicular pain and urgency.   Musculoskeletal:  Positive for arthralgias and back pain.       Objective   /82 (BP Location: Right arm, Patient Position: Sitting, BP Cuff Size: Large adult)   Pulse 76   Temp 36.2 °C (97.1 °F) (Temporal)   Resp 16   Ht 1.801 m (5' 10.91\")   Wt 100 kg (221 lb)   SpO2 97%   BMI 30.90 kg/m²         Narrative   Interpreted By:  JEANIE GUNN MD  MRN: [id number]  Patient Name: JOHN MAR     STUDY:  US RENAL BILAT;  7/12/2023 10:48 am     INDICATION:  left-sided flank pain and back pain.     COMPARISON:  None.     ACCESSION NUMBER(S):  [id number]     ORDERING " CLINICIAN:  WILL FREEMAN     TECHNIQUE:  Multiple images of the kidneys were obtained  .     FINDINGS:  RIGHT KIDNEY:  Right kidney measures up to 12.7 cm, shows normal echotexture without  any hydronephrosis. Normal cortical thickness.     5.5 cm cyst in the anterior aspect of the midpole region, a 1.5 cm  cyst in the upper pole.     LEFT KIDNEY:  Left kidney measures up to 11.5 cm, shows normal echotexture without  any hydronephrosis. Normal cortical thickness. At least 3 stones  noted, 2 in the upper pole, 1 in the lower pole, the upper pole  calculi measure up to 2.1 cm and 1 cm. 2.6 cm cyst in the midpole  region.     BLADDER:  Partially distended urinary bladder is grossly unremarkable.     Prevoid urinary bladder volume is 122 mL, postvoid residue 75 mL, 61%  postvoid residual. Right and left ureteral jets are seen.      Impression   Calculi in the left kidney as described above. No hydronephrosis.     Significant postvoid residue noted.   A Automated manually resulted (Order [id number])  All Reviewers List    Miguel Garrett MA on 6/26/2024 14:02   Will Freeman DO on 6/25/2024 17:01     POCT UA Automated manually resulted  Order: [id number]   Status: Final result       Visible to patient: Yes (seen)       Next appt: 08/26/2024 at 09:30 AM in [facility] (Will Freeman DO)       Dx: Hematuria, unspecified type    1 Result Note      Component  Ref Range & Units 3 d ago   POC Color, Urine  Straw, Yellow, Light-Yellow Yellow   POC Appearance, Urine  Clear Clear   POC Glucose, Urine  NEGATIVE mg/dl NEGATIVE   POC Bilirubin, Urine  NEGATIVE NEGATIVE   POC Ketones, Urine  NEGATIVE mg/dl NEGATIVE   POC Specific Gravity, Urine  1.005 - 1.035 1.010   POC Blood, Urine  NEGATIVE NEGATIVE   POC PH, Urine  No Reference Range Established PH 6.0   POC Protein, Urine  NEGATIVE, 30 (1+) mg/dl NEGATIVE   POC Urobilinogen, Urine  0.2, 1.0 EU/DL 0.2   Poc Nitrite, Urine  NEGATIVE NEGATIVE   POC Leukocytes, Urine  NEGATIVE  NEGATIVE                       Physical Exam  Sign reviewed and normal  General-inspection reveals pleasant interactive individual in no acute distress  Chest chest is clear anteriorly and posteriorly  Cardiovascular-RSR without murmurs gallop or ectopy  Abdominal no organomegaly mass rebound specifically no suprapubic tenderness and no CVA tenderness bilaterally bowel sounds are normal no rebound  Peripheral vascular no symmetric or asymmetric edema  Skin no rashes petechiae jaundice  Mood mood is stable  Urine urine is completely negative i.e. no microscopic dipstick hematuria and no pyuria or nitrites      Assessment/Plan   Problem List Items Addressed This Visit    None  Visit Diagnoses       Hematuria, unspecified type        Relevant Orders    POCT UA Automated manually resulted        Very suggestive of earlier passed stone but with history of prior renal calculi and prior urologist, I did review to continue increasing liquids and he will contact his local urologist for follow-up to see if further x-rays are needed.  He agrees to this is aware of prior cyst of the right and kidney stones in the left and will continue follow-up with his new or pain physician-spine surgeon in [place]  Prefer to recheck in 6 to 8 weeks reviewed the above progress perhaps follow-up ultrasound later this chelo for the kidneys if it is not done by his local urologist  Continue healthy routines continue above hydrochlorothiazide for uric acid stones as well as allopurinol aware of calling parameters ER parameters of significant pain fever chills or gross hematuria would return to which he concurs  Call or go to ER if worsening abdominal/flank pain,vomiting, difficulty voiding or inability to tolerate liquids/meds

## 2024-06-26 ENCOUNTER — TELEPHONE (OUTPATIENT)
Dept: PAIN MEDICINE | Facility: CLINIC | Age: 69
End: 2024-06-26
Payer: MEDICARE

## 2024-06-26 NOTE — TELEPHONE ENCOUNTER
Calls in reporting that initially the RFA to the lumbar region has given great relief  he did begin to experience extreme pain yesterday this pain is in the right side only pain is in the back and shoots down around the pelvis and he notes that his right knee is painful and feels he has numbness all around the knee  he states he can barely walk today what do you advise  he has take tylenol and motrin    DID CALL HIM BACK AND RELAYED THE MESSAGE

## 2024-06-27 PROBLEM — R31.9 HEMATURIA: Status: ACTIVE | Noted: 2024-06-27

## 2024-06-27 ASSESSMENT — ENCOUNTER SYMPTOMS
COUGH: 0
FLANK PAIN: 0
SHORTNESS OF BREATH: 0
VOMITING: 0
FREQUENCY: 1
BACK PAIN: 1
NAUSEA: 0
HEMATURIA: 1
BLOOD IN STOOL: 0
DYSURIA: 0
PALPITATIONS: 0
ABDOMINAL DISTENTION: 0
ABDOMINAL PAIN: 0
ARTHRALGIAS: 1

## 2024-07-24 ENCOUNTER — OFFICE VISIT (OUTPATIENT)
Dept: PAIN MEDICINE | Facility: CLINIC | Age: 69
End: 2024-07-24
Payer: MEDICARE

## 2024-07-24 VITALS
DIASTOLIC BLOOD PRESSURE: 98 MMHG | OXYGEN SATURATION: 97 % | TEMPERATURE: 97.1 F | SYSTOLIC BLOOD PRESSURE: 162 MMHG | HEART RATE: 75 BPM | RESPIRATION RATE: 20 BRPM

## 2024-07-24 DIAGNOSIS — M54.16 LUMBAR RADICULOPATHY, CHRONIC: Primary | ICD-10-CM

## 2024-07-24 PROCEDURE — 99214 OFFICE O/P EST MOD 30 MIN: CPT

## 2024-07-24 ASSESSMENT — COLUMBIA-SUICIDE SEVERITY RATING SCALE - C-SSRS
1. IN THE PAST MONTH, HAVE YOU WISHED YOU WERE DEAD OR WISHED YOU COULD GO TO SLEEP AND NOT WAKE UP?: NO
6. HAVE YOU EVER DONE ANYTHING, STARTED TO DO ANYTHING, OR PREPARED TO DO ANYTHING TO END YOUR LIFE?: NO
2. HAVE YOU ACTUALLY HAD ANY THOUGHTS OF KILLING YOURSELF?: NO

## 2024-07-24 ASSESSMENT — PATIENT HEALTH QUESTIONNAIRE - PHQ9
2. FEELING DOWN, DEPRESSED OR HOPELESS: NOT AT ALL
1. LITTLE INTEREST OR PLEASURE IN DOING THINGS: NOT AT ALL
SUM OF ALL RESPONSES TO PHQ9 QUESTIONS 1 AND 2: 0

## 2024-07-24 ASSESSMENT — PAIN DESCRIPTION - DESCRIPTORS: DESCRIPTORS: ACHING;SHARP;NUMBNESS

## 2024-07-24 ASSESSMENT — PAIN - FUNCTIONAL ASSESSMENT: PAIN_FUNCTIONAL_ASSESSMENT: 0-10

## 2024-07-24 ASSESSMENT — PAIN SCALES - GENERAL
PAINLEVEL: 4
PAINLEVEL_OUTOF10: 4

## 2024-07-24 NOTE — PROGRESS NOTES
Patient here for follow up after lumbar radiofrequency ablation. Has had back pain on the right side that radiates around hip and down the front of right leg. Pain in knee with numbness. Patient states he can't tell if he has trouble lifting his leg due to pain or weakness

## 2024-07-24 NOTE — PROGRESS NOTES
Subjective   Patient ID: John Garvin is a 69 y.o. male who presents for Back Pain.  HPI    68 yo male presents today for follow up on low back pain. He had RFA bilateral medial nerve branch block L3- L4, L4- L5 in May. He reports good pain relief initially from the RFA, however, in June he started to experience severe low back pain with some difficulty ambulating. This has improved over the past few weeks with antiinflammatories. Standing is extremely painful. Pain today is rated 4-5/10 in the low back with radiation to the right hip, groin and knee. Pain gets worse as the day goes on. Denies any bowel or bladder incontinence. He is taking ibuprofen and acetaminophen for pain relief. Saw neurosurgeon on 07/09, Dr. Jacobsen who recommended physical therapy and imaging.      Oswestry: 15    Review of Systems  All 13 systems were reviewed and are within normal levels except as noted below or per HPI. Positive and pertinent negative responses are noted below or in the HPI   Denied any fever or chills. No weight loss and no night sweats. No cough or sputum production. No diarrhea   Denies constipation.   No bladder and bowel incontinence and no other changes in bladder and bowel. No skin changes. Reports tiredness and fatigability only if the pain is not controlled.   Denied opioids diversion and abuse and denies alcoholism. Denies overuse of the pain medications.      Objective   Physical Exam    General   Alert and oriented x4, pleasant and cooperative.      HEENT  Pupils are equal and normal in size. Ears, nose, mouth, and throat appear to be WNL.  Head atraumatic, symmetric.      No signs of sedation or signs of withdrawal apparent.     Psychiatric   No signs of depression apparent. Appropriate mood and affect.     Neuro   No focal neurological deficit apparent. Ambulation at baseline. Normal visual inspection of the lumbar spine. Lumbar facets tender to palpation. Bilateral SI joints nontender to palpation. Right CAN  positive. SILT. Normal strength and sensation in the bilateral lower extremities.      Respiratory  No respiratory distress, respirations equal and unlabored.     Abdomen  Soft and nontender, no distention noted.     Skin  Warm, dry and intact. No skin markings supportive of recent IV drug usage .            Assessment/Plan        68 yo male with history and physical exam supportive of chronic low back pain associated with lumbar spondylosis and lumbar radiculopathy.     Schedule midline L4- L5 epidural steroid injection under fluoroscopic guidance.     Follow up as needed.     Explained plan to this patient, and patient verbalized understanding and agreement with the plan.     Please do not hesitate to contact the pain clinic after your visit with any questions or concerns at  M-F 8-4 pm     Patient was reminded not to share medications, not to take prescription medications that were not prescribed to the patient, and not to increase or change dose without consulting the pain clinic. I advised the patient to always take the least amount of medication needed to keep symptoms under control.       Lizbeth Elias, VISHNU-CNP 07/24/24 10:12 AM

## 2024-08-15 ENCOUNTER — LAB (OUTPATIENT)
Dept: LAB | Facility: LAB | Age: 69
End: 2024-08-15
Payer: MEDICARE

## 2024-08-15 DIAGNOSIS — N20.0 URIC ACID KIDNEY STONE: ICD-10-CM

## 2024-08-15 DIAGNOSIS — Z12.5 PROSTATE CANCER SCREENING: ICD-10-CM

## 2024-08-15 DIAGNOSIS — E78.5 DYSLIPIDEMIA, GOAL LDL BELOW 130: ICD-10-CM

## 2024-08-15 LAB
ANION GAP SERPL CALC-SCNC: 12 MMOL/L (ref 10–20)
BUN SERPL-MCNC: 25 MG/DL (ref 6–23)
CALCIUM SERPL-MCNC: 9.1 MG/DL (ref 8.6–10.3)
CHLORIDE SERPL-SCNC: 107 MMOL/L (ref 98–107)
CO2 SERPL-SCNC: 26 MMOL/L (ref 21–32)
CREAT SERPL-MCNC: 0.88 MG/DL (ref 0.5–1.3)
EGFRCR SERPLBLD CKD-EPI 2021: >90 ML/MIN/1.73M*2
GLUCOSE SERPL-MCNC: 92 MG/DL (ref 74–99)
LDLC SERPL DIRECT ASSAY-MCNC: 125 MG/DL (ref 0–129)
POTASSIUM SERPL-SCNC: 4.3 MMOL/L (ref 3.5–5.3)
PSA SERPL-MCNC: 2.05 NG/ML
SODIUM SERPL-SCNC: 141 MMOL/L (ref 136–145)

## 2024-08-15 PROCEDURE — 83721 ASSAY OF BLOOD LIPOPROTEIN: CPT

## 2024-08-15 PROCEDURE — 36415 COLL VENOUS BLD VENIPUNCTURE: CPT

## 2024-08-15 PROCEDURE — G0103 PSA SCREENING: HCPCS

## 2024-08-15 PROCEDURE — 80048 BASIC METABOLIC PNL TOTAL CA: CPT

## 2024-08-23 ENCOUNTER — OFFICE VISIT (OUTPATIENT)
Dept: ORTHOPEDIC SURGERY | Facility: CLINIC | Age: 69
End: 2024-08-23
Payer: MEDICARE

## 2024-08-23 VITALS — BODY MASS INDEX: 34.53 KG/M2 | HEIGHT: 67 IN | WEIGHT: 220 LBS

## 2024-08-23 DIAGNOSIS — M18.10 ARTHRITIS OF CARPOMETACARPAL (CMC) JOINT OF THUMB: Primary | ICD-10-CM

## 2024-08-23 PROCEDURE — 1159F MED LIST DOCD IN RCRD: CPT | Performed by: STUDENT IN AN ORGANIZED HEALTH CARE EDUCATION/TRAINING PROGRAM

## 2024-08-23 PROCEDURE — 1125F AMNT PAIN NOTED PAIN PRSNT: CPT | Performed by: STUDENT IN AN ORGANIZED HEALTH CARE EDUCATION/TRAINING PROGRAM

## 2024-08-23 PROCEDURE — 3008F BODY MASS INDEX DOCD: CPT | Performed by: STUDENT IN AN ORGANIZED HEALTH CARE EDUCATION/TRAINING PROGRAM

## 2024-08-23 PROCEDURE — 1036F TOBACCO NON-USER: CPT | Performed by: STUDENT IN AN ORGANIZED HEALTH CARE EDUCATION/TRAINING PROGRAM

## 2024-08-23 PROCEDURE — 99214 OFFICE O/P EST MOD 30 MIN: CPT | Performed by: STUDENT IN AN ORGANIZED HEALTH CARE EDUCATION/TRAINING PROGRAM

## 2024-08-23 PROCEDURE — 1123F ACP DISCUSS/DSCN MKR DOCD: CPT | Performed by: STUDENT IN AN ORGANIZED HEALTH CARE EDUCATION/TRAINING PROGRAM

## 2024-08-23 PROCEDURE — 20600 DRAIN/INJ JOINT/BURSA W/O US: CPT | Performed by: STUDENT IN AN ORGANIZED HEALTH CARE EDUCATION/TRAINING PROGRAM

## 2024-08-23 RX ORDER — LIDOCAINE HYDROCHLORIDE 10 MG/ML
0.5 INJECTION INFILTRATION; PERINEURAL
Status: COMPLETED | OUTPATIENT
Start: 2024-08-23 | End: 2024-08-23

## 2024-08-23 ASSESSMENT — PAIN - FUNCTIONAL ASSESSMENT: PAIN_FUNCTIONAL_ASSESSMENT: 0-10

## 2024-08-23 ASSESSMENT — PAIN SCALES - GENERAL: PAINLEVEL_OUTOF10: 3

## 2024-08-23 ASSESSMENT — PAIN DESCRIPTION - DESCRIPTORS: DESCRIPTORS: SORE;ACHING

## 2024-08-23 NOTE — PROGRESS NOTES
History of Present Illness:  Presents for evaluation of bilateral thumb  pain.  Patient denies inciting trauma.  The pain is localized about the base of the thumb. It is described as moderate. The pain occurs intermittantly and is worse with pinching and gripping. The patient presents due to persistent symptoms even with activity modification.      Review of Systems   GENERAL: Negative for malaise, significant weight loss, fever  MUSCULOSKELETAL: see HPI  NEURO:  Negative    The patient's past medical history, family history, social history, and review of systems were reviewed. History is otherwise negative except as stated in the HPI.    Physical Examination:  General: Alert and oriented to person, place, and time.  No acute distress and breathing comfortably: Pleasant and cooperative with examination.  HEENT: Head is normocephalic and atraumatic.  Neck: Supple, no visible swelling.  Cardiovascular: No palpable tachycardia  Lungs: No audible wheezing or labored breathing  Abdomen: Nondistended.  On musculoskeletal examination, the patient has full elbow range of motion. In regards to the wrist, there is no obvious deformity. Range of motion is full in flexion, extension, pronation, and supination. Strength is 5/5 in flexion and extension. There is tenderness to palpation of the thumb CMC joint. Provocative testing with CMC grind is positive with crepitus. The thumb MP joint demonstrates no hyperextension instability. There is no tenderness to palpation about the 1st dorsal compartment, the SL interval, or the TFCC. Sensation and motor function are intact in the radial, ulnar, and median nerve distribution. There is no obvious thenar or intrinsic atrophy. All fingers are without triggering and are without pain over the A1 pulley. The patient can make a full composite fist. The hand itself is warm and well perfused. The skin is intact throughout. The contralateral hand and wrist are normal to inspection, range of  motion, stability, and strength.    S Inj/Asp: bilateral thumb CMC on 8/23/2024 4:12 PM  Indications: pain  Details: 24 G needle, plantar approach  Medications (Right): 5 mg triamcinolone acetonide 10 mg/mL; 0.5 mL lidocaine 10 mg/mL (1 %)  Medications (Left): 5 mg triamcinolone acetonide 10 mg/mL; 0.5 mL lidocaine 10 mg/mL (1 %)  Outcome: tolerated well, no immediate complications  Procedure, treatment alternatives, risks and benefits explained, specific risks discussed. Consent was given by the patient. Immediately prior to procedure a time out was called to verify the correct patient, procedure, equipment, support staff and site/side marked as required. Patient was prepped and draped in the usual sterile fashion.             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  Anticipating spine surgery in the coming months.  History of right thumb CMC injection by Dr. Reyes with over a year of improvement.    Plan:    Bilateral thumb CMC injection. I had a long discussion with the patient regarding the diagnosis of thumb CMC arthritis and the risks/benefits/expected outcomes of various treatment options. At this point, the patient elected non-operative treatment consisting of activity modification, intermittant NSAIDs (if not medically contraindicated), and/or thumb spica splinting. I also discussed the option of a corticosteroid injection. Specifically, I reviewed the risks of injection which include, but are not limited to, infection, bleeding, pain, steroid flare, glycemic alteration, subcutaneous fat atrophy, skin hypopigmentation, soft tissue damage, and incomplete symptom relief. The patient consented to the injection, and then using sterile technique, I injected a 1mL of Kenalog 40 into the thumb CMC joint. The injection site was dressed, and the patient tolerated the injection well. Finally, I have emphasized patience, as any benefit may take some time to manifest. Depending on the success of this  non-operative course, I will see them back on an as needed basis.      Follow up: As needed.  At follow-up would like bilateral hand x-rays      Morelia Sharma MD  Orthopaedic Surgeon

## 2024-08-26 ENCOUNTER — APPOINTMENT (OUTPATIENT)
Dept: PRIMARY CARE | Facility: CLINIC | Age: 69
End: 2024-08-26
Payer: MEDICARE

## 2024-08-27 ENCOUNTER — HOSPITAL ENCOUNTER (OUTPATIENT)
Dept: PAIN MEDICINE | Facility: CLINIC | Age: 69
Discharge: HOME | End: 2024-08-27
Payer: MEDICARE

## 2024-08-27 VITALS
TEMPERATURE: 96.9 F | HEART RATE: 69 BPM | OXYGEN SATURATION: 96 % | RESPIRATION RATE: 18 BRPM | SYSTOLIC BLOOD PRESSURE: 163 MMHG | DIASTOLIC BLOOD PRESSURE: 84 MMHG

## 2024-08-27 DIAGNOSIS — M54.16 LUMBAR RADICULOPATHY, CHRONIC: ICD-10-CM

## 2024-08-27 PROCEDURE — 2550000001 HC RX 255 CONTRASTS: Performed by: PAIN MEDICINE

## 2024-08-27 PROCEDURE — 62323 NJX INTERLAMINAR LMBR/SAC: CPT | Performed by: PAIN MEDICINE

## 2024-08-27 PROCEDURE — 2500000005 HC RX 250 GENERAL PHARMACY W/O HCPCS: Performed by: PAIN MEDICINE

## 2024-08-27 PROCEDURE — 2500000004 HC RX 250 GENERAL PHARMACY W/ HCPCS (ALT 636 FOR OP/ED): Performed by: PAIN MEDICINE

## 2024-08-27 RX ORDER — LIDOCAINE HYDROCHLORIDE 10 MG/ML
INJECTION, SOLUTION EPIDURAL; INFILTRATION; INTRACAUDAL; PERINEURAL AS NEEDED
Status: COMPLETED | OUTPATIENT
Start: 2024-08-27 | End: 2024-08-27

## 2024-08-27 RX ORDER — METHYLPREDNISOLONE ACETATE 80 MG/ML
INJECTION, SUSPENSION INTRA-ARTICULAR; INTRALESIONAL; INTRAMUSCULAR; SOFT TISSUE AS NEEDED
Status: COMPLETED | OUTPATIENT
Start: 2024-08-27 | End: 2024-08-27

## 2024-08-27 ASSESSMENT — ENCOUNTER SYMPTOMS
DEPRESSION: 0
OCCASIONAL FEELINGS OF UNSTEADINESS: 0
LOSS OF SENSATION IN FEET: 0

## 2024-08-27 ASSESSMENT — PAIN SCALES - GENERAL: PAINLEVEL_OUTOF10: 8

## 2024-08-27 ASSESSMENT — PAIN - FUNCTIONAL ASSESSMENT: PAIN_FUNCTIONAL_ASSESSMENT: 0-10

## 2024-08-27 ASSESSMENT — PAIN DESCRIPTION - DESCRIPTORS: DESCRIPTORS: ACHING

## 2024-08-27 NOTE — DISCHARGE INSTRUCTIONS
Post-injection instructions:    Your pain may not be gone immediately after the procedure--it usually takes the steroid 3-5 days to start working.   It may take several weeks for the medicine to reach its' full effect.   Pay attention to how much pain relief (what percentage compared to before the procedure) you get and for how long it lasts.     Activity: Avoid strenuous activity for 24 hours. After that return to your normal activity level.     Bandages: Remove after 24 hours     Showering/Bathing: You may shower after bandage is removed     Follow up: CALL OFFICE IN 7 DAYS [phone] LEAVE MESSAGE ABOUT THE RELIEF THAT WAS OBTAINED      Call the doctor immediately: if you notice:     Excessive bleeding from procedure site (brisk bright red bleeding from the site or bleeding that soaks the bandages or does not stop)   Severe headache  Inability to walk, leg or arm weakness or numbness that is worse after the procedure   Uncontrolled pain   New urinary or fecal incontinence   Signs of infection: Fever above 101.5F, redness, swelling, pus or drainage from the site    Epidural Injection    Why is this procedure done?  With an epidural injection, the doctor injects drugs deep into the area around your spinal cord. This is different than epidural anesthesia that is used for surgery or when a woman has a baby. Your spine is a group of bones in your back that protect the nerves in your spinal cord. Problems with your spine can cause swollen nerves in the spinal cord. This swelling leads to pain and can limit movement. In an epidural injection, the doctor may give you a drug to help with swelling and pain.  You may have an epidural injection in different parts of your back, based on where your pain is. For pain in your head or arms, you may have a cervical epidural injection. If your pain is in your upper or middle back, you may get a thoracic epidural injection. For pain in your lower back or legs, you may get a  lumbar epidural injection.    What will the results be?  The treatment may:  Lower pain  Reduce swelling in the nerves  Improve movement  What happens before the procedure?  Your doctor will take your history. Talk to the doctor about:  All the drugs you are taking. Be sure to include all prescription and over-the-counter (OTC) drugs, and herbal supplements. Tell the doctor about any drug allergy. Bring a list of drugs you take with you.  If you have high blood sugar or diabetes. Your drugs may need to be changed.  Any bleeding problems. Be sure to tell your doctor if you are taking any drugs that may cause bleeding. Some of these are warfarin, rivaroxaban, apixaban, ticagrelor, clopidogrel, ketorolac, ibuprofen, naproxen, or aspirin. Certain vitamins and herbs, such as garlic and fish oil, may also add to the risk for bleeding. You may need to stop these drugs as well. Talk to your doctor about them.  Tell the doctor if you are pregnant.  You will not be allowed to drive right away after the procedure. Ask a family member or a friend to drive you home.  What happens during the procedure?  To help the doctor make sure the drugs are being injected in the right place, your doctor may do an x-ray of your spine. Other times your doctor may do a continuous x-ray during the procedure. This is a fluoroscopy. The doctor may also use a colored dye or a contrast dye to check where to inject the drug.  You may be given a drug to help you relax. You may be given a drug to make the area of the injection numb.  The doctor will clean the skin on your back or neck. This helps prevent infection.  The doctor will put a needle through the skin toward your spine. The drug will be injected into a space near the spine.  The needle will be taken out and a bandage will be placed over the injection site.  What happens after the procedure?  Staff will check on you to make sure you are doing well. They will tell you when you can go  home.  What care is needed at home?  Relax on the day of the injection.  Do not drive or run machines for at least 12 hours afterwards.  Apply ice to the injection site. Place an ice pack or a bag of frozen peas wrapped in a towel over the painful part. Never put ice right on the skin. Do not leave the ice on more than 10 to 15 minutes at a time.  It may take a few days before you will feel the effects of the injection.  What follow-up care is needed?  Your doctor may ask you to make visits to the office to check on your progress. Be sure to keep these visits. You may also need to see a physical therapist (PT). The PT will teach you exercises to help you get back your strength and motion. Ask your doctor when you can exercise.  What problems could happen?  Bleeding  Infection (rare)  Headache  Nerve injury  If you have diabetes, your blood sugar can go up after the injection. Check with your doctor if you need more treatment for this.  Last Reviewed Date

## 2024-09-12 ENCOUNTER — OFFICE VISIT (OUTPATIENT)
Dept: UROLOGY | Facility: CLINIC | Age: 69
End: 2024-09-12
Payer: MEDICARE

## 2024-09-12 VITALS
HEART RATE: 75 BPM | WEIGHT: 216.71 LBS | HEIGHT: 67 IN | BODY MASS INDEX: 34.01 KG/M2 | DIASTOLIC BLOOD PRESSURE: 80 MMHG | SYSTOLIC BLOOD PRESSURE: 137 MMHG

## 2024-09-12 DIAGNOSIS — R39.15 URINARY URGENCY: ICD-10-CM

## 2024-09-12 DIAGNOSIS — N20.0 LEFT RENAL STONE: Primary | ICD-10-CM

## 2024-09-12 PROCEDURE — 1123F ACP DISCUSS/DSCN MKR DOCD: CPT | Performed by: UROLOGY

## 2024-09-12 PROCEDURE — 3079F DIAST BP 80-89 MM HG: CPT | Performed by: UROLOGY

## 2024-09-12 PROCEDURE — 99214 OFFICE O/P EST MOD 30 MIN: CPT | Performed by: UROLOGY

## 2024-09-12 PROCEDURE — 3075F SYST BP GE 130 - 139MM HG: CPT | Performed by: UROLOGY

## 2024-09-12 PROCEDURE — G2211 COMPLEX E/M VISIT ADD ON: HCPCS | Performed by: UROLOGY

## 2024-09-12 PROCEDURE — 1160F RVW MEDS BY RX/DR IN RCRD: CPT | Performed by: UROLOGY

## 2024-09-12 PROCEDURE — 3008F BODY MASS INDEX DOCD: CPT | Performed by: UROLOGY

## 2024-09-12 PROCEDURE — 99204 OFFICE O/P NEW MOD 45 MIN: CPT | Performed by: UROLOGY

## 2024-09-12 PROCEDURE — 1159F MED LIST DOCD IN RCRD: CPT | Performed by: UROLOGY

## 2024-09-12 NOTE — PROGRESS NOTES
History Of Present Illness  69-year-old male seeing me regarding nephrolithiasis, erectile dysfunction  PMH: ED, nephrolithiasis, hypertension, hyperlipidemia, gout, LINDA  PSH: Prior PCNL with Dr. Candelario 11/2020 for a 1.5 cm left lower pole stone  Stone analysis - CaOx  PSA 2024 - 2.05  Scr 0.88    Renal ultrasound 07/23: 3 stones in the left kidney, 2 in upper pole, 1 in lower pole.  Stones measure up to 2.1 cm in size  PVR 75cc  KUB 5/14/24 - large amount of stones in L kidney, mostly upper pole    Pt reports slow stream in am. Otherwise during the day minimal symptoms. Mild urgency. No frequency, straining, intermittency  NTF 0x  No UTIs, no gross hematuria or flank pain. Asymptomatic.     Past Medical History  He has a past medical history of Acute maxillary sinusitis, unspecified (11/27/2019), Calculus of kidney (03/14/2019), Calculus of kidney (03/10/2020), Cervicogenic headache (12/22/2020), Dorsalgia, unspecified, Epigastric pain (05/29/2020), Erectile dysfunction, Ganglion, right hand (01/04/2023), Insect bite (nonvenomous) of other part of head, initial encounter (09/08/2021), Pain due to genitourinary prosthetic devices, implants and grafts, initial encounter (CMS-HCC), Personal history of other diseases of the musculoskeletal system and connective tissue, Personal history of other diseases of urinary system (03/10/2020), Personal history of other specified conditions (08/20/2021), Personal history of other specified conditions (02/19/2020), Personal history of urinary calculi (06/25/2020), Personal history of urinary calculi, and Sleep apnea.    Surgical History  He has a past surgical history that includes Other surgical history (03/14/2019); Other surgical history (03/14/2019); Other surgical history (12/14/2022); Other surgical history (06/30/2022); Other surgical history (07/16/2021); Other surgical history (05/20/2019); Other surgical history (05/20/2019); and Kidney stone surgery (1985 / 2021).    "  Social History  He reports that he has never smoked. He has been exposed to tobacco smoke. He has never used smokeless tobacco. He reports that he does not drink alcohol and does not use drugs.    Family History  Family History   Problem Relation Name Age of Onset    Lung cancer Father Jovany Garvin     Breast cancer Father Jovany Garvin     Other (cardiac disorder) Mother Fabiola Garvin     Heart disease Mother Fabiola Garvin     Breast cancer Sister      Other (cardiac disorder) Brother      Other (bladder cancer) Brother          Allergies  Adhesive, Mold, Morphine, and Yeast    ROS: 12 system review was completed and is negative with the exception of those signs and symptoms noted in the history of present illness: A 12 system review was completed and is negative with the exception of those signs and symptoms noted in the history of present illness.     Exam:  General: in NAD, appears stated age  Head: normocephalic, atraumatic  Respiratory: normal effort, no use of accessory muscles  Cardiovascular: no edema noted  Skin: normal turgor, no rashes  Neurologic: grossly intact, oriented to person/place/time  Psychiatric: mode and affect appropriate     Last Recorded Vitals  Blood pressure 137/80, pulse 75, height 1.702 m (5' 7\"), weight 98.3 kg (216 lb 11.4 oz).    Lab Results   Component Value Date    PSASCREEN 2.15 02/14/2023    CREATININE 0.88 08/15/2024    HGB 14.8 08/24/2023         ASSESSMENT/PLAN:  # Nephrolithiasis  -While on KUB patient appears to have a very significant stone burden, these appear similar to the stones that were seen in the upper pole in 2020 and 2021.  He may have a residual left lower pole stone as well.  He is completely asymptomatic with excellent renal function  -Recommend CT stone protocol to better assess stone burden  -Assuming no obstruction or new large stones, continue with observation with annual KUB  -Continue high fluid intake  -We discussed the potential of PCNL, " ureteroscopy described both surgeries today.  He has had them both before.  Anticipate he will not need them but we reviewed both perioperative and postoperative risks in the event that CT scan should suggest surgical intervention is warranted    # Lower urinary tract symptoms  -Mild, no indication for medication at this time    Mario Durand MD

## 2024-09-13 ENCOUNTER — TELEPHONE (OUTPATIENT)
Dept: PAIN MEDICINE | Facility: CLINIC | Age: 69
End: 2024-09-13
Payer: MEDICARE

## 2024-09-16 ENCOUNTER — APPOINTMENT (OUTPATIENT)
Dept: PRIMARY CARE | Facility: CLINIC | Age: 69
End: 2024-09-16
Payer: MEDICARE

## 2024-09-16 VITALS
SYSTOLIC BLOOD PRESSURE: 126 MMHG | BODY MASS INDEX: 33.27 KG/M2 | HEIGHT: 67 IN | RESPIRATION RATE: 16 BRPM | HEART RATE: 83 BPM | TEMPERATURE: 97.7 F | OXYGEN SATURATION: 97 % | DIASTOLIC BLOOD PRESSURE: 70 MMHG | WEIGHT: 212 LBS

## 2024-09-16 DIAGNOSIS — S40.861A INFECTED INSECT BITE OF RIGHT UPPER EXTREMITY, INITIAL ENCOUNTER: Primary | ICD-10-CM

## 2024-09-16 DIAGNOSIS — M54.16 LUMBAR RADICULOPATHY, CHRONIC: ICD-10-CM

## 2024-09-16 DIAGNOSIS — E78.5 DYSLIPIDEMIA, GOAL LDL BELOW 130: ICD-10-CM

## 2024-09-16 DIAGNOSIS — N52.1 ERECTILE DYSFUNCTION DUE TO DISEASES CLASSIFIED ELSEWHERE: ICD-10-CM

## 2024-09-16 DIAGNOSIS — L08.9 INFECTED INSECT BITE OF RIGHT UPPER EXTREMITY, INITIAL ENCOUNTER: Primary | ICD-10-CM
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
## 2024-09-16 DIAGNOSIS — G89.29 CHRONIC MIDLINE BACK PAIN, UNSPECIFIED BACK LOCATION: ICD-10-CM

## 2024-09-16 DIAGNOSIS — E79.0 HYPERURICEMIA: ICD-10-CM

## 2024-09-16 DIAGNOSIS — N20.0 LEFT RENAL STONE: ICD-10-CM

## 2024-09-16 DIAGNOSIS — M54.9 CHRONIC MIDLINE BACK PAIN, UNSPECIFIED BACK LOCATION: ICD-10-CM

## 2024-09-16 PROCEDURE — 1036F TOBACCO NON-USER: CPT | Performed by: FAMILY MEDICINE

## 2024-09-16 PROCEDURE — 3008F BODY MASS INDEX DOCD: CPT | Performed by: FAMILY MEDICINE

## 2024-09-16 PROCEDURE — 1123F ACP DISCUSS/DSCN MKR DOCD: CPT | Performed by: FAMILY MEDICINE

## 2024-09-16 PROCEDURE — 1159F MED LIST DOCD IN RCRD: CPT | Performed by: FAMILY MEDICINE

## 2024-09-16 PROCEDURE — 99213 OFFICE O/P EST LOW 20 MIN: CPT | Performed by: FAMILY MEDICINE

## 2024-09-16 PROCEDURE — 1160F RVW MEDS BY RX/DR IN RCRD: CPT | Performed by: FAMILY MEDICINE

## 2024-09-16 RX ORDER — TIZANIDINE 4 MG/1
4 TABLET ORAL 2 TIMES DAILY PRN
Qty: 180 TABLET | Refills: 3 | Status: SHIPPED | OUTPATIENT
Start: 2024-09-16 | End: 2025-09-11

## 2024-09-16 RX ORDER — TADALAFIL 20 MG/1
20 TABLET ORAL DAILY PRN
Qty: 30 TABLET | Refills: 3 | Status: SHIPPED | OUTPATIENT
Start: 2024-09-16

## 2024-09-16 RX ORDER — AMOXICILLIN 500 MG/1
500 CAPSULE ORAL 3 TIMES DAILY
Qty: 21 CAPSULE | Refills: 0 | Status: SHIPPED | OUTPATIENT
Start: 2024-09-16 | End: 2024-09-23

## 2024-09-16 RX ORDER — ALLOPURINOL 300 MG/1
150 TABLET ORAL DAILY
Qty: 45 TABLET | Refills: 3 | Status: SHIPPED | OUTPATIENT
Start: 2024-09-16

## 2024-09-16 RX ORDER — HYDROCHLOROTHIAZIDE 12.5 MG/1
12.5 TABLET ORAL DAILY
Qty: 90 TABLET | Refills: 3 | Status: SHIPPED | OUTPATIENT
Start: 2024-09-16

## 2024-09-16 NOTE — PROGRESS NOTES
Subjective   Patient ID: [id number] Garvin is a 69 y.o. male who presents for hyperuricemia/kidney stones  HPI  Pleasant male is here for recheck his hyperuricemia for which he takes hydrochlorothiazide to reduce his uric acid kidney stones followed by Dr. Mendez for his left renal cyst as well as a recurrent left renal calculi  Is followed by [facility] spine clinic and because of his lumbar canal stenosis as well as spondylolisthesis considering surgery this upcoming autumn.  Has almost daily pain in his mid to low back area.  Takes Motrin in the past without significant relief.  Gratefully on the above increasing liquids and allopurinol and hydrochlorothiazide no recent ureteral kidney stones no recent chest pain short of breath or palpitations chronic meds reviewed no reported side effects.  Also had right upper arm insect bite with some redness itching and swelling like to have something for this.  Otherwise no history of tick bite.  Stephanie active without exertional or resting chest pain short of breath or palpitations no new GI symptoms  Review of Systems   Constitutional:  Negative for fatigue, fever and unexpected weight change.   Eyes:  Negative for visual disturbance.   Respiratory:  Negative for cough, chest tightness and shortness of breath.    Cardiovascular:  Negative for chest pain, palpitations and leg swelling.   Gastrointestinal:  Negative for abdominal pain, blood in stool and vomiting.   Genitourinary:  Positive for frequency and urgency. Negative for dysuria, flank pain, hematuria and testicular pain.   Musculoskeletal:  Positive for arthralgias, back pain and myalgias.   Skin:  Positive for rash.   Neurological:  Negative for weakness, light-headedness and headaches.   Psychiatric/Behavioral:  Negative for behavioral problems, confusion, sleep disturbance and suicidal ideas.        Objective   /70 (BP Location: Right arm, Patient Position: Sitting, BP Cuff Size: Large adult)   Pulse 83   " Temp 36.5 °C (97.7 °F) (Temporal)   Resp 16   Ht 1.702 m (5' 7\")   Wt 96.2 kg (212 lb)   SpO2 97%   BMI 33.20 kg/m²       [facility]  Outside Information  Results  MR lumbar spine wo IV contrast (Order [id number])     MR lumbar spine wo IV contrast  Order: [id number]  Impression    IMPRESSION:    Lumbar spondylosis superimposed on developmentally short pedicles, as  detailed.    Anatomic Lumbar Variant: None. L4-5 is considered the level of the iliac  crest and assume there are 5 lumbar-type vertebrae.          : PSCB    Transcribe Date/Time: Jul 30 2024 10:11A    Dictated by : ROBYN JOHNS MD    This examination was interpreted and the report reviewed and  electronically signed by:  ROBYN JOHNS MD on Jul 30 2024 10:14AM  EST  Narrative    * * *Final Report* * *    DATE OF EXAM: Jul 30 2024 10:07AM      [facility]   0303  -  MRI LUMBAR SPINE WO IVCON  / ACCESSION #  [id number]    PROCEDURE REASON: Spinal stenosis of lumbar region with neurogenic  claudication        * * * * Physician Interpretation * * * *     MRI LUMBAR SPINE WO IVCON    CLINICAL HISTORY:  Spinal stenosis of lumbar region with neurogenic  claudication    - Spinal stenosis, spondylolisthesis, radiculopathy,  trauma    TECHNIQUE:  MRI lumbar spine routine protocol without contrast.  MQ: [id number]    COMPARISON:  None.    RESULT:    Counting reference:  Lumbosacral junction. For the purposes of this  report, L4-5 is considered the level of the iliac crest and assume there  are 5 lumbar-type vertebrae.  Anatomic variant:  None.    Localizer images: Multiple cysts of varying size in each kidney, the  largest partially exophytic on the right. Multiple circumscribed T2  hyperintense foci of varying size scattered in the liver.    Alignment:  Grade 1 degenerative spondylolisthesis at L4-5. Multilevel  mild intervertebral disc space narrowing.    Bone marrow signal/fracture: Likely reactive/degenerative patchy marrow  edema " involving the right greater than left L3/L4 pedicles and articular  facets. No evidence of confluent abnormal marrow replacement or an acute  fracture.    Conus:  The conus terminates at L1 and is within normal limits of caliber  and morphology. Normal course and caliber of the descending cauda equina  nerve roots.    Soft tissues:  Paraspinal soft tissues are within normal limits.    Diffuse relative lumbar canal and foraminal narrowing on a developmental  basis due to short pedicles.    T11-T12:  At least mild right foraminal stenosis. Canal and left foramen  appear patent.    T12-L1:  Canal and foramina are patent.    L1-L2:  Disc bulging with superimposed broad-based shallow disc  protrusion, facet and ligamentous hypertrophy, short pedicles; patent  canal with narrowing of each subarticular zone, patent right foramen, at  least mild left foraminal stenosis.    L2-L3:  Diffuse disc bulging with superimposed broad-based shallow disc  protrusion, facet and ligamentous hypertrophy, short pedicles; mild canal  stenosis with narrowing of each subarticular zone, mild bilateral  foraminal stenosis.    L3-L4:  Diffuse disc bulging with superimposed broad-based disc  protrusion, facet arthropathy, ligamentous hypertrophy, short pedicles;  moderate-severe canal stenosis, moderate right and mild left foraminal  stenosis.    L4-L5:  Spondylolisthesis, diffuse disc bulging with superimposed small  central disc protrusion, facet and ligamentous hypertrophy, short  pedicles; severe canal stenosis, moderate right and mild left foraminal  stenosis.    L5-S1:  Diffuse disc bulging with superimposed broad-based shallow  central disc protrusion, facet hypertrophy, short pedicles; patent canal,  mild right and moderate left foraminal stenosis.    Sacrum and iliac wings:  The visualized sacrum and iliac wings are within  normal limits.  The presacral soft tissues are normal in appearance.  Exam End: 07/30/24 10:07    Specimen  Collected: 07/30/24 10:07      Lab Results   Component Value Date    PSA 2.05 08/15/2024    PSA 1.84 10/29/2019     Component  Ref Range & Units 1 mo ago 1 yr ago 2 yr ago 4 yr ago   LDL, Direct  0 - 129 mg/dL 125 147 High   High   High  CM   Resulting Agency [facility]            etabolic panel  Order: [id number]   Status: Final result       Visible to patient: Yes (seen)       Dx: Uric acid kidney stone; Dyslipidemia,...    2 Result Notes            Component  Ref Range & Units 1 mo ago  (8/15/24) 1 yr ago  (8/24/23) 1 yr ago  (2/14/23) 1 yr ago  (1/5/23) 2 yr ago  (1/28/22) 3 yr ago  (11/9/20) 3 yr ago  (10/20/20)   Glucose  74 - 99 mg/dL 92 82 94 87 98 89 89   Sodium  136 - 145 mmol/L 141 138 140 138 140 141 142   Potassium  3.5 - 5.3 mmol/L 4.3 4.1 4.2 3.7 4.4 4.2 4.2   Chloride  98 - 107 mmol/L 107 101 104 104 103 104 105   Bicarbonate  21 - 32 mmol/L 26 29 28 26 31 30 29   Anion Gap  10 - 20 mmol/L 12 12 12 12 10 11 12   Urea Nitrogen  6 - 23 mg/dL 25 High  20 22 16 16 18 19   Creatinine  0.50 - 1.30 mg/dL 0.88 0.90 1.03 0.87 0.97 1.02 1.04   eGFR  >60 mL/min/1.73m*2 >90         Comment: Calculations of estimated GFR are performed using the 2021 CKD-EPI Study Refit equation without the race variable for the IDMS-Traceable creatinine methods.  https://jasn.asnjournals.org/content/early/2021/09/22/ASN.6095091442   Calcium  8.6 - 10.3 mg/dL 9.1 10.0 10.2 9.5 9.4 9.8 9.5   Resulting Agency [facility]              Specimen Collected        Narrative  Performed by: [facility]  Elevated levels of LDL cholesterol are recognized as a key factor in the development of atherosclerosis and CHD. The direct LDL cholesterol test can be used to assess cardiovascular risk and monitor therapy as a follow up to  a lipid profile when triglycerides are significantly  elevated.      Specimen Collected: 08/15/24 09:06 Last Resulted: 08/15/24 15:03        Physical Exam  Also reviewed and normal  General Inspection reveals a mildly overweight individual in no acute distress  Musculoskeletal a lot of pain in the midline L2-L5 area paravertebral muscle tightness is noted minimal tenderness the right SI joint more than left otherwise definite tenderness upon hyperflexion hyperextension  Skin no rashes petechiae jaundice except small red bite in the right upper biceps area with mild surrounding redness but no abscess seen  Neck neck is supple no mass adenopathy bruits rigidity  Cardiovascular RSR without murmurs gallop or ectopy of significance  Pulmonary chest clear anteriorly and posteriorly  Abdominal no organomegaly mass rebound no suprapubic or CVA tenderness      Assessment/Plan   Problem List Items Addressed This Visit       Erectile dysfunction    Hyperuricemia    Left renal stone   Continue allopurinol and hydrochlorothiazide because of uric acid kidney stones continue to follow with Dr. Mendez with improving LDL off statin therapy PSA is normal stable BMP  Continue follow-up for preop evaluation with the spinous clinic in the [facility] for potential surgery for spondylolisthesis and canal stenosis  Continue healthy routines otherwise chronic meds reviewed as well as use and side effects follow-up in 2 to 3 months reviewed the above surgical management  Increasing liquid strongly urged  PSA  Order: [id number]   Status: Final result       Visible to patient: Yes (seen)       Dx: Prostate cancer screening    2 Result Notes       Component  Ref Range & Units 1 mo ago 4 yr ago   Prostate Specific AG  <=4.00 ng/mL 2.05 1.84 R, CM   Resulting Agency [facility]              Narrative  Performed by: [facility]  The FDA requires that the method used for PSA assay be reported to the physician. Values obtained with different assay metho         DISPLAY PLAN FREE TEXT

## 2024-09-17 ENCOUNTER — HOSPITAL ENCOUNTER (OUTPATIENT)
Dept: RADIOLOGY | Facility: CLINIC | Age: 69
Discharge: HOME | End: 2024-09-17
Payer: MEDICARE

## 2024-09-17 DIAGNOSIS — N20.0 LEFT RENAL STONE: ICD-10-CM

## 2024-09-17 PROCEDURE — 74176 CT ABD & PELVIS W/O CONTRAST: CPT

## 2024-09-17 PROCEDURE — 74176 CT ABD & PELVIS W/O CONTRAST: CPT | Performed by: RADIOLOGY

## 2024-09-19 PROBLEM — J01.00 ACUTE NON-RECURRENT MAXILLARY SINUSITIS: Status: RESOLVED | Noted: 2023-02-15 | Resolved: 2024-09-19

## 2024-09-19 ASSESSMENT — ENCOUNTER SYMPTOMS
SLEEP DISTURBANCE: 0
FLANK PAIN: 0
BLOOD IN STOOL: 0
ABDOMINAL PAIN: 0
LIGHT-HEADEDNESS: 0
COUGH: 0
FREQUENCY: 1
DYSURIA: 0
SHORTNESS OF BREATH: 0
FEVER: 0
FATIGUE: 0
HEADACHES: 0
WEAKNESS: 0
PALPITATIONS: 0
CONFUSION: 0
CHEST TIGHTNESS: 0
MYALGIAS: 1
BACK PAIN: 1
UNEXPECTED WEIGHT CHANGE: 0
ARTHRALGIAS: 1
VOMITING: 0
HEMATURIA: 0

## 2024-09-26 ENCOUNTER — APPOINTMENT (OUTPATIENT)
Dept: UROLOGY | Facility: CLINIC | Age: 69
End: 2024-09-26
Payer: MEDICARE

## 2024-09-26 ENCOUNTER — OFFICE VISIT (OUTPATIENT)
Dept: ORTHOPEDIC SURGERY | Facility: CLINIC | Age: 69
End: 2024-09-26
Payer: MEDICARE

## 2024-09-26 DIAGNOSIS — M17.11 PRIMARY OSTEOARTHRITIS OF RIGHT KNEE: Primary | ICD-10-CM

## 2024-09-26 PROCEDURE — 99213 OFFICE O/P EST LOW 20 MIN: CPT | Performed by: ORTHOPAEDIC SURGERY

## 2024-09-26 PROCEDURE — 20610 DRAIN/INJ JOINT/BURSA W/O US: CPT | Performed by: ORTHOPAEDIC SURGERY

## 2024-09-26 PROCEDURE — 1159F MED LIST DOCD IN RCRD: CPT | Performed by: ORTHOPAEDIC SURGERY

## 2024-09-26 PROCEDURE — 1123F ACP DISCUSS/DSCN MKR DOCD: CPT | Performed by: ORTHOPAEDIC SURGERY

## 2024-09-26 RX ORDER — LIDOCAINE HYDROCHLORIDE 10 MG/ML
8 INJECTION, SOLUTION INFILTRATION; PERINEURAL
Status: COMPLETED | OUTPATIENT
Start: 2024-09-26 | End: 2024-09-26

## 2024-09-26 RX ORDER — TRIAMCINOLONE ACETONIDE 40 MG/ML
40 INJECTION, SUSPENSION INTRA-ARTICULAR; INTRAMUSCULAR
Status: COMPLETED | OUTPATIENT
Start: 2024-09-26 | End: 2024-09-26

## 2024-09-26 NOTE — PROGRESS NOTES
Chief Complaint   Patient presents with    Right Knee - Pain     Primary OA, s/p cortisone injection 5/2/2024     History of Present Illness:   Right knee cortisone injection on 05/02/24, worked well. He would like a repeat injection today. He is getting a lumbar fusion by Dr. Jacobsen at the [facility] in November.     He had a gel injection with gave some relief. Would like a cortisone injection today. He notes that he is getting a back ablation soon. He has been doing his exercises but states that he has been working too much.     Imaging:  Radiographs Knee: No acute fractures or dislocations.  Mild to moderate medial compartment arthritis    Assessment:   Right knee arthritis flare    Plan:  We reviewed the role of imaging, physical therapy, injections and the time frame to healing and correlation with outcome.  Ice: 60 minutes on and off  Exercise home program: Medically directed knee therapy / Handout given  Right knee cortisone injection today.   Follow-up as needed    L Inj/Asp: R knee on 9/26/2024 10:04 AM  Indications: pain  Details: 22 G needle, anterolateral approach  Medications: 8 mL lidocaine 10 mg/mL (1 %); 40 mg triamcinolone acetonide 40 mg/mL  Outcome: tolerated well, no immediate complications  Procedure, treatment alternatives, risks and benefits explained, specific risks discussed. Consent was given by the patient. Immediately prior to procedure a time out was called to verify the correct patient, procedure, equipment, support staff and site/side marked as required. Patient was prepped and draped in the usual sterile fashion.              Physical Exam:  Well-nourished, well-developed. No acute distress. Alert and oriented x3. Responds appropriately to questioning. Good mood. Normal affect.  Physical Exam  Right Knee:  ROM: Full motion  Positive Jcarlos´s test/PositiveApley Grind  Neurovascular exam normal distally  Palpable pedal pulse and good cap refill  Tenderness along medial joint  line    Review of Systems:  GENERAL: Negative for malaise, significant weight loss, fever  MUSCULOSKELETAL: See HPI  NEURO:  Negative     Past Medical History:   Diagnosis Date    Acute maxillary sinusitis, unspecified 11/27/2019    Acute non-recurrent maxillary sinusitis    Calculus of kidney 03/14/2019    Recurrent kidney stones    Calculus of kidney 03/10/2020    Uric acid kidney stone    Cervicogenic headache 12/22/2020    Headache, cervicogenic    Dorsalgia, unspecified     Upper back pain    Epigastric pain 05/29/2020    Abdominal wall pain in epigastric region    Erectile dysfunction     Ganglion, right hand 01/04/2023    Ganglion, finger of right hand    Insect bite (nonvenomous) of other part of head, initial encounter 09/08/2021    Insect bite of cheek, initial encounter    Pain due to genitourinary prosthetic devices, implants and grafts, initial encounter (CMS-HCC)     Pain due to ureteral stent    Personal history of other diseases of the musculoskeletal system and connective tissue     History of back pain    Personal history of other diseases of urinary system 03/10/2020    History of hematuria    Personal history of other specified conditions 08/20/2021    History of chronic fatigue    Personal history of other specified conditions 02/19/2020    History of left flank pain    Personal history of urinary calculi 06/25/2020    History of renal calculi    Personal history of urinary calculi     History of kidney stones    Sleep apnea        Medication Documentation Review Audit       Reviewed by Yasmine Goldberg MA (Medical Assistant) on 09/26/24 at 0932      Medication Order Taking? Sig Documenting Provider Last Dose Status   acetaminophen (TYLENOL ORAL) [id number] No Take 650 mg by mouth 3 times a day. CAPS Historical Provider, MD Taking Active   acetylcysteine 600 mg capsule [id number] No Take by mouth. Historical Provider, MD Not Taking Active   allopurinol (Zyloprim) 300 mg tablet [id number]  Take 0.5  tablets (150 mg) by mouth once daily. Alexander Garza DO  Active   amoxicillin (Amoxil) 500 mg capsule [id number]  Take 1 capsule (500 mg) by mouth 3 times a day for 7 days. Alexander Garza DO   24 2359   aspirin 81 mg EC tablet [id number] No Take 1 tablet (81 mg) by mouth once daily. Historical MD Rosalina Taking Active   cetirizine (ZyrTEC) 10 mg tablet [id number] No Take 1 tablet (10 mg) by mouth once daily. Matthew Romano MD Taking Active   cholecalciferol (Vitamin D-3) 50 mcg (2,000 unit) capsule [id number] No Take 1 capsule (50 mcg) by mouth once daily. Historical Provider, MD Taking Active   DIETARY SUPPLEMENT ORAL [id number] No Take by mouth. GRICELDA Romano MD Not Taking Active   fish oil concentrate (Omega-3) 120-180 mg capsule [id number] No Take 1 tablet by mouth 1 (one) time each day. Historical MD Rosalina Taking Active   fluticasone (Flonase) 50 mcg/actuation nasal spray [id number] No Administer 1 spray into each nostril once daily. Historical Provider, MD Taking Active   hyalur ac/chond sul/colg II/AA (HYALURONIC ACID, CHOND-COLLGN, ORAL) [id number] No Take by mouth. Historical Provider, MD Taking Active   hydroCHLOROthiazide (Microzide) 12.5 mg tablet [id number]  Take 1 tablet (12.5 mg) by mouth once daily. Alexander Garza DO  Active   ibuprofen (Motrin) capsule [id number] No Take 1 capsule (200 mg) by mouth 3 times a day. Patient takes 600 mg in am, and 400 mg for the afternoon and evening doses Historical Provider, MD Taking Active   melatonin 3 mg tablet [id number] No Take 1 tablet (3 mg) by mouth once daily at bedtime. Historical Provider, MD Taking Active   multivitamin tablet [id number] No Take 1 tablet by mouth once daily. Historical MD Rosalina Taking Active   potassium citrate CR (Urocit-K-15) 15 mEq ER tablet [id number] No Take 1 tablet (15 mEq) by mouth once daily. Historical MD Rosalina Taking Active   tadalafil (Cialis) 20 mg tablet [id number]  Take 1 tablet (20 mg) by  mouth once daily as needed for erectile dysfunction. Alexander Garza, DO  Active   tiZANidine (Zanaflex) 4 mg tablet [id number]  Take 1 tablet (4 mg) by mouth 2 times a day as needed for muscle spasms. Alexander Garza, DO  Active                    Allergies   Allergen Reactions    Adhesive Hives     hives for one week. :    Mold Unknown    Morphine Hives     Very Severe Reaction: Swelling    Yeast Other       Social History     Socioeconomic History    Marital status:      Spouse name: Not on file    Number of children: Not on file    Years of education: Not on file    Highest education level: Not on file   Occupational History    Not on file   Tobacco Use    Smoking status: Never     Passive exposure: Past    Smokeless tobacco: Never   Substance and Sexual Activity    Alcohol use: Never    Drug use: Never    Sexual activity: Yes     Partners: Female     Birth control/protection: Post-menopausal, Female Sterilization   Other Topics Concern    Not on file   Social History Narrative    Not on file     Social Determinants of Health     Financial Resource Strain: Not on file   Food Insecurity: Not on file   Transportation Needs: Not on file   Physical Activity: Not on file   Stress: Not on file   Social Connections: Not on file   Intimate Partner Violence: Not on file   Housing Stability: Not on file       Past Surgical History:   Procedure Laterality Date    KIDNEY STONE SURGERY  1985 / 2021    OTHER SURGICAL HISTORY  03/14/2019    Lithotripsy    OTHER SURGICAL HISTORY  03/14/2019    Nasal septoplasty    OTHER SURGICAL HISTORY  12/14/2022    Ganglion cyst excision    OTHER SURGICAL HISTORY  06/30/2022    Percutaneous nephrolithotomy    OTHER SURGICAL HISTORY  07/16/2021    Kidney surgery    OTHER SURGICAL HISTORY  05/20/2019    Partial atrial septal defect repair    OTHER SURGICAL HISTORY  05/20/2019    Lithotripsy       No results found.

## 2025-01-12 ENCOUNTER — APPOINTMENT (OUTPATIENT)
Dept: RADIOLOGY | Facility: HOSPITAL | Age: 70
End: 2025-01-12
Payer: MEDICARE

## 2025-01-12 ENCOUNTER — HOSPITAL ENCOUNTER (OUTPATIENT)
Facility: HOSPITAL | Age: 70
Setting detail: OBSERVATION
Discharge: HOME | End: 2025-01-12
Attending: STUDENT IN AN ORGANIZED HEALTH CARE EDUCATION/TRAINING PROGRAM | Admitting: STUDENT IN AN ORGANIZED HEALTH CARE EDUCATION/TRAINING PROGRAM
Payer: MEDICARE

## 2025-01-12 ENCOUNTER — APPOINTMENT (OUTPATIENT)
Dept: CARDIOLOGY | Facility: HOSPITAL | Age: 70
End: 2025-01-12
Payer: MEDICARE

## 2025-01-12 VITALS
RESPIRATION RATE: 16 BRPM | HEIGHT: 71 IN | SYSTOLIC BLOOD PRESSURE: 139 MMHG | DIASTOLIC BLOOD PRESSURE: 72 MMHG | OXYGEN SATURATION: 96 % | TEMPERATURE: 97.5 F | BODY MASS INDEX: 30.4 KG/M2 | WEIGHT: 217.15 LBS | HEART RATE: 89 BPM

## 2025-01-12 DIAGNOSIS — I26.99 BILATERAL PULMONARY EMBOLISM (MULTI): Primary | ICD-10-CM

## 2025-01-12 DIAGNOSIS — I26.99 PE (PULMONARY THROMBOEMBOLISM) (MULTI): ICD-10-CM

## 2025-01-12 LAB
ALBUMIN SERPL BCP-MCNC: 3.7 G/DL (ref 3.4–5)
ALP SERPL-CCNC: 80 U/L (ref 33–136)
ALT SERPL W P-5'-P-CCNC: 12 U/L (ref 10–52)
ANION GAP SERPL CALC-SCNC: 12 MMOL/L (ref 10–20)
AST SERPL W P-5'-P-CCNC: 15 U/L (ref 9–39)
ATRIAL RATE: 83 BPM
ATRIAL RATE: 91 BPM
BASOPHILS # BLD AUTO: 0.06 X10*3/UL (ref 0–0.1)
BASOPHILS NFR BLD AUTO: 0.8 %
BILIRUB SERPL-MCNC: 0.5 MG/DL (ref 0–1.2)
BNP SERPL-MCNC: 15 PG/ML (ref 0–99)
BUN SERPL-MCNC: 16 MG/DL (ref 6–23)
CALCIUM SERPL-MCNC: 7.5 MG/DL (ref 8.6–10.3)
CARDIAC TROPONIN I PNL SERPL HS: 4 NG/L (ref 0–20)
CARDIAC TROPONIN I PNL SERPL HS: 4 NG/L (ref 0–20)
CHLORIDE SERPL-SCNC: 111 MMOL/L (ref 98–107)
CO2 SERPL-SCNC: 21 MMOL/L (ref 21–32)
CREAT SERPL-MCNC: 0.68 MG/DL (ref 0.5–1.3)
EGFRCR SERPLBLD CKD-EPI 2021: >90 ML/MIN/1.73M*2
EOSINOPHIL # BLD AUTO: 0.42 X10*3/UL (ref 0–0.4)
EOSINOPHIL NFR BLD AUTO: 5.8 %
ERYTHROCYTE [DISTWIDTH] IN BLOOD BY AUTOMATED COUNT: 12.5 % (ref 11.5–14.5)
FLUAV RNA RESP QL NAA+PROBE: NOT DETECTED
FLUBV RNA RESP QL NAA+PROBE: NOT DETECTED
GLUCOSE SERPL-MCNC: 95 MG/DL (ref 74–99)
HCT VFR BLD AUTO: 39 % (ref 41–52)
HGB BLD-MCNC: 12.6 G/DL (ref 13.5–17.5)
IMM GRANULOCYTES # BLD AUTO: 0.03 X10*3/UL (ref 0–0.5)
IMM GRANULOCYTES NFR BLD AUTO: 0.4 % (ref 0–0.9)
LACTATE SERPL-SCNC: 1.3 MMOL/L (ref 0.4–2)
LYMPHOCYTES # BLD AUTO: 2.41 X10*3/UL (ref 0.8–3)
LYMPHOCYTES NFR BLD AUTO: 33.3 %
MAGNESIUM SERPL-MCNC: 1.72 MG/DL (ref 1.6–2.4)
MCH RBC QN AUTO: 29.6 PG (ref 26–34)
MCHC RBC AUTO-ENTMCNC: 32.3 G/DL (ref 32–36)
MCV RBC AUTO: 92 FL (ref 80–100)
MONOCYTES # BLD AUTO: 0.65 X10*3/UL (ref 0.05–0.8)
MONOCYTES NFR BLD AUTO: 9 %
NEUTROPHILS # BLD AUTO: 3.67 X10*3/UL (ref 1.6–5.5)
NEUTROPHILS NFR BLD AUTO: 50.7 %
NRBC BLD-RTO: 0 /100 WBCS (ref 0–0)
P AXIS: 31 DEGREES
P AXIS: 36 DEGREES
P OFFSET: 190 MS
P OFFSET: 202 MS
P ONSET: 142 MS
P ONSET: 150 MS
PLATELET # BLD AUTO: 214 X10*3/UL (ref 150–450)
POTASSIUM SERPL-SCNC: 3.1 MMOL/L (ref 3.5–5.3)
PR INTERVAL: 150 MS
PR INTERVAL: 162 MS
PROT SERPL-MCNC: 6.5 G/DL (ref 6.4–8.2)
Q ONSET: 223 MS
Q ONSET: 225 MS
QRS COUNT: 13 BEATS
QRS COUNT: 15 BEATS
QRS DURATION: 100 MS
QRS DURATION: 94 MS
QT INTERVAL: 360 MS
QT INTERVAL: 362 MS
QTC CALCULATION(BAZETT): 425 MS
QTC CALCULATION(BAZETT): 442 MS
QTC FREDERICIA: 403 MS
QTC FREDERICIA: 414 MS
R AXIS: -16 DEGREES
R AXIS: 12 DEGREES
RBC # BLD AUTO: 4.25 X10*6/UL (ref 4.5–5.9)
SARS-COV-2 RNA RESP QL NAA+PROBE: NOT DETECTED
SODIUM SERPL-SCNC: 141 MMOL/L (ref 136–145)
T AXIS: 1 DEGREES
T AXIS: 20 DEGREES
T OFFSET: 404 MS
T OFFSET: 405 MS
UFH PPP CHRO-ACNC: 0.5 IU/ML
VENTRICULAR RATE: 83 BPM
VENTRICULAR RATE: 91 BPM
WBC # BLD AUTO: 7.2 X10*3/UL (ref 4.4–11.3)

## 2025-01-12 PROCEDURE — 96376 TX/PRO/DX INJ SAME DRUG ADON: CPT | Mod: 59

## 2025-01-12 PROCEDURE — 96375 TX/PRO/DX INJ NEW DRUG ADDON: CPT | Mod: 59

## 2025-01-12 PROCEDURE — 85025 COMPLETE CBC W/AUTO DIFF WBC: CPT | Performed by: PHYSICIAN ASSISTANT

## 2025-01-12 PROCEDURE — 71045 X-RAY EXAM CHEST 1 VIEW: CPT | Mod: FOREIGN READ | Performed by: RADIOLOGY

## 2025-01-12 PROCEDURE — 93005 ELECTROCARDIOGRAM TRACING: CPT

## 2025-01-12 PROCEDURE — 71275 CT ANGIOGRAPHY CHEST: CPT

## 2025-01-12 PROCEDURE — 99222 1ST HOSP IP/OBS MODERATE 55: CPT | Performed by: NURSE PRACTITIONER

## 2025-01-12 PROCEDURE — 36415 COLL VENOUS BLD VENIPUNCTURE: CPT | Performed by: PHYSICIAN ASSISTANT

## 2025-01-12 PROCEDURE — 2500000004 HC RX 250 GENERAL PHARMACY W/ HCPCS (ALT 636 FOR OP/ED): Performed by: PHYSICIAN ASSISTANT

## 2025-01-12 PROCEDURE — 80053 COMPREHEN METABOLIC PANEL: CPT | Performed by: PHYSICIAN ASSISTANT

## 2025-01-12 PROCEDURE — 83880 ASSAY OF NATRIURETIC PEPTIDE: CPT | Performed by: PHYSICIAN ASSISTANT

## 2025-01-12 PROCEDURE — 96365 THER/PROPH/DIAG IV INF INIT: CPT | Mod: 59

## 2025-01-12 PROCEDURE — G0378 HOSPITAL OBSERVATION PER HR: HCPCS

## 2025-01-12 PROCEDURE — 2550000001 HC RX 255 CONTRASTS: Performed by: STUDENT IN AN ORGANIZED HEALTH CARE EDUCATION/TRAINING PROGRAM

## 2025-01-12 PROCEDURE — 96366 THER/PROPH/DIAG IV INF ADDON: CPT

## 2025-01-12 PROCEDURE — 83605 ASSAY OF LACTIC ACID: CPT | Performed by: PHYSICIAN ASSISTANT

## 2025-01-12 PROCEDURE — 2500000002 HC RX 250 W HCPCS SELF ADMINISTERED DRUGS (ALT 637 FOR MEDICARE OP, ALT 636 FOR OP/ED): Performed by: PHYSICIAN ASSISTANT

## 2025-01-12 PROCEDURE — 99291 CRITICAL CARE FIRST HOUR: CPT | Performed by: PHYSICIAN ASSISTANT

## 2025-01-12 PROCEDURE — 83735 ASSAY OF MAGNESIUM: CPT | Performed by: PHYSICIAN ASSISTANT

## 2025-01-12 PROCEDURE — 84484 ASSAY OF TROPONIN QUANT: CPT | Performed by: PHYSICIAN ASSISTANT

## 2025-01-12 PROCEDURE — 85520 HEPARIN ASSAY: CPT | Performed by: HOSPITALIST

## 2025-01-12 PROCEDURE — 2500000001 HC RX 250 WO HCPCS SELF ADMINISTERED DRUGS (ALT 637 FOR MEDICARE OP): Performed by: NURSE PRACTITIONER

## 2025-01-12 PROCEDURE — 71045 X-RAY EXAM CHEST 1 VIEW: CPT

## 2025-01-12 PROCEDURE — 87636 SARSCOV2 & INF A&B AMP PRB: CPT | Performed by: PHYSICIAN ASSISTANT

## 2025-01-12 PROCEDURE — 2500000004 HC RX 250 GENERAL PHARMACY W/ HCPCS (ALT 636 FOR OP/ED): Performed by: STUDENT IN AN ORGANIZED HEALTH CARE EDUCATION/TRAINING PROGRAM

## 2025-01-12 PROCEDURE — 99239 HOSP IP/OBS DSCHRG MGMT >30: CPT

## 2025-01-12 PROCEDURE — 2500000002 HC RX 250 W HCPCS SELF ADMINISTERED DRUGS (ALT 637 FOR MEDICARE OP, ALT 636 FOR OP/ED)

## 2025-01-12 RX ORDER — FENTANYL CITRATE 50 UG/ML
50 INJECTION, SOLUTION INTRAMUSCULAR; INTRAVENOUS ONCE
Status: COMPLETED | OUTPATIENT
Start: 2025-01-12 | End: 2025-01-12

## 2025-01-12 RX ORDER — ACETAMINOPHEN 650 MG/1
650 SUPPOSITORY RECTAL EVERY 4 HOURS PRN
Status: DISCONTINUED | OUTPATIENT
Start: 2025-01-12 | End: 2025-01-12 | Stop reason: HOSPADM

## 2025-01-12 RX ORDER — ONDANSETRON HYDROCHLORIDE 2 MG/ML
4 INJECTION, SOLUTION INTRAVENOUS ONCE
Status: COMPLETED | OUTPATIENT
Start: 2025-01-12 | End: 2025-01-12

## 2025-01-12 RX ORDER — HEPARIN SODIUM 10000 [USP'U]/100ML
0-4500 INJECTION, SOLUTION INTRAVENOUS CONTINUOUS
Status: DISCONTINUED | OUTPATIENT
Start: 2025-01-12 | End: 2025-01-12

## 2025-01-12 RX ORDER — POTASSIUM CHLORIDE 20 MEQ/1
40 TABLET, EXTENDED RELEASE ORAL ONCE
Status: COMPLETED | OUTPATIENT
Start: 2025-01-12 | End: 2025-01-12

## 2025-01-12 RX ORDER — ORPHENADRINE CITRATE 30 MG/ML
60 INJECTION INTRAMUSCULAR; INTRAVENOUS ONCE
Status: COMPLETED | OUTPATIENT
Start: 2025-01-12 | End: 2025-01-12

## 2025-01-12 RX ORDER — PANTOPRAZOLE SODIUM 40 MG/1
40 TABLET, DELAYED RELEASE ORAL DAILY
Status: DISCONTINUED | OUTPATIENT
Start: 2025-01-12 | End: 2025-01-12 | Stop reason: HOSPADM

## 2025-01-12 RX ORDER — ACETAMINOPHEN 325 MG/1
650 TABLET ORAL EVERY 4 HOURS PRN
Status: DISCONTINUED | OUTPATIENT
Start: 2025-01-12 | End: 2025-01-12 | Stop reason: HOSPADM

## 2025-01-12 RX ORDER — ACETAMINOPHEN 160 MG/5ML
650 SOLUTION ORAL EVERY 4 HOURS PRN
Status: DISCONTINUED | OUTPATIENT
Start: 2025-01-12 | End: 2025-01-12 | Stop reason: HOSPADM

## 2025-01-12 RX ORDER — POLYETHYLENE GLYCOL 3350 17 G/17G
17 POWDER, FOR SOLUTION ORAL DAILY PRN
Status: DISCONTINUED | OUTPATIENT
Start: 2025-01-12 | End: 2025-01-12 | Stop reason: HOSPADM

## 2025-01-12 RX ORDER — ACETAMINOPHEN 500 MG
10 TABLET ORAL NIGHTLY PRN
Status: DISCONTINUED | OUTPATIENT
Start: 2025-01-12 | End: 2025-01-12 | Stop reason: HOSPADM

## 2025-01-12 RX ADMIN — RIVAROXABAN 15 MG: 15 TABLET, FILM COATED ORAL at 16:57

## 2025-01-12 RX ADMIN — IOHEXOL 75 ML: 350 INJECTION, SOLUTION INTRAVENOUS at 04:08

## 2025-01-12 RX ADMIN — ONDANSETRON 4 MG: 2 INJECTION INTRAMUSCULAR; INTRAVENOUS at 03:08

## 2025-01-12 RX ADMIN — HEPARIN SODIUM AND DEXTROSE 1800 UNITS/HR: 10000; 5 INJECTION INTRAVENOUS at 06:06

## 2025-01-12 RX ADMIN — FENTANYL CITRATE 50 MCG: 50 INJECTION INTRAMUSCULAR; INTRAVENOUS at 03:08

## 2025-01-12 RX ADMIN — ORPHENADRINE CITRATE 60 MG: 60 INJECTION INTRAMUSCULAR; INTRAVENOUS at 03:08

## 2025-01-12 RX ADMIN — PANTOPRAZOLE SODIUM 40 MG: 40 TABLET, DELAYED RELEASE ORAL at 05:59

## 2025-01-12 RX ADMIN — FENTANYL CITRATE 50 MCG: 50 INJECTION INTRAMUSCULAR; INTRAVENOUS at 08:06

## 2025-01-12 RX ADMIN — POTASSIUM CHLORIDE 40 MEQ: 1500 TABLET, EXTENDED RELEASE ORAL at 04:24

## 2025-01-12 SDOH — SOCIAL STABILITY: SOCIAL INSECURITY: ARE YOU MARRIED, WIDOWED, DIVORCED, SEPARATED, NEVER MARRIED, OR LIVING WITH A PARTNER?: MARRIED

## 2025-01-12 SDOH — ECONOMIC STABILITY: TRANSPORTATION INSECURITY: IN THE PAST 12 MONTHS, HAS LACK OF TRANSPORTATION KEPT YOU FROM MEDICAL APPOINTMENTS OR FROM GETTING MEDICATIONS?: NO

## 2025-01-12 SDOH — SOCIAL STABILITY: SOCIAL INSECURITY: DO YOU FEEL UNSAFE GOING BACK TO THE PLACE WHERE YOU ARE LIVING?: NO

## 2025-01-12 SDOH — HEALTH STABILITY: PHYSICAL HEALTH
HOW OFTEN DO YOU NEED TO HAVE SOMEONE HELP YOU WHEN YOU READ INSTRUCTIONS, PAMPHLETS, OR OTHER WRITTEN MATERIAL FROM YOUR DOCTOR OR PHARMACY?: NEVER

## 2025-01-12 SDOH — HEALTH STABILITY: PHYSICAL HEALTH: ON AVERAGE, HOW MANY MINUTES DO YOU ENGAGE IN EXERCISE AT THIS LEVEL?: 0 MIN

## 2025-01-12 SDOH — SOCIAL STABILITY: SOCIAL INSECURITY
WITHIN THE LAST YEAR, HAVE YOU BEEN KICKED, HIT, SLAPPED, OR OTHERWISE PHYSICALLY HURT BY YOUR PARTNER OR EX-PARTNER?: NO

## 2025-01-12 SDOH — SOCIAL STABILITY: SOCIAL INSECURITY: DO YOU FEEL ANYONE HAS EXPLOITED OR TAKEN ADVANTAGE OF YOU FINANCIALLY OR OF YOUR PERSONAL PROPERTY?: NO

## 2025-01-12 SDOH — SOCIAL STABILITY: SOCIAL NETWORK
IN A TYPICAL WEEK, HOW MANY TIMES DO YOU TALK ON THE PHONE WITH FAMILY, FRIENDS, OR NEIGHBORS?: MORE THAN THREE TIMES A WEEK

## 2025-01-12 SDOH — SOCIAL STABILITY: SOCIAL NETWORK
DO YOU BELONG TO ANY CLUBS OR ORGANIZATIONS SUCH AS CHURCH GROUPS, UNIONS, FRATERNAL OR ATHLETIC GROUPS, OR SCHOOL GROUPS?: NO

## 2025-01-12 SDOH — ECONOMIC STABILITY: INCOME INSECURITY: IN THE PAST 12 MONTHS HAS THE ELECTRIC, GAS, OIL, OR WATER COMPANY THREATENED TO SHUT OFF SERVICES IN YOUR HOME?: NO

## 2025-01-12 SDOH — SOCIAL STABILITY: SOCIAL INSECURITY: WITHIN THE LAST YEAR, HAVE YOU BEEN HUMILIATED OR EMOTIONALLY ABUSED IN OTHER WAYS BY YOUR PARTNER OR EX-PARTNER?: NO

## 2025-01-12 SDOH — SOCIAL STABILITY: SOCIAL INSECURITY
WITHIN THE LAST YEAR, HAVE YOU BEEN RAPED OR FORCED TO HAVE ANY KIND OF SEXUAL ACTIVITY BY YOUR PARTNER OR EX-PARTNER?: NO

## 2025-01-12 SDOH — SOCIAL STABILITY: SOCIAL INSECURITY: WITHIN THE LAST YEAR, HAVE YOU BEEN AFRAID OF YOUR PARTNER OR EX-PARTNER?: NO

## 2025-01-12 SDOH — ECONOMIC STABILITY: HOUSING INSECURITY: AT ANY TIME IN THE PAST 12 MONTHS, WERE YOU HOMELESS OR LIVING IN A SHELTER (INCLUDING NOW)?: NO

## 2025-01-12 SDOH — HEALTH STABILITY: MENTAL HEALTH
DO YOU FEEL STRESS - TENSE, RESTLESS, NERVOUS, OR ANXIOUS, OR UNABLE TO SLEEP AT NIGHT BECAUSE YOUR MIND IS TROUBLED ALL THE TIME - THESE DAYS?: NOT AT ALL

## 2025-01-12 SDOH — SOCIAL STABILITY: SOCIAL INSECURITY: ARE YOU OR HAVE YOU BEEN THREATENED OR ABUSED PHYSICALLY, EMOTIONALLY, OR SEXUALLY BY ANYONE?: NO

## 2025-01-12 SDOH — SOCIAL STABILITY: SOCIAL INSECURITY: ARE THERE ANY APPARENT SIGNS OF INJURIES/BEHAVIORS THAT COULD BE RELATED TO ABUSE/NEGLECT?: NO

## 2025-01-12 SDOH — ECONOMIC STABILITY: FOOD INSECURITY: WITHIN THE PAST 12 MONTHS, YOU WORRIED THAT YOUR FOOD WOULD RUN OUT BEFORE YOU GOT THE MONEY TO BUY MORE.: NEVER TRUE

## 2025-01-12 SDOH — SOCIAL STABILITY: SOCIAL INSECURITY: DOES ANYONE TRY TO KEEP YOU FROM HAVING/CONTACTING OTHER FRIENDS OR DOING THINGS OUTSIDE YOUR HOME?: NO

## 2025-01-12 SDOH — ECONOMIC STABILITY: FOOD INSECURITY: WITHIN THE PAST 12 MONTHS, THE FOOD YOU BOUGHT JUST DIDN'T LAST AND YOU DIDN'T HAVE MONEY TO GET MORE.: NEVER TRUE

## 2025-01-12 SDOH — ECONOMIC STABILITY: HOUSING INSECURITY: IN THE PAST 12 MONTHS, HOW MANY TIMES HAVE YOU MOVED WHERE YOU WERE LIVING?: 1

## 2025-01-12 SDOH — SOCIAL STABILITY: SOCIAL INSECURITY: HAS ANYONE EVER THREATENED TO HURT YOUR FAMILY OR YOUR PETS?: NO

## 2025-01-12 SDOH — SOCIAL STABILITY: SOCIAL NETWORK: HOW OFTEN DO YOU ATTEND CHURCH OR RELIGIOUS SERVICES?: MORE THAN 4 TIMES PER YEAR

## 2025-01-12 SDOH — ECONOMIC STABILITY: HOUSING INSECURITY: IN THE LAST 12 MONTHS, WAS THERE A TIME WHEN YOU WERE NOT ABLE TO PAY THE MORTGAGE OR RENT ON TIME?: NO

## 2025-01-12 SDOH — HEALTH STABILITY: PHYSICAL HEALTH: ON AVERAGE, HOW MANY DAYS PER WEEK DO YOU ENGAGE IN MODERATE TO STRENUOUS EXERCISE (LIKE A BRISK WALK)?: 0 DAYS

## 2025-01-12 SDOH — SOCIAL STABILITY: SOCIAL INSECURITY: HAVE YOU HAD THOUGHTS OF HARMING ANYONE ELSE?: NO

## 2025-01-12 SDOH — SOCIAL STABILITY: SOCIAL INSECURITY: WERE YOU ABLE TO COMPLETE ALL THE BEHAVIORAL HEALTH SCREENINGS?: YES

## 2025-01-12 SDOH — SOCIAL STABILITY: SOCIAL INSECURITY: HAVE YOU HAD ANY THOUGHTS OF HARMING ANYONE ELSE?: NO

## 2025-01-12 SDOH — SOCIAL STABILITY: SOCIAL NETWORK: HOW OFTEN DO YOU GET TOGETHER WITH FRIENDS OR RELATIVES?: MORE THAN THREE TIMES A WEEK

## 2025-01-12 SDOH — SOCIAL STABILITY: SOCIAL INSECURITY: ABUSE: ADULT

## 2025-01-12 SDOH — SOCIAL STABILITY: SOCIAL NETWORK: HOW OFTEN DO YOU ATTEND MEETINGS OF THE CLUBS OR ORGANIZATIONS YOU BELONG TO?: MORE THAN 4 TIMES PER YEAR

## 2025-01-12 SDOH — ECONOMIC STABILITY: FOOD INSECURITY: HOW HARD IS IT FOR YOU TO PAY FOR THE VERY BASICS LIKE FOOD, HOUSING, MEDICAL CARE, AND HEATING?: NOT HARD AT ALL

## 2025-01-12 ASSESSMENT — ACTIVITIES OF DAILY LIVING (ADL)
BATHING: INDEPENDENT
JUDGMENT_ADEQUATE_SAFELY_COMPLETE_DAILY_ACTIVITIES: YES
DRESSING YOURSELF: INDEPENDENT
HEARING - LEFT EAR: FUNCTIONAL
ADEQUATE_TO_COMPLETE_ADL: YES
FEEDING YOURSELF: INDEPENDENT
HEARING - RIGHT EAR: FUNCTIONAL
WALKS IN HOME: INDEPENDENT
LACK_OF_TRANSPORTATION: NO
TOILETING: INDEPENDENT
PATIENT'S MEMORY ADEQUATE TO SAFELY COMPLETE DAILY ACTIVITIES?: YES
LACK_OF_TRANSPORTATION: NO
GROOMING: INDEPENDENT

## 2025-01-12 ASSESSMENT — LIFESTYLE VARIABLES
SUBSTANCE_ABUSE_PAST_12_MONTHS: NO
AUDIT-C TOTAL SCORE: 0
HOW MANY STANDARD DRINKS CONTAINING ALCOHOL DO YOU HAVE ON A TYPICAL DAY: PATIENT DOES NOT DRINK
PRESCIPTION_ABUSE_PAST_12_MONTHS: NO
HOW OFTEN DO YOU HAVE 6 OR MORE DRINKS ON ONE OCCASION: NEVER
HOW OFTEN DO YOU HAVE A DRINK CONTAINING ALCOHOL: NEVER
SKIP TO QUESTIONS 9-10: 1
AUDIT-C TOTAL SCORE: 0

## 2025-01-12 ASSESSMENT — ENCOUNTER SYMPTOMS
FLANK PAIN: 0
CHILLS: 0
FREQUENCY: 0
SHORTNESS OF BREATH: 1
DYSURIA: 0
PALPITATIONS: 0
HEMATURIA: 0
NAUSEA: 0
FEVER: 0
CONSTIPATION: 0
ABDOMINAL PAIN: 0
VOMITING: 0
DIARRHEA: 0

## 2025-01-12 ASSESSMENT — PAIN DESCRIPTION - PAIN TYPE: TYPE: ACUTE PAIN

## 2025-01-12 ASSESSMENT — PAIN SCALES - GENERAL
PAINLEVEL_OUTOF10: 10 - WORST POSSIBLE PAIN
PAINLEVEL_OUTOF10: 10 - WORST POSSIBLE PAIN
PAINLEVEL_OUTOF10: 5 - MODERATE PAIN
PAINLEVEL_OUTOF10: 5 - MODERATE PAIN

## 2025-01-12 ASSESSMENT — PATIENT HEALTH QUESTIONNAIRE - PHQ9
SUM OF ALL RESPONSES TO PHQ9 QUESTIONS 1 & 2: 0
2. FEELING DOWN, DEPRESSED OR HOPELESS: NOT AT ALL
1. LITTLE INTEREST OR PLEASURE IN DOING THINGS: NOT AT ALL

## 2025-01-12 ASSESSMENT — PAIN - FUNCTIONAL ASSESSMENT
PAIN_FUNCTIONAL_ASSESSMENT: 0-10
PAIN_FUNCTIONAL_ASSESSMENT: 0-10

## 2025-01-12 ASSESSMENT — PAIN DESCRIPTION - LOCATION
LOCATION: CHEST

## 2025-01-12 ASSESSMENT — COGNITIVE AND FUNCTIONAL STATUS - GENERAL
DAILY ACTIVITIY SCORE: 24
PATIENT BASELINE BEDBOUND: NO
MOBILITY SCORE: 24

## 2025-01-12 ASSESSMENT — PAIN DESCRIPTION - PROGRESSION: CLINICAL_PROGRESSION: NOT CHANGED

## 2025-01-12 ASSESSMENT — PAIN DESCRIPTION - ORIENTATION
ORIENTATION: RIGHT
ORIENTATION: RIGHT

## 2025-01-12 ASSESSMENT — COLUMBIA-SUICIDE SEVERITY RATING SCALE - C-SSRS
6. HAVE YOU EVER DONE ANYTHING, STARTED TO DO ANYTHING, OR PREPARED TO DO ANYTHING TO END YOUR LIFE?: NO
2. HAVE YOU ACTUALLY HAD ANY THOUGHTS OF KILLING YOURSELF?: NO
1. IN THE PAST MONTH, HAVE YOU WISHED YOU WERE DEAD OR WISHED YOU COULD GO TO SLEEP AND NOT WAKE UP?: NO

## 2025-01-12 ASSESSMENT — PAIN DESCRIPTION - DESCRIPTORS: DESCRIPTORS: SHARP;STABBING

## 2025-01-12 NOTE — NURSING NOTE
Writer removed PIV and telemetry. Writer discussed discharge instructions and answered all questions at bedside. Patient and wife at bedside during education. Patient exhibited no signs of distress. Patient ambulated with wife to parking garage. All belongings gathered and left with patient.

## 2025-01-12 NOTE — DISCHARGE SUMMARY
Discharge Diagnosis  Bilateral PE (pulmonary thromboembolism) (Multi)    Issues Requiring Follow-Up  Follow up with PCP    Discharge Meds     Medication List      START taking these medications     rivaroxaban 2.5 mg tablet; Commonly known as: Xarelto; Take 6 tablets   (15 mg) by mouth 2 times daily (morning and late afternoon) for 21 days,   THEN 8 tablets (20 mg) once daily with breakfast. Take with food..; Start   taking on: January 12, 2025     CONTINUE taking these medications     acetylcysteine 600 mg capsule capsule   allopurinol 300 mg tablet; Commonly known as: Zyloprim; Take 0.5 tablets   (150 mg) by mouth once daily.   cetirizine 10 mg tablet; Commonly known as: ZyrTEC   cholecalciferol 50 mcg (2,000 unit) capsule; Commonly known as: Vitamin   D-3   DIETARY SUPPLEMENT ORAL   fish oil concentrate 120-180 mg capsule; Commonly known as: Omega-3   fluticasone 50 mcg/actuation nasal spray; Commonly known as: Flonase   HYALURONIC ACID (CHOND-COLLGN) ORAL   hydroCHLOROthiazide 12.5 mg tablet; Commonly known as: Microzide; Take 1   tablet (12.5 mg) by mouth once daily.   melatonin 3 mg tablet   multivitamin tablet   potassium citrate CR 15 mEq ER tablet; Commonly known as: Urocit-K-15   tiZANidine 4 mg tablet; Commonly known as: Zanaflex; Take 1 tablet (4   mg) by mouth 2 times a day as needed for muscle spasms.   TYLENOL ORAL     STOP taking these medications     tadalafil 20 mg tablet; Commonly known as: Cialis       Test Results Pending At Discharge  Pending Labs       No current pending labs.            Hospital Course   This is a 70 year old male with PMHx including GERD, DJD, and dyslipidemia who presented earlier today for chest pain and SOB secondary to bilateral PE. CTA chest positive for PE in pulmonary artery segmental branches of the RML, RLL, and LLL. Most likely etiology is recent lumbar laminectomy 7 weeks ago. Patient was initiated on heparin drip and transitioned to PO Xarelto. With treatment,  the patient's symptoms have subsided. Remains stable on RA, no tachycardia. He will be given a prescription for the Xarelto starter pack, free monthly trial pamphlet provided. Sent to the pharmacy with multiple refills. He should resume all other home meds. Recommend follow up with surgeon regarding new anticoagulation use. He should follow up with his PCP in 1 week. The patient will be discharged home today with a healthy at home referral. He is hemodynamically stable at time of discharge.    Plan of care was discussed extensively with patient.  Patient verbalized understanding through teach back method.  All question and concerns addressed upon examination.     Of note, this documentation is completed using the Dragon Dictation system (voice recognition software). There may be spelling and/or grammatical errors that were not corrected prior to final submission.      Pertinent Physical Exam At Time of Discharge  Physical Exam  Constitutional:       Appearance: Normal appearance. He is obese.   HENT:      Head: Normocephalic.      Mouth/Throat:      Mouth: Mucous membranes are moist.   Eyes:      Pupils: Pupils are equal, round, and reactive to light.   Cardiovascular:      Rate and Rhythm: Normal rate and regular rhythm.      Heart sounds: Normal heart sounds, S1 normal and S2 normal.   Pulmonary:      Effort: Pulmonary effort is normal.      Breath sounds: Normal breath sounds.   Abdominal:      General: Bowel sounds are normal.      Palpations: Abdomen is soft.   Musculoskeletal:         General: Normal range of motion.      Cervical back: Neck supple.      Right lower leg: No edema.      Left lower leg: No edema.   Skin:     General: Skin is warm.   Neurological:      Mental Status: He is alert and oriented to person, place, and time.   Psychiatric:         Mood and Affect: Mood normal.         Behavior: Behavior normal.         Outpatient Follow-Up  Future Appointments   Date Time Provider Department Center    3/18/2025  9:30 AM DO [email or web address] [place]   9/11/2025  9:20 AM Mario Durand MD [id number]         Yuly Sultana PA-C  I spent 35 minutes on discharge. Time calculated includes bedside evaluation, counseling, and outpatient care coordination.

## 2025-01-12 NOTE — SIGNIFICANT EVENT
"PULMONARY EMBOLISM RESPONSE TEAM (PERT) NOTE    This note represents a summary of a virtual evaluation by the pulmonary embolism response team requested by Dr. Vasquez.  Suggestions and impression are summarized from the initial virtual encounter and discussion with the referring medical team. These suggestions supplement but should not be a substitute for bedside evaluation and management by the attending of record.     PERT Members on the Call:  Critical Care Attending: Dr. Lamar COTTRELL fellow: Félix Patel    Brief Clinical Summary:  John Garvin is a 70 y.o. male presenting with PE.    71 y/o with a history of   Sudden onset R-sided chest pain and shortness of breath. Recent lumbar fusion. EKG was NSR, HR 83. Bilateral PE on scan.    Trop 4  Lactate, bnp    Not altered    Vital Signs  /74 (BP Location: Right arm, Patient Position: Lying)   Pulse 88   Temp 36.2 °C (97.2 °F) (Temporal)   Resp 14   Ht 1.803 m (5' 11\")   Wt 99.8 kg (220 lb)   SpO2 100%   BMI 30.68 kg/m²      Laboratory  Recent Labs     01/12/25  0431 01/12/25  0304   TROPHS 4 4         PESI Score Iván GOOD Et al. Arch Intern Med. 2010;[id number].           PESI Score:  82, consistent with JLPESIRISK: Class II, or Low risk (1.7-3.5% 30-day mortality risk) PE.    CT Scan reviewed:  Positive for pulmonary embolism found in pulmonary artery segmental  branches in the right middle and lower lobes, and in the left lower  lobe.    An RV/LV ratio of 1 may predict right ventricular dysfunction.    TTE reviewed (if available):  Not available    Venous duplex (if available):  Not available    Contraindications to anticoagulation: none  Contraindications to thrombolytics: recent lumbar fusion    Initial Impressions:  ERS/ESC Pulmonary Embolism Risk Category: intermediate low risk    BNP pending  Trop 4    Initial Suggestions/Plans:  Admit to medicine floors at [place]  Initial therapy suggested: heparin gtt  Additional testing recommended: BNP, " lactate, LE dopplers, TTE  If concerns please reach out to PERT team again    To re conference the PERT team please call the  [facility] at [phone].

## 2025-01-12 NOTE — CARE PLAN
The patient's goals for the shift include get rest    The clinical goals for the shift include Patient will report pain at 5/10 by end of shift.    Over the shift, the patient did make progress toward the following goals.     Problem: Pain - Adult  Goal: Verbalizes/displays adequate comfort level or baseline comfort level  Outcome: Progressing

## 2025-01-12 NOTE — DISCHARGE INSTRUCTIONS
You will be discharged with Xarelto. Take 15mg x 21 days followed by 20mg x 3 months  You will need to follow up with your PCP within 1 week    Thank you for choosing [facility]. It has been a pleasure taking part in your medical care. Please follow up with your primary care provider as instructed. If your symptoms should persist or worsen, please contact your primary care physician, or in the case of an emergency proceed to the nearest Emergency Room for further care. If you have any questions about the care you received, please call [facility] at [phone]. Thank you again!

## 2025-01-12 NOTE — H&P
History Of Present Illness  John Garvin is a 70 y.o. male who presented to the ED with chest pain and shortness of breath. He reports the right-sided chest pain began suddenly a few hours prior to arrival, but notes over the past 2-3 days he has had some dyspnea on exertion. The pain is described as sharp, stabbing, and worse with deep inspiration. He underwent a lumbar laminectomy around 7 weeks ago. He denies any fever, chills, hemoptysis, leg swelling or pain, diaphoresis, or palpitation.      Past Medical History  Past Medical History:   Diagnosis Date    Acute maxillary sinusitis, unspecified 11/27/2019    Acute non-recurrent maxillary sinusitis    Calculus of kidney 03/14/2019    Recurrent kidney stones    Calculus of kidney 03/10/2020    Uric acid kidney stone    Cervicogenic headache 12/22/2020    Headache, cervicogenic    Dorsalgia, unspecified     Upper back pain    Epigastric pain 05/29/2020    Abdominal wall pain in epigastric region    Erectile dysfunction     Ganglion, right hand 01/04/2023    Ganglion, finger of right hand    Insect bite (nonvenomous) of other part of head, initial encounter 09/08/2021    Insect bite of cheek, initial encounter    Pain due to genitourinary prosthetic devices, implants and grafts, initial encounter (CMS-HCC)     Pain due to ureteral stent    Personal history of other diseases of the musculoskeletal system and connective tissue     History of back pain    Personal history of other diseases of urinary system 03/10/2020    History of hematuria    Personal history of other specified conditions 08/20/2021    History of chronic fatigue    Personal history of other specified conditions 02/19/2020    History of left flank pain    Personal history of urinary calculi 06/25/2020    History of renal calculi    Personal history of urinary calculi     History of kidney stones    Sleep apnea        Surgical History  Past Surgical History:   Procedure Laterality Date    KIDNEY STONE  SURGERY  1985 / 2021    OTHER SURGICAL HISTORY  03/14/2019    Lithotripsy    OTHER SURGICAL HISTORY  03/14/2019    Nasal septoplasty    OTHER SURGICAL HISTORY  12/14/2022    Ganglion cyst excision    OTHER SURGICAL HISTORY  06/30/2022    Percutaneous nephrolithotomy    OTHER SURGICAL HISTORY  07/16/2021    Kidney surgery    OTHER SURGICAL HISTORY  05/20/2019    Partial atrial septal defect repair    OTHER SURGICAL HISTORY  05/20/2019    Lithotripsy        Social History  He reports that he has never smoked. He has been exposed to tobacco smoke. He has never used smokeless tobacco. He reports that he does not drink alcohol and does not use drugs.    Family History  Family History   Problem Relation Name Age of Onset    Lung cancer Father Jovany Garvin     Breast cancer Father Jovany Garvin     Other (cardiac disorder) Mother Fabiola Garvin     Heart disease Mother Fabiola Garvin     Breast cancer Sister      Other (cardiac disorder) Brother      Other (bladder cancer) Brother          Allergies  Adhesive, Mold, Morphine, and Yeast    Review of Systems   Constitutional:  Negative for chills and fever.   Respiratory:  Positive for shortness of breath.    Cardiovascular:  Positive for chest pain. Negative for palpitations.   Gastrointestinal:  Negative for abdominal pain, constipation, diarrhea, nausea and vomiting.   Genitourinary:  Negative for dysuria, flank pain, frequency, hematuria and urgency.   All other systems reviewed and are negative.       Physical Exam  Vitals reviewed.   HENT:      Head: Normocephalic and atraumatic.   Cardiovascular:      Rate and Rhythm: Normal rate and regular rhythm.      Heart sounds: Normal heart sounds.   Pulmonary:      Effort: Pulmonary effort is normal.      Breath sounds: Normal air entry.   Abdominal:      General: Bowel sounds are normal.      Palpations: Abdomen is soft.      Tenderness: There is no abdominal tenderness.   Musculoskeletal:         General: No deformity.  "  Skin:     General: Skin is warm and dry.   Neurological:      General: No focal deficit present.      Mental Status: He is alert and oriented to person, place, and time.   Psychiatric:         Mood and Affect: Mood normal.         Behavior: Behavior normal.          Last Recorded Vitals  Blood pressure 150/74, pulse 88, temperature 36.2 °C (97.2 °F), temperature source Temporal, resp. rate 14, height 1.803 m (5' 11\"), weight 99.8 kg (220 lb), SpO2 100%.    Relevant Results      Lab Results   Component Value Date    WBC 7.2 01/12/2025    HGB 12.6 (L) 01/12/2025    HCT 39.0 (L) 01/12/2025    MCV 92 01/12/2025     01/12/2025     Lab Results   Component Value Date    GLUCOSE 95 01/12/2025    CALCIUM 7.5 (L) 01/12/2025     01/12/2025    K 3.1 (L) 01/12/2025    CO2 21 01/12/2025     (H) 01/12/2025    BUN 16 01/12/2025    CREATININE 0.68 01/12/2025     CT angio chest for pulmonary embolism  Addendum: ADDENDUM: The study's critical findings were discussed by phone with   Dr. Sergei Beatty, who acknowledged the findings, at 04:39.   Signed by Herbert Arzate MD  Narrative: STUDY:  CT Angiogram of the Chest; 1/12/2025 4:12 AM  INDICATION:  Recent spinal surgery.  Sudden onset of right-sided chest pain with  shortness of breath.  Evaluate for pulmonary embolism.  COMPARISON:  CXR 1/12/2025.  CT AP 2/28/2020.  ACCESSION NUMBER(S):  [id number]  ORDERING CLINICIAN:  SERGEI BEATTY  TECHNIQUE:  CTA of the chest was performed with intravenous contrast.   Images are reviewed and processed at a workstation according to the CT  angiogram protocol with 3-D and/or MIP post processing imaging  generated.  Omnipaque 350 75 mL was administered intravenously.  Automated mA/kV exposure control was utilized and patient examination  was performed in strict accordance with principles of ALARA.  FINDINGS:  There are acute pulmonary arterial filling defects in right middle  lobe and right lower lobe segmental branches of " the pulmonary  arteries.  There are also acute filling defects in the left lower lobe  segmental pulmonary arteries.  The RV/LV ratio is 1.  The thoracic  aorta is normal in course and caliber without dissection or aneurysm.  The heart is normal in size without pericardial effusion.  Thoracic  lymph nodes are not enlarged.  There is no pleural effusion, pleural thickening, or pneumothorax.   The airways are patent.  Lungs are clear without consolidation, interstitial disease, or  suspicious nodules.  Upper abdomen demonstrates no acute pathology.  There are no acute fractures.  No suspicious bony lesions.  Impression: Positive for pulmonary embolism found in pulmonary artery segmental  branches in the right middle and lower lobes, and in the left lower  lobe.  An RV/LV ratio of 1 may predict right ventricular dysfunction.  Signed by Herbert Arzate MD  XR chest 1 view  Narrative: STUDY:  Chest Radiograph;  1/12/2025 at 3:14 AM.  INDICATION:  Chest pain.  COMPARISON:  None available.  ACCESSION NUMBER(S):  [id number]  ORDERING CLINICIAN:  LALITHA BEATTY  TECHNIQUE:  Frontal chest was obtained at 03:14 hours.  FINDINGS:  CARDIOMEDIASTINAL SILHOUETTE:  Cardiomediastinal silhouette is normal in size and configuration.     LUNGS:  Lungs are clear.     ABDOMEN:  No remarkable upper abdominal findings.     BONES:  No acute osseous changes.  Impression: No acute pulmonary abnormality.  Signed by Alex Gordon MD    ED Medication Administration from 01/12/2025 0228 to 01/12/2025 0522         Date/Time Order Dose Route Action Action by     01/12/2025 0308 EST fentaNYL PF (Sublimaze) injection 50 mcg 50 mcg intravenous Given Velten,      01/12/2025 0308 EST ondansetron (Zofran) injection 4 mg 4 mg intravenous Given Velten, M     01/12/2025 0308 EST orphenadrine (Norflex) injection 60 mg 60 mg intravenous Given Velten, M     01/12/2025 0408 EST iohexol (OMNIPaque) 350 mg iodine/mL solution 75 mL 75 mL intravenous Given  ALBERTINA Santamaria     01/12/2025 0424 EST potassium chloride CR (Klor-Con M20) ER tablet 40 mEq 40 mEq oral Given PANCHO Huizar               Assessment/Plan   Assessment & Plan  PE (pulmonary thromboembolism) (Multi)      #Bilateral PE  -Likely provoked 2/2 recent surgical procedure  -RV/LV ratio of 1, possible RV dysfunction  -PERT team contacted by ED  -Trops -ve x2  -No hypoxia  -Heparin gtt started in ED, continue  -Telemetry  -PPI for gi ppx    #r/o hypokalemia  #r/o hyperchloremia  #r/o hypocalcemia  -Suspect contaminated sample with saline flush  -PO K+ given in ED  -Repeat BMP             Mack Vasquez, VISHNU-CNP

## 2025-01-12 NOTE — ED PROVIDER NOTES
HPI   Chief Complaint   Patient presents with    Chest Pain     Right sided chest pain since this morning with shortness of breath       Patient is a 70-year-old male who presents emergency room with chief plaint of sudden onset of right-sided chest pain that started a couple of hours ago while at home.  Patient states that before going to bed he did feel some discomfort to the right side of his chest but it slowly went away.  2 hours ago it returned suddenly and he is having persistent sharp stabbing pain with shortness of breath.  The pain is constant worse with deep inspiration.  He denies any fevers, chills, cough or hemoptysis.  Patient states he recently underwent lumbar laminectomy.  His wife did give him aspirin prior to arrival.  He denies any fevers, chills, diaphoresis, dizziness or near syncope.  The pain is localized just under the right side of his breast and the radiation to his back.  He does have a past medical history of cervicalgia, chronic thoracic back pain, chronic midline back pain, chronic pansinusitis, DJD of the cervical thoracic spine, ED, GERD, gout, nasal polyps, segmental and somatic dysfunction cervical, lumbar, pelvic, thoracic and head region, sleep apnea, kidney stone, dyslipidemia.  Rates his pain a 10 out of a 10 is described as sharp and stabbing.  The patient and his wife are the primary historians      History provided by:  Patient, spouse and medical records          Patient History   Past Medical History:   Diagnosis Date    Acute maxillary sinusitis, unspecified 11/27/2019    Acute non-recurrent maxillary sinusitis    Calculus of kidney 03/14/2019    Recurrent kidney stones    Calculus of kidney 03/10/2020    Uric acid kidney stone    Cervicogenic headache 12/22/2020    Headache, cervicogenic    Dorsalgia, unspecified     Upper back pain    Epigastric pain 05/29/2020    Abdominal wall pain in epigastric region    Erectile dysfunction     Ganglion, right hand 01/04/2023     Ganglion, finger of right hand    Insect bite (nonvenomous) of other part of head, initial encounter 09/08/2021    Insect bite of cheek, initial encounter    Pain due to genitourinary prosthetic devices, implants and grafts, initial encounter (CMS-formerly [facility])     Pain due to ureteral stent    Personal history of other diseases of the musculoskeletal system and connective tissue     History of back pain    Personal history of other diseases of urinary system 03/10/2020    History of hematuria    Personal history of other specified conditions 08/20/2021    History of chronic fatigue    Personal history of other specified conditions 02/19/2020    History of left flank pain    Personal history of urinary calculi 06/25/2020    History of renal calculi    Personal history of urinary calculi     History of kidney stones    Sleep apnea      Past Surgical History:   Procedure Laterality Date    KIDNEY STONE SURGERY  1985 / 2021    OTHER SURGICAL HISTORY  03/14/2019    Lithotripsy    OTHER SURGICAL HISTORY  03/14/2019    Nasal septoplasty    OTHER SURGICAL HISTORY  12/14/2022    Ganglion cyst excision    OTHER SURGICAL HISTORY  06/30/2022    Percutaneous nephrolithotomy    OTHER SURGICAL HISTORY  07/16/2021    Kidney surgery    OTHER SURGICAL HISTORY  05/20/2019    Partial atrial septal defect repair    OTHER SURGICAL HISTORY  05/20/2019    Lithotripsy     Family History   Problem Relation Name Age of Onset    Lung cancer Father Jovany Garvin     Breast cancer Father Jovany Garvin     Other (cardiac disorder) Mother Fabiola Garvin     Heart disease Mother Fabiola Garvin     Breast cancer Sister      Other (cardiac disorder) Brother      Other (bladder cancer) Brother       Social History     Tobacco Use    Smoking status: Never     Passive exposure: Past    Smokeless tobacco: Never   Substance Use Topics    Alcohol use: Never    Drug use: Never       Physical Exam   ED Triage Vitals [01/12/25 0233]   Temperature Heart Rate  Respirations BP   36.2 °C (97.2 °F) 95 (!) 22 (!) 170/91      Pulse Ox Temp Source Heart Rate Source Patient Position   96 % Temporal Monitor Sitting      BP Location FiO2 (%)     Right arm --       Physical Exam  Vitals and nursing note reviewed.   Constitutional:       General: He is awake. He is in acute distress.      Appearance: Normal appearance. He is well-developed, well-groomed and overweight. He is not ill-appearing, toxic-appearing or diaphoretic.      Comments: Temperature 36.2, heart rate 95, respirations 22, blood pressure 170/91, pulse ox is 96% on room air initial EKG interpreted by me shows a normal sinus rhythm with an incomplete right bundle branch block rate 91, , QRS was 94,  QTc was 442 this is interpreted by me at 0230 hrs.   HENT:      Head: Normocephalic and atraumatic.      Right Ear: Tympanic membrane, ear canal and external ear normal.      Left Ear: Tympanic membrane, ear canal and external ear normal.      Nose: Nose normal.      Mouth/Throat:      Mouth: Mucous membranes are moist.      Pharynx: Oropharynx is clear.   Eyes:      Extraocular Movements: Extraocular movements intact.      Conjunctiva/sclera: Conjunctivae normal.      Pupils: Pupils are equal, round, and reactive to light.   Cardiovascular:      Rate and Rhythm: Normal rate and regular rhythm.      Pulses: Normal pulses.      Heart sounds: Normal heart sounds.   Pulmonary:      Effort: Pulmonary effort is normal. Tachypnea present.      Breath sounds: Examination of the right-upper field reveals decreased breath sounds. Examination of the right-middle field reveals decreased breath sounds. Examination of the right-lower field reveals decreased breath sounds. Decreased breath sounds present. No wheezing, rhonchi or rales.   Chest:      Chest wall: Tenderness present.   Abdominal:      General: Abdomen is flat. Bowel sounds are normal.      Palpations: Abdomen is soft. There is no mass.      Tenderness: There is  no abdominal tenderness. There is no guarding.   Musculoskeletal:         General: No swelling or tenderness. Normal range of motion.      Cervical back: Normal range of motion and neck supple.      Right lower leg: No edema.      Left lower leg: No edema.   Skin:     General: Skin is warm and dry.      Capillary Refill: Capillary refill takes less than 2 seconds.      Findings: No rash.   Neurological:      General: No focal deficit present.      Mental Status: He is alert and oriented to person, place, and time. Mental status is at baseline.   Psychiatric:         Mood and Affect: Mood normal.         Behavior: Behavior normal. Behavior is cooperative.         Thought Content: Thought content normal.         Judgment: Judgment normal.           ED Course & MDM   Diagnoses as of 01/12/25 0453   Bilateral pulmonary embolism (Multi)                 No data recorded     Nasreen Coma Scale Score: 15 (01/12/25 0233 : Carolyn Rosas RN)                           Medical Decision Making  Temp 36 2 heart rate 95 respirations 22 blood pressure 170/91 pulse ox was 96% on room air  Patient was medicated with fentanyl 50 mcg IV push, Zofran 4 mg IV push and Norflex 60 mg IV push.  0300 hrs. rounded on the patient he is resting comfortably.  /81 heart rate 86 respirations 18 pulse ox is 98% on room air still complaining of pain.    White count 7.2, hemoglobin 12.6, hematocrit 39, platelet count 214, general chemistry potassium 3.1 chloride 111 otherwise unremarkable, he was given 40 mg of potassium orally, calcium 7.5, mag 1.72, flu negative COVID-negative troponin was 4.  Patient's portable chest x-ray shows no evidence of acute cardiopulmonary abnormality.    CT angio of the chest shows positive for pulm embolism found in the pulmonary artery segmental branches in the right middle and lower lobes and in the left lower lobe, and RV LV ratio of 1 may be due to right ventricular dysfunction.  EKG inter by me at 0430 hrs.  normal sinus rhythm rate 83 , QRS is 100,  QTc was 425 no STEMI    0430 hrs. rounded on the patient and updated the patient on results of the lab work.  Awaiting results of the CT scan.  Repeat blood pressure 150/74 heart rate 88 respirations 14 pulse ox is 100 send room air patient is currently comfortable pain is a 1 out of 10.  0445 hrs. patient was updated on results of the CT scan informing him of the bilateral pulmonary emboli..  I have paged out to the PERT team  0523: Spoke with MICU fellow Dr. Patel who reviewed the patient's workup, he will convene with the PERT team recommends starting heparin.  0536: Spoke with Dr. Patel and he states the patient can stay here under medicine.  He does recommend duplex ultrasounds of both lower extremities and monitor the patient's BNP and lactic.  If there is any concerns feel free to contact the PERT team.  Patient was admitted to the hospitalist service under Dr. Amato        Procedure  Critical Care    Performed by: Sergei Castanon PA-C  Authorized by: Man Puente DO    Critical care provider statement:     Critical care time (minutes):  38    Critical care time was exclusive of:  Separately billable procedures and treating other patients    Critical care was necessary to treat or prevent imminent or life-threatening deterioration of the following conditions: Bilateral pulmonary embolism.    Critical care was time spent personally by me on the following activities:  Development of treatment plan with patient or surrogate, discussions with consultants, evaluation of patient's response to treatment, examination of patient, obtaining history from patient or surrogate, ordering and review of laboratory studies, ordering and review of radiographic studies, pulse oximetry, re-evaluation of patient's condition, review of old charts and ordering and performing treatments and interventions    Care discussed with: admitting provider         Sergei LEIVA  KRISTEN Castanon  01/12/25 0538

## 2025-01-12 NOTE — NURSING NOTE
Patient arrived on floor with telemetry. Patient ambulated to bathroom with assistance due to heparin gtt. Patient declined wearing a hospital gown. Bed at lowest position, call light within reach, 2 side rails up, bed locked, patient belongings within reach, urinal given to patient. Patient exhibited no signs of distress. Patient belongings documented and labeled.

## 2025-01-13 ENCOUNTER — PATIENT OUTREACH (OUTPATIENT)
Dept: PRIMARY CARE | Facility: CLINIC | Age: 70
End: 2025-01-13
Payer: MEDICARE

## 2025-01-13 NOTE — PROGRESS NOTES
Discharge facility: [facility]    Discharge diagnosis: Bilateral PE (pulmonary thromboembolism)     Issues Requiring Follow-Up  Follow up with PCP    Admission date: 1/12/25  Discharge date: 1/12/25   ED Observation    PCP Appointment Date: 1/17/25  Specialist Appointment Date: 2/18/25 Spine Surg  Hospital Encounter and Summary: Linked    See Discharge assessment below for further details    Medications  Medications reviewed with patient/caregiver?: Yes (1/13/2025 10:14 AM)  Is the patient having any side effects they believe may be caused by any medication additions or changes?: No (1/13/2025 10:14 AM)  Does the patient have all medications ordered at discharge?: No (1/13/2025 10:14 AM)  What is keeping the patient from filling the prescriptions?: -- (Planning to  this morning) (1/13/2025 10:14 AM)  Care Management Interventions: Provided patient education (1/13/2025 10:14 AM)  Prescription Comments: Prescription sent for rivaroxaban 2.5 mg tablet (1/13/2025 10:14 AM)  Is the patient taking all medications as directed (includes completed medication regime)?: No (Planning to start this morning after obtaining from pharmacy) (1/13/2025 10:14 AM)  What is preventing the patient from taking all medications as directed?: Other (Comment) (Needs to obtain from pharmacy) (1/13/2025 10:14 AM)  Care Management Interventions: Provided patient education (1/13/2025 10:14 AM)  Medication Comments: Instructed rivaroxaban 2.5 mg tablet  Commonly known as: Xarelto  Start taking on: January 12, 2025  Take 6 tablets (15 mg) by mouth 2 times daily  (morning and late afternoon) for 21 days, THEN 8  tablets (20 mg) once daily with breakfast (1/13/2025 10:14 AM)  Follow Up Tasks: -- (n/a) (1/13/2025 10:14 AM)    Appointments  Does the patient have a primary care provider?: Yes (1/13/2025 10:14 AM)  Care Management Interventions: Verified appointment date/time/provider (1/17/25) (1/13/2025 10:14 AM)  Has the patient  kept scheduled appointments due by today?: Yes (1/13/2025 10:14 AM)  Care Management Interventions: Educated on importance of keeping appointment; Advised patient to keep appointment (1/13/2025 10:14 AM)  Follow Up Tasks: -- (n/a) (1/13/2025 10:14 AM)    Self Management  What is the home health agency?: n/a (1/13/2025 10:14 AM)  Has home health visited the patient within 72 hours of discharge?: Not applicable (1/13/2025 10:14 AM)  Home Health Interventions: -- (n/a) (1/13/2025 10:14 AM)  What Durable Medical Equipment (DME) was ordered?: n/a (1/13/2025 10:14 AM)  Has all Durable Medical Equipment (DME) been delivered?: -- (n/a) (1/13/2025 10:14 AM)  DME Interventions: -- (n/a) (1/13/2025 10:14 AM)  Care Management Interventions: Notified PCP/provider (1/13/2025 10:14 AM)  Follow Up Tasks: -- (n/a) (1/13/2025 10:14 AM)    Patient Teaching  Does the patient have access to their discharge instructions?: Yes (1/13/2025 10:14 AM)  Care Management Interventions: Reviewed instructions with patient (1/13/2025 10:14 AM)  What is the patient's perception of their health status since discharge?: Improving (1/13/2025 10:14 AM)  Is the patient/caregiver able to teach back the hierarchy of who to call/visit for symptoms/problems? PCP, Specialist, Home Health nurse, Urgent Care, ED, 911: Yes (1/13/2025 10:14 AM)  Patient/Caregiver Education Comments: Instructed on hospital discharge instructions. Instructed on new and changed medications. Instructed if increased shortness of breath or chest pains to call 911. Provided my contact information and encouraged to call with any questions (1/13/2025 10:14 AM)    Wrap Up  Wrap Up Additional Comments: SPoke with patient and his wife. Patient states he currently has no pain in his chest. he continues with some baack pain from surgery. He was provided voucher for free 30 day of Xarelto. he is going to pharmacy this morning to obtain. Encouraged him to follow up with insurance company  regarding cost for future refills. Discussed possibly contacting  pharmacy if cost is too much in the future or alternative treatments but to not go without it. Discussed bleeding precautions.He verbalized understanding and will call with any questions. (1/13/2025 10:14 AM)

## 2025-01-15 ENCOUNTER — TELEMEDICINE (OUTPATIENT)
Dept: PHARMACY | Facility: HOSPITAL | Age: 70
End: 2025-01-15
Payer: MEDICARE

## 2025-01-15 ENCOUNTER — PATIENT OUTREACH (OUTPATIENT)
Dept: CARE COORDINATION | Age: 70
End: 2025-01-15
Payer: MEDICARE

## 2025-01-15 ENCOUNTER — PATIENT OUTREACH (OUTPATIENT)
Dept: CARE COORDINATION | Age: 70
End: 2025-01-15

## 2025-01-15 DIAGNOSIS — I26.99 PE (PULMONARY THROMBOEMBOLISM) (MULTI): ICD-10-CM

## 2025-01-15 DIAGNOSIS — I26.99 PE (PULMONARY THROMBOEMBOLISM) (MULTI): Primary | ICD-10-CM

## 2025-01-15 SDOH — ECONOMIC STABILITY: TRANSPORTATION INSECURITY: IN THE PAST 12 MONTHS, HAS LACK OF TRANSPORTATION KEPT YOU FROM MEDICAL APPOINTMENTS OR FROM GETTING MEDICATIONS?: NO

## 2025-01-15 SDOH — ECONOMIC STABILITY: FOOD INSECURITY: WITHIN THE PAST 12 MONTHS, YOU WORRIED THAT YOUR FOOD WOULD RUN OUT BEFORE YOU GOT THE MONEY TO BUY MORE.: NEVER TRUE

## 2025-01-15 SDOH — ECONOMIC STABILITY: FOOD INSECURITY: WITHIN THE PAST 12 MONTHS, THE FOOD YOU BOUGHT JUST DIDN'T LAST AND YOU DIDN'T HAVE MONEY TO GET MORE.: NEVER TRUE

## 2025-01-15 SDOH — ECONOMIC STABILITY: INCOME INSECURITY: IN THE PAST 12 MONTHS HAS THE ELECTRIC, GAS, OIL, OR WATER COMPANY THREATENED TO SHUT OFF SERVICES IN YOUR HOME?: NO

## 2025-01-15 SDOH — HEALTH STABILITY: PHYSICAL HEALTH: ON AVERAGE, HOW MANY DAYS PER WEEK DO YOU ENGAGE IN MODERATE TO STRENUOUS EXERCISE (LIKE A BRISK WALK)?: 0 DAYS

## 2025-01-15 SDOH — HEALTH STABILITY: MENTAL HEALTH: HOW OFTEN DO YOU HAVE A DRINK CONTAINING ALCOHOL?: NEVER

## 2025-01-15 SDOH — HEALTH STABILITY: MENTAL HEALTH: HOW OFTEN DO YOU HAVE SIX OR MORE DRINKS ON ONE OCCASION?: NEVER

## 2025-01-15 SDOH — ECONOMIC STABILITY: HOUSING INSECURITY: IN THE LAST 12 MONTHS, WAS THERE A TIME WHEN YOU WERE NOT ABLE TO PAY THE MORTGAGE OR RENT ON TIME?: NO

## 2025-01-15 SDOH — SOCIAL STABILITY: SOCIAL INSECURITY: ARE YOU MARRIED, WIDOWED, DIVORCED, SEPARATED, NEVER MARRIED, OR LIVING WITH A PARTNER?: MARRIED

## 2025-01-15 SDOH — SOCIAL STABILITY: SOCIAL INSECURITY: WITHIN THE LAST YEAR, HAVE YOU BEEN AFRAID OF YOUR PARTNER OR EX-PARTNER?: NO

## 2025-01-15 SDOH — SOCIAL STABILITY: SOCIAL NETWORK: HOW OFTEN DO YOU ATTEND CHURCH OR RELIGIOUS SERVICES?: MORE THAN 4 TIMES PER YEAR

## 2025-01-15 SDOH — SOCIAL STABILITY: SOCIAL NETWORK
DO YOU BELONG TO ANY CLUBS OR ORGANIZATIONS SUCH AS CHURCH GROUPS, UNIONS, FRATERNAL OR ATHLETIC GROUPS, OR SCHOOL GROUPS?: YES

## 2025-01-15 SDOH — ECONOMIC STABILITY: HOUSING INSECURITY: AT ANY TIME IN THE PAST 12 MONTHS, WERE YOU HOMELESS OR LIVING IN A SHELTER (INCLUDING NOW)?: NO

## 2025-01-15 SDOH — SOCIAL STABILITY: SOCIAL NETWORK: HOW OFTEN DO YOU GET TOGETHER WITH FRIENDS OR RELATIVES?: MORE THAN THREE TIMES A WEEK

## 2025-01-15 SDOH — SOCIAL STABILITY: SOCIAL NETWORK: HOW OFTEN DO YOU ATTEND MEETINGS OF THE CLUBS OR ORGANIZATIONS YOU BELONG TO?: MORE THAN 4 TIMES PER YEAR

## 2025-01-15 SDOH — ECONOMIC STABILITY: FOOD INSECURITY: HOW HARD IS IT FOR YOU TO PAY FOR THE VERY BASICS LIKE FOOD, HOUSING, MEDICAL CARE, AND HEATING?: NOT HARD AT ALL

## 2025-01-15 SDOH — SOCIAL STABILITY: SOCIAL INSECURITY: WITHIN THE LAST YEAR, HAVE YOU BEEN HUMILIATED OR EMOTIONALLY ABUSED IN OTHER WAYS BY YOUR PARTNER OR EX-PARTNER?: NO

## 2025-01-15 SDOH — HEALTH STABILITY: MENTAL HEALTH: HOW MANY DRINKS CONTAINING ALCOHOL DO YOU HAVE ON A TYPICAL DAY WHEN YOU ARE DRINKING?: PATIENT DOES NOT DRINK

## 2025-01-15 SDOH — ECONOMIC STABILITY: HOUSING INSECURITY: IN THE PAST 12 MONTHS, HOW MANY TIMES HAVE YOU MOVED WHERE YOU WERE LIVING?: 0

## 2025-01-15 SDOH — HEALTH STABILITY: PHYSICAL HEALTH: ON AVERAGE, HOW MANY MINUTES DO YOU ENGAGE IN EXERCISE AT THIS LEVEL?: 0 MIN

## 2025-01-15 ASSESSMENT — LIFESTYLE VARIABLES
SKIP TO QUESTIONS 9-10: 1
AUDIT-C TOTAL SCORE: 0

## 2025-01-15 ASSESSMENT — ACTIVITIES OF DAILY LIVING (ADL): LACK_OF_TRANSPORTATION: NO

## 2025-01-15 NOTE — PROGRESS NOTES
"Weekly HHVC with Dr Valdes, Ulysses, Pt, his wife and myself. Pt has no pain or SOB at all now. Ulysses has received pts SS benefit info and putting paperwork through now for xarelto. Verified pt has been on xarelto since DC home. Med Rec reviewed. Pt asking when he may be off xarelto? Advised usually 3-6 months but pt will follow up with PCP. Pt has noticed he still has some residual pain to leg since back surgery, and not swelling.... Pts wife concerned for leg clots?? Advised pt on blood thinner now, and really no symptoms at this time. Pt advised he may go back to using recumbent bike from \"PCP\" stand point- may resume prior activities per pts comfort level. Pt also requesting weekly calls vs daily... Dr Valdes and Ulysses all agree for weekly calls. Next [facility] 1/22 @ 1500. May graduate next week if PAP goes through and still still doing well.   "

## 2025-01-15 NOTE — PROGRESS NOTES
[facility]    John Garvin is a 70 y.o. male was referred to Clinical Pharmacy Team to complete a post-discharge medication optimization and monitoring visit.  The patient was referred for multiple concerns and to help with cost of Xarelto while in [facility]. Today was our initial visit.     Admission Date: 1/12  Discharge Date: 1/12    Referring Provider: Chanelle Valdes MD  PCP: Alexander Garza, DO - next visit: 1/17      Subjective   Allergies   Allergen Reactions    Adhesive Hives     hives for one week. :    Mold Unknown    Morphine Hives     Very Severe Reaction: Swelling    Yeast Other       [id number] - [place] - [address] [facility]  [id number] [address] [address]  Phone: [phone] Fax: [phone]      Medication System Management:  Affordability/Accessibility: try to enroll into PAP  Adherence/Organization: no issues       Social History     Social History Narrative    Not on file        Notable Medication changes following discharge:  Start: xarelto  Stop: none  Change: none    HPI      Review of Systems        Objective     There were no vitals taken for this visit.   BP Readings from Last 4 Encounters:   01/12/25 139/72   09/16/24 126/70   09/12/24 137/80   08/27/24 163/84      There were no vitals filed for this visit.     LAB  Lab Results   Component Value Date    BILITOT 0.5 01/12/2025    CALCIUM 7.5 (L) 01/12/2025    CO2 21 01/12/2025     (H) 01/12/2025    CREATININE 0.68 01/12/2025    GLUCOSE 95 01/12/2025    ALKPHOS 80 01/12/2025    K 3.1 (L) 01/12/2025    PROT 6.5 01/12/2025     01/12/2025    AST 15 01/12/2025    ALT 12 01/12/2025    BUN 16 01/12/2025    ANIONGAP 12 01/12/2025    MG 1.72 01/12/2025    ALBUMIN 3.7 01/12/2025    GFRMALE >90 08/24/2023     Lab Results   Component Value Date    TRIG 101 10/20/2020    CHOL 178 10/20/2020    HDL 43.0 10/20/2020     Lab Results   Component Value Date    HGBA1C 6.0 10/20/2020         Current Outpatient  Medications   Medication Instructions    acetaminophen (TYLENOL ORAL) 650 mg, 3 times daily    acetylcysteine 600 mg capsule Take by mouth.    allopurinol (ZYLOPRIM) 150 mg, oral, Daily    cetirizine (ZyrTEC) 10 mg tablet 1 tablet, Daily    cholecalciferol (Vitamin D-3) 50 mcg (2,000 unit) capsule 1 capsule, Daily    DIETARY SUPPLEMENT ORAL Take by mouth. QUERCITIN POWD    fish oil concentrate (Omega-3) 120-180 mg capsule 1 tablet, Daily    fluticasone (Flonase) 50 mcg/actuation nasal spray 1 spray, Daily    hyalur ac/chond sul/colg II/AA (HYALURONIC ACID, CHOND-COLLGN, ORAL) Take by mouth.    hydroCHLOROthiazide (MICROZIDE) 12.5 mg, oral, Daily    melatonin 3 mg tablet 1 tablet, Nightly    multivitamin tablet 1 tablet, Daily    potassium citrate CR (Urocit-K-15) 15 mEq ER tablet 1 tablet, Daily    rivaroxaban (Xarelto) 2.5 mg tablet Take 6 tablets (15 mg) by mouth 2 times daily (morning and late afternoon) for 21 days, THEN 8 tablets (20 mg) once daily with breakfast. Take with food..    tiZANidine (ZANAFLEX) 4 mg, oral, 2 times daily PRN        HISTORICAL PHARMACOTHERAPY  N/a    DRUG INTERACTIONS  None at time of review      Assessment/Plan   Problem List Items Addressed This Visit       PE (pulmonary thromboembolism) (Multi)       -no chest pain or SOB any more   -actually starting to feel back to normal   -wears compression stockings daily   -was concerned with clotting in his legs too but discussed he most likely does not and the xarelto will help regardless   -also discussed him being able to getting back to some physical activity and able to do so without exerting himself too much       Medications Changes/Concerns:  -d/c meds:  1. xarelto starter pack --> 15mg twice daily x 21 days then will continue 20mg daily   -sent to [facility]  -confirmed he was able to  the starter pack with free trial for first 30 days   -discussed most likely will need to be on anticoagulation for at least 6 months       Refills  Needed:  -none needed today       Plan/To Do:  -would only like once weekly visits which is fine with the team   -seeing pcp Friday   -call nursing if any questions or concerns before next visit       [facility] PAP Status:  -confirmed that him and his spouse only receive SS for annual income now   -they already sent me copies of both of their benefit letters so will submit his info today for possible enrollment       Time Spent with Patient and [facility] Team - Dr. Valdes and Mica RN via phone call: 20 minutes      Follow Up: 1 week     Continue all meds under the continuation of care with the referring provider and clinical pharmacy team.    Ulysses Bruno, PharmD     Verbal consent to manage patient's drug therapy was obtained from the patient. They were informed they may decline to participate or withdraw from participation in pharmacy services at any time.

## 2025-01-15 NOTE — PROGRESS NOTES
Pt called to enroll in [facility]. Pt lives at home with wife. Recent hospital stay and diagnosed with PE. Pt sent home on xarelto. Advised pt we would need his SS benefit form and may be able to help with xarelto cost. Pt uses a CPAP at night (not new). [facility] 1/15 @ 3147- video

## 2025-01-16 ENCOUNTER — PATIENT OUTREACH (OUTPATIENT)
Dept: CARE COORDINATION | Age: 70
End: 2025-01-16
Payer: MEDICARE

## 2025-01-16 NOTE — PROGRESS NOTES
1320 - Incoming call from pt. He states that he received a call regarding a script for Xarelto that would cost him $800, which he cannot afford. He wondered if this was related to the PAP.  Secure chat to [employer] PAP alessia was just submitted last evening so no word on that yet; and to disregard the message.  RTC to pt re: same.  He verbalized understanding.

## 2025-01-17 ENCOUNTER — OFFICE VISIT (OUTPATIENT)
Dept: PRIMARY CARE | Facility: CLINIC | Age: 70
End: 2025-01-17
Payer: MEDICARE

## 2025-01-17 VITALS
TEMPERATURE: 97.2 F | HEART RATE: 69 BPM | RESPIRATION RATE: 16 BRPM | SYSTOLIC BLOOD PRESSURE: 116 MMHG | BODY MASS INDEX: 30.1 KG/M2 | WEIGHT: 215 LBS | HEIGHT: 71 IN | OXYGEN SATURATION: 97 % | DIASTOLIC BLOOD PRESSURE: 74 MMHG

## 2025-01-17 DIAGNOSIS — Z86.2 HISTORY OF COAGULOPATHY: ICD-10-CM

## 2025-01-17 DIAGNOSIS — T81.89XD POSTOPERATIVE DEEP VEIN THROMBOSIS (DVT), SUBSEQUENT ENCOUNTER (MULTI): ICD-10-CM

## 2025-01-17 DIAGNOSIS — T81.72XD: ICD-10-CM

## 2025-01-17 DIAGNOSIS — I97.89: ICD-10-CM

## 2025-01-17 DIAGNOSIS — I26.09 ACUTE PULMONARY EMBOLISM WITH ACUTE COR PULMONALE, UNSPECIFIED PULMONARY EMBOLISM TYPE (MULTI): Primary | ICD-10-CM

## 2025-01-17 DIAGNOSIS — I82.409 POSTOPERATIVE DEEP VEIN THROMBOSIS (DVT), SUBSEQUENT ENCOUNTER (MULTI): ICD-10-CM

## 2025-01-17 DIAGNOSIS — I82.409: ICD-10-CM

## 2025-01-17 DIAGNOSIS — E87.6 HYPOKALEMIA: ICD-10-CM

## 2025-01-17 PROCEDURE — 1123F ACP DISCUSS/DSCN MKR DOCD: CPT | Performed by: FAMILY MEDICINE

## 2025-01-17 PROCEDURE — RXMED WILLOW AMBULATORY MEDICATION CHARGE

## 2025-01-17 PROCEDURE — 3008F BODY MASS INDEX DOCD: CPT | Performed by: FAMILY MEDICINE

## 2025-01-17 PROCEDURE — 1036F TOBACCO NON-USER: CPT | Performed by: FAMILY MEDICINE

## 2025-01-17 PROCEDURE — 99496 TRANSJ CARE MGMT HIGH F2F 7D: CPT | Performed by: FAMILY MEDICINE

## 2025-01-17 PROCEDURE — 1160F RVW MEDS BY RX/DR IN RCRD: CPT | Performed by: FAMILY MEDICINE

## 2025-01-17 PROCEDURE — 1159F MED LIST DOCD IN RCRD: CPT | Performed by: FAMILY MEDICINE

## 2025-01-17 NOTE — PROGRESS NOTES
"Subjective   Patient ID: John Garvin is a 70 y.o. male who presents for Hospital Follow-up (Patient was seen at [facility] 01/12/2025 for pulmonary embolism. ).  HPI  Protein c    Review of Systems    Objective   /74 (BP Location: Right arm, Patient Position: Sitting, BP Cuff Size: Large adult)   Pulse 69   Temp 36.2 °C (97.2 °F) (Temporal)   Resp 16   Ht 1.803 m (5' 11\")   Wt 97.5 kg (215 lb)   SpO2 97%   BMI 29.99 kg/m²           Physical Exam        Assessment/Plan   Problem List Items Addressed This Visit    None    " recent constipation and no new GI or  red flags    Review of Systems   Constitutional:  Negative for fatigue, fever and unexpected weight change.   HENT:  Positive for rhinorrhea. Negative for sinus pain and voice change.    Eyes:  Negative for visual disturbance.   Respiratory:  Positive for chest tightness. Negative for cough, shortness of breath and wheezing.    Cardiovascular:  Negative for chest pain, palpitations and leg swelling.   Gastrointestinal:  Negative for abdominal pain, blood in stool and vomiting.   Genitourinary:  Positive for flank pain and frequency. Negative for dysuria and hematuria.   Musculoskeletal:  Positive for arthralgias and back pain (As far as the fusion for lumbar canal stenosis at the end of November doing quite well with good healing of the low back). Negative for myalgias.   Skin:  Negative for rash.   Neurological:  Negative for tremors, weakness, light-headedness and headaches.   Hematological:  Negative for adenopathy.   Psychiatric/Behavioral:  Negative for behavioral problems, confusion, decreased concentration, sleep disturbance and suicidal ideas.      CT angio chest for pulmonary embolism  Status: Edited Result - FINAL     PACS Images     Show images for CT angio chest for pulmonary embolism  Signed by    Signed Time Phone Pager   Herbert Arzate MD 1/12/2025 04:37 [phone]      Exam Information    Status Exam Begun Exam Ended   Final 1/12/2025 04:05 1/12/2025 04:11     Addendum    ADDENDUM: The study's critical findings were discussed by phone with  Dr. Sergei Castanon, who acknowledged the findings, at 04:39.  Signed by Herbert Arzate MD   Addended by Herbert Arzate MD on 1/12/2025  4:42 AM     Study Result    Narrative & Impression   STUDY:  CT Angiogram of the Chest; 1/12/2025 4:12 AM  INDICATION:  Recent spinal surgery.  Sudden onset of right-sided chest pain with  shortness of breath.  Evaluate for pulmonary embolism.  COMPARISON:  CXR 1/12/2025.  CT AP  2/28/2020.  ACCESSION NUMBER(S):  [id number]  ORDERING CLINICIAN:  LALITHA BEATTY  TECHNIQUE:  CTA of the chest was performed with intravenous contrast.   Images are reviewed and processed at a workstation according to the CT  angiogram protocol with 3-D and/or MIP post processing imaging  generated.  Omnipaque 350 75 mL was administered intravenously.  Automated mA/kV exposure control was utilized and patient examination  was performed in strict accordance with principles of ALARA.  FINDINGS:  There are acute pulmonary arterial filling defects in right middle  lobe and right lower lobe segmental branches of the pulmonary  arteries.  There are also acute filling defects in the left lower lobe  segmental pulmonary arteries.  The RV/LV ratio is 1.  The thoracic  aorta is normal in course and caliber without dissection or aneurysm.  The heart is normal in size without pericardial effusion.  Thoracic  lymph nodes are not enlarged.  There is no pleural effusion, pleural thickening, or pneumothorax.   The airways are patent.  Lungs are clear without consolidation, interstitial disease, or  suspicious nodules.  Upper abdomen demonstrates no acute pathology.  There are no acute fractures.  No suspicious bony lesions.  IMPRESSION:  Positive for pulmonary embolism found in pulmonary artery segmental  branches in the right middle and lower lobes, and in the left lower  lobe.  An RV/LV ratio of 1 may predict right ventricular dysfunction.  Signed by Herbert Arzate MD       Result History  B-Type Natriuretic Peptide  Order: [id number]   Status: Final result       Visible to patient: Yes (seen)    0 Result Notes      Component  Ref Range & Units 9 d ago   BNP  0 - 99 pg/mL 15   Resulting Agency [facility]              Narrative  Performed by: [facility]     <100 pg/mL - Heart failure unlikely  100-299 pg/mL - Intermediate probability of acute heart                  failure exacerbation. Correlate with clinical              Magnesium  Order:  [id number]   Status: Final result       Visible to patient: Yes (seen)    0 Result Notes      Component  Ref Range & Units 9 d ago   Magnesium  1.60 - 2.40 mg/dL 1.72   Resulting Agency [facility]             Specimen Colle    Contains abnormal data Comprehensive Metabolic Panel  Order: [id number]   Status: Final result       Visible to patient: Yes (seen)    0 Result Notes            Component  Ref Range & Units 9 d ago  (1/12/25) 5 mo ago  (8/15/24) 1 yr ago  (8/24/23) 1 yr ago  (2/14/23) 2 yr ago  (1/5/23) 2 yr ago  (1/28/22) 4 yr ago  (11/9/20)   Glucose  74 - 99 mg/dL 95 92 82 94 87 98 89   Sodium  136 - 145 mmol/L 141 141 138 140 138 140 141   Potassium  3.5 - 5.3 mmol/L 3.1 Low  4.3 4.1 4.2 3.7 4.4 4.2   Chloride  98 - 107 mmol/L 111 High  107 101 104 104 103 104   Bicarbonate  21 - 32 mmol/L 21 26 29 28 26 31 30   Anion Gap  10 - 20 mmol/L 12 12 12 12 12 10 11   Urea Nitrogen  6 - 23 mg/dL 16 25 High  20 22 16 16 18   Creatinine  0.50 - 1.30 mg/dL 0.68 0.88 0.90 1.03 0.87 0.97 1.02   eGFR  >60 mL/min/1.73m*2 >90 >90 CM        Comment: Calculations of estimated GFR are performed using the 2021 CKD-EPI Study Refit equation without the race variable for the IDMS-Traceable creatinine methods.  https://jasn.asnjournals.org/content/early/2021/09/22/ASN.3886809273   Calcium  8.6 - 10.3 mg/dL 7.5 Low  9.1 10.0 10.2 9.5 9.4 9.8   Albumin  3.4 - 5.0 g/dL 3.7  4.7  4.1 4.3    Alkaline Phosphatase  33 - 136 U/L 80  72  71 73    Total Protein  6.4 - 8.2 g/dL 6.5  7.9  7.9 7.7    AST  9 - 39 U/L 15  20  22 24    Bilirubin, Total  0.0 - 1.2 mg/dL 0.5  1.2  0.9 1.1    ALT  10 - 52 U/L 12  23 CM  26 CM 31 CM    Comment: Patient       Contains abnormal data CBC and Auto Differential  Order: [id number]   Status: Final result       Visible to patient: Yes (seen)    0 Result Notes           Component  Ref Range & Units 9 d ago  (1/12/25) 1 yr ago  (8/24/23) 2 yr ago  (1/5/23) 4 yr ago  (11/21/20) 4 yr ago  (11/9/20) 4 yr ago  (10/20/20)  "  WBC  4.4 - 11.3 x10*3/uL 7.2 9.1 R 6.3 R 10.7 R 6.0 R 4.7 R   nRBC  0.0 - 0.0 /100 WBCs 0.0 0.0 R  0.0 R 0.0 R    RBC  4.50 - 5.90 x10*6/uL 4.25 Low  5.02 R 4.89 R 4.23 Low  R 5.10 R 4.78 R   Hemoglobin  13.5 - 17.5 g/dL 12.6 Low  14.8 14.6 12.5 Low  15.5 14.3   Hematocrit  41.0 - 52.0 % 39.0 Low  45.6 44.1 39.5 Low  47.9 45.3   MCV  80 - 100 fL 92 91 90 93 94 95   MCH  26.0 - 34.0 pg 29.6        MCHC  32.0 - 36.0 g/dL 32.3 32.5 33.1 31.6 Low  32.4 31.6 Low    RDW  11.5 - 14.5 % 12.5 12.5 12.3 12.2 12.3 12.4   Platelets  150 - 450 x10*3/uL 214 224 R 200 R 183 R 205 R 169 R   Neutrophils %  40.0 - 80.0 % 50.7 67.7 59.5   50.4   Immature Granulocytes %, Automated  0.0 - 0.9 % 0.4           Troponin I Series, High Sensitivity (0, 1 HR)  Order: [id number]   Status: Final result       Visible to patient: Yes (seen)    0 Result Notes       Specimen Collected: 01/12/25 03:04 Last Resulted: 01/12/25 05:01       Order Details        View Encounter        Lab and Collection Details        Routing        Result History     View All Conversations on this Encounter     Result Care Coordination      Patient Communication     Add Comments   Seen Back to Top     Troponin, High Sensitivity, 1 Hour  Order: [id number] - Part of Panel Order [id number]   Status: Final result       Visible to patient: Yes (seen)    0 Result Notes       Component  Ref Range & Units 9 d ago  (1/12/25) 9 d ago  (1/12/25)   Troponin I, High Sensitivity  0 - 20 ng/L 4 4   Resulting Agency [facility]              Narrative  Performed by: [facility]  Less than 99th percentile of normal range cutoff-  Female and children under 18 years old <14 ng/L; Male <21 ng       Objective   /74 (BP Location: Right arm, Patient Position: Sitting, BP Cuff Size: Large adult)   Pulse 69   Temp 36.2 °C (97.2 °F) (Temporal)   Resp 16   Ht 1.803 m (5' 11\")   Wt 97.5 kg (215 lb)   SpO2 97%   BMI 29.99 kg/m²           Physical Exam  Vital sign reviewed and normal good pulse ox " good blood pressure  General Spectrila pleasant individual in no acute distress  ENT eyes clear PERRL bilaterally oral exam is benign  Neck neck is all without mass adenopathy bruits rigidity  Chest slightly reduced breath sounds of the right upper lobe compared to the left but otherwise no wheezing no rhonchi no rales no rubs  Cardiovascular RSR without significant murmurs gallop or ectopy  Abdominal no organomegaly mass rebound no distention no ascites no CVA tenderness no suprapubic fullness.  Musculoskeletal low back shows midline incision healing very nicely from L to to L5.  Minimal paravertebral muscle tightness but otherwise no evidence of any incisional paravertebral area with redness.  No CVA tenderness  Neurological no new focal deficit lower extremities moves lower extremities well.  No cranial nerve deficits  Skin nice healing of the 2 incisions low back area otherwise no urticaria petechiae.  Peripheral vascular no JVD peripheral exam shows no edema vascular tone is normal no sensory deficits  Mood mood is stable without anxiety depressive or cognitive issues  Reviewed x-rays lab with patient as well as spouse.      Assessment/Plan   Problem List Items Addressed This Visit    None  Perhaps postoperative 3 postop pulmonary embolus.  He has done very well on Xarelto 30 mg will continue through 21 days after which we will go on 20 mg daily  Even though perhaps triggered PE will check lupus anticoagulant Leyden factor since the other coag screenings were not approved by Medicare  Will recheck potassium since her potassium was replaced in the hospital follow-up in 3 weeks continue moderation and exercise.  Otherwise is done very nicely post discharge and I did review use side effects of his medicine continue potassium hydrochlorothiazide continue increasing liquids.  Aware of ER parameters otherwise follow-up with his orthopedic surgeon reviewed in great detail for his good resolution of pleuritic pain  If  worsening chest pain or shortness of breath despite treatment with weakness, proceed to ER  @discharge  The above diagnosis and treatment plan was discussed with the patient patient will continue appropriate diet and exercise as reviewed  Patient will recheck earlier if any interval problems of significance or clinical worsening of the above problems.  Agrees above surveillance.  All question were addressed regarding above meds

## 2025-01-20 ENCOUNTER — TELEPHONE (OUTPATIENT)
Dept: PRIMARY CARE | Facility: CLINIC | Age: 70
End: 2025-01-20
Payer: MEDICARE

## 2025-01-20 NOTE — TELEPHONE ENCOUNTER
The patient is requesting for his lab orders to be placed into his chart.  He states one of the orders is specifically for his clotting factor/ level to be checked.  He was last seen in office last Friday 1/17/2025.  Please call the patient to inform him once the lab orders have been placed into his chart, therefor he may get them drawn.   Thank you.

## 2025-01-21 PROBLEM — T81.89XA: Status: ACTIVE | Noted: 2025-01-21

## 2025-01-21 PROBLEM — I26.09 ACUTE PULMONARY EMBOLISM WITH ACUTE COR PULMONALE (MULTI): Status: ACTIVE | Noted: 2025-01-21

## 2025-01-21 PROBLEM — I82.409: Status: ACTIVE | Noted: 2025-01-21

## 2025-01-21 PROBLEM — I97.89: Status: ACTIVE | Noted: 2025-01-21

## 2025-01-21 ASSESSMENT — ENCOUNTER SYMPTOMS
CHEST TIGHTNESS: 1
PALPITATIONS: 0
BLOOD IN STOOL: 0
VOICE CHANGE: 0
MYALGIAS: 0
SHORTNESS OF BREATH: 0
DYSURIA: 0
UNEXPECTED WEIGHT CHANGE: 0
VOMITING: 0
SINUS PAIN: 0
DECREASED CONCENTRATION: 0
RHINORRHEA: 1
SLEEP DISTURBANCE: 0
HEADACHES: 0
WEAKNESS: 0
CONFUSION: 0
BACK PAIN: 1
FLANK PAIN: 1
TREMORS: 0
HEMATURIA: 0
FEVER: 0
ADENOPATHY: 0
ARTHRALGIAS: 1
COUGH: 0
FATIGUE: 0
ABDOMINAL PAIN: 0
WHEEZING: 0
LIGHT-HEADEDNESS: 0
FREQUENCY: 1

## 2025-01-22 ENCOUNTER — APPOINTMENT (OUTPATIENT)
Dept: PHARMACY | Facility: HOSPITAL | Age: 70
End: 2025-01-22
Payer: MEDICARE

## 2025-01-22 ENCOUNTER — APPOINTMENT (OUTPATIENT)
Dept: CARE COORDINATION | Age: 70
End: 2025-01-22
Payer: MEDICARE

## 2025-01-22 ENCOUNTER — PATIENT OUTREACH (OUTPATIENT)
Dept: CARE COORDINATION | Age: 70
End: 2025-01-22

## 2025-01-22 DIAGNOSIS — I26.99 PE (PULMONARY THROMBOEMBOLISM) (MULTI): ICD-10-CM

## 2025-01-22 NOTE — PROGRESS NOTES
[facility] at [facility]    John Garivn is a 70 y.o. male was referred to Clinical Pharmacy Team to complete a post-discharge medication optimization and monitoring visit.  The patient was referred for help with cost of medications while in [facility]. Today was our second visit.       Referring Provider: Chip Mojica DO  PCP: Alexander Garza DO - last visit: 1/17, next visit: 2/14      Subjective   Allergies   Allergen Reactions    Adhesive Hives     hives for one week. :    Mold Unknown    Morphine Hives     Very Severe Reaction: Swelling    Yeast Other       [facility] - [place], [place] - [address] [facility]  [address] [facility] [address]  Phone: [phone] Fax: [phone]    [facility]  [address], [address]  [facility] [address]  Phone: [phone] Fax: [phone]      Medication System Management:  Affordability/Accessibility: approved for PAP  Adherence/Organization: no issues       Social History     Social History Narrative    Not on file          HPI      Review of Systems        Objective     There were no vitals taken for this visit.   BP Readings from Last 4 Encounters:   01/17/25 116/74   01/12/25 139/72   09/16/24 126/70   09/12/24 137/80      There were no vitals filed for this visit.     LAB  Lab Results   Component Value Date    BILITOT 0.5 01/12/2025    CALCIUM 7.5 (L) 01/12/2025    CO2 21 01/12/2025     (H) 01/12/2025    CREATININE 0.68 01/12/2025    GLUCOSE 95 01/12/2025    ALKPHOS 80 01/12/2025    K 3.1 (L) 01/12/2025    PROT 6.5 01/12/2025     01/12/2025    AST 15 01/12/2025    ALT 12 01/12/2025    BUN 16 01/12/2025    ANIONGAP 12 01/12/2025    MG 1.72 01/12/2025    ALBUMIN 3.7 01/12/2025    GFRMALE >90 08/24/2023     Lab Results   Component Value Date    TRIG 101 10/20/2020    CHOL 178 10/20/2020    HDL 43.0 10/20/2020     Lab Results   Component Value Date    HGBA1C 6.0 10/20/2020         Current Outpatient Medications    Medication Instructions    acetaminophen (TYLENOL ORAL) 650 mg, 3 times daily    allopurinol (ZYLOPRIM) 150 mg, oral, Daily    cetirizine (ZyrTEC) 10 mg tablet 1 tablet, Daily    cholecalciferol (Vitamin D-3) 50 mcg (2,000 unit) capsule 1 capsule, Daily    fish oil concentrate (Omega-3) 120-180 mg capsule 1 tablet, Daily    fluticasone (Flonase) 50 mcg/actuation nasal spray 1 spray, Daily    hydroCHLOROthiazide (MICROZIDE) 12.5 mg, oral, Daily    melatonin 3 mg tablet 1 tablet, Nightly    multivitamin tablet 1 tablet, Daily    potassium citrate CR (Urocit-K-15) 15 mEq ER tablet 1 tablet, Daily    rivaroxaban (Xarelto) 2.5 mg tablet Take 6 tablets (15 mg) by mouth 2 times daily (morning and late afternoon) for 21 days, THEN 8 tablets (20 mg) once daily with breakfast. Take with food..    rivaroxaban (XARELTO) 20 mg, oral, Daily, Take with food.    tiZANidine (ZANAFLEX) 4 mg, oral, 2 times daily PRN          Assessment/Plan   Problem List Items Addressed This Visit       PE (pulmonary thromboembolism) (Multi)       -no chest pain or SOB any more   -actually starting to feel back to normal   -wears compression stockings daily   -was very happy with how his pcp visit went   -has been monitoring BP's and have been good running around 110-120's/80's      Medications Changes/Concerns:  -will continue Xarelto at 20mg daily for at least 3-6 months   -continue with hydrochlorothiazide 12.5mg daily       Refills Needed:  -none needed today       Plan/To Do:  -continue to log vitals for future visits   -will graduate from [facility] today       [facility] P[facility] Status:  -he has been approved!      Time Spent with Patient and [facility] Team - Dr. Mojica and AMIRA Martin via phone call: 10 minutes      Follow Up: as needed     Continue all meds under the continuation of care with the referring provider and clinical pharmacy team.    Ulysses Bruno, Cesar     Verbal consent to manage patient's drug therapy was obtained from the patient. They were  informed they may decline to participate or withdraw from participation in pharmacy services at any time.

## 2025-01-22 NOTE — PROGRESS NOTES
Daily Call Note: [facility] call with the patient, his wife, Dr. Galina MD and PharmD Ulysses Morales.  The patient stated that he can draw deep breaths without pain or discomfort. He has an appointment with new PCP Dr. Garza  Xarelto being paid for is no longer an issue - the patient stated that it was being shipped to him to arrive on Friday. He advised by the doctor on taking Xarelto correctly. The doctor discussed with the patient readiness for graduation now that he had medical follow care in place and has received the education to assist in management of health care issues - to which the patient and the team agreed. He is aware that he may call [facility] in future should he need guidance.   The patient stated that his doctor is following up on potential genetic factors  Pt demonstrates clear understanding: Yes    Daily Weight:  There were no vitals filed for this visit.   Last 3 Weights:  Wt Readings from Last 7 Encounters:   01/17/25 97.5 kg (215 lb)   01/12/25 98.5 kg (217 lb 2.5 oz)   09/16/24 96.2 kg (212 lb)   09/12/24 98.3 kg (216 lb 11.4 oz)   08/23/24 99.8 kg (220 lb)   06/24/24 100 kg (221 lb)   05/24/24 103 kg (226 lb)       Virtual Visits--Scheduled (Most Recent Date at Top)  Follow up Appointments  Recent Visits  Date Type Provider Dept   01/17/25 Office Visit Alexander Garza, DO [facility]   Showing recent visits within past 30 days and meeting all other requirements  Future Appointments  Date Type Provider Dept   02/14/25 Appointment Alexander Garza, DO [facility]   03/18/25 Appointment Alexander Garza, DO [facility]   Showing future appointments within next 90 days and meeting all other requirements

## 2025-01-23 ENCOUNTER — LAB (OUTPATIENT)
Dept: LAB | Facility: LAB | Age: 70
End: 2025-01-23
Payer: MEDICARE

## 2025-01-23 ENCOUNTER — PHARMACY VISIT (OUTPATIENT)
Dept: PHARMACY | Facility: CLINIC | Age: 70
End: 2025-01-23
Payer: COMMERCIAL

## 2025-01-23 DIAGNOSIS — T81.89XD POSTOPERATIVE DEEP VEIN THROMBOSIS (DVT), SUBSEQUENT ENCOUNTER (MULTI): ICD-10-CM

## 2025-01-23 DIAGNOSIS — E87.6 HYPOKALEMIA: ICD-10-CM

## 2025-01-23 DIAGNOSIS — I26.09 ACUTE PULMONARY EMBOLISM WITH ACUTE COR PULMONALE, UNSPECIFIED PULMONARY EMBOLISM TYPE (MULTI): ICD-10-CM

## 2025-01-23 DIAGNOSIS — I82.409 POSTOPERATIVE DEEP VEIN THROMBOSIS (DVT), SUBSEQUENT ENCOUNTER (MULTI): ICD-10-CM

## 2025-01-23 DIAGNOSIS — Z86.2 HISTORY OF COAGULOPATHY: ICD-10-CM

## 2025-01-23 DIAGNOSIS — I97.89: ICD-10-CM

## 2025-01-23 LAB
ANION GAP SERPL CALC-SCNC: 8 MMOL/L (ref 10–20)
BUN SERPL-MCNC: 17 MG/DL (ref 6–23)
CALCIUM SERPL-MCNC: 9.7 MG/DL (ref 8.6–10.3)
CHLORIDE SERPL-SCNC: 106 MMOL/L (ref 98–107)
CO2 SERPL-SCNC: 30 MMOL/L (ref 21–32)
CREAT SERPL-MCNC: 0.96 MG/DL (ref 0.5–1.3)
EGFRCR SERPLBLD CKD-EPI 2021: 85 ML/MIN/1.73M*2
GLUCOSE SERPL-MCNC: 85 MG/DL (ref 74–99)
POTASSIUM SERPL-SCNC: 4.5 MMOL/L (ref 3.5–5.3)
SODIUM SERPL-SCNC: 139 MMOL/L (ref 136–145)

## 2025-01-23 PROCEDURE — 85730 THROMBOPLASTIN TIME PARTIAL: CPT

## 2025-01-23 PROCEDURE — 85613 RUSSELL VIPER VENOM DILUTED: CPT

## 2025-01-23 PROCEDURE — 80048 BASIC METABOLIC PNL TOTAL CA: CPT

## 2025-01-23 PROCEDURE — 81241 F5 GENE: CPT

## 2025-01-24 ENCOUNTER — PATIENT OUTREACH (OUTPATIENT)
Dept: PRIMARY CARE | Facility: CLINIC | Age: 70
End: 2025-01-24
Payer: MEDICARE

## 2025-01-24 NOTE — PROGRESS NOTES
Call regarding appt. with PCP on 1/17/25  after hospitalization.  At time of outreach call the patient feels as if their condition has improved. He reports he is taking all medications as needed. He has future follow up appts scheduled.  Reviewed the PCP appointment with the pt and addressed any questions or concerns.

## 2025-01-26 LAB
ATRIAL RATE: 83 BPM
ATRIAL RATE: 91 BPM
P AXIS: 31 DEGREES
P AXIS: 36 DEGREES
P OFFSET: 190 MS
P OFFSET: 202 MS
P ONSET: 142 MS
P ONSET: 150 MS
PR INTERVAL: 150 MS
PR INTERVAL: 162 MS
Q ONSET: 223 MS
Q ONSET: 225 MS
QRS COUNT: 13 BEATS
QRS COUNT: 15 BEATS
QRS DURATION: 100 MS
QRS DURATION: 94 MS
QT INTERVAL: 360 MS
QT INTERVAL: 362 MS
QTC CALCULATION(BAZETT): 425 MS
QTC CALCULATION(BAZETT): 442 MS
QTC FREDERICIA: 403 MS
QTC FREDERICIA: 414 MS
R AXIS: -16 DEGREES
R AXIS: 12 DEGREES
T AXIS: 1 DEGREES
T AXIS: 20 DEGREES
T OFFSET: 404 MS
T OFFSET: 405 MS
VENTRICULAR RATE: 83 BPM
VENTRICULAR RATE: 91 BPM

## 2025-01-27 LAB
DRVVT SCREEN TO CONFIRM RATIO: 2.24 RATIO
DRVVT/DRVVT CFM NRMLZD PPP-RTO: 1.66 RATIO
DRVVT/DRVVT CFM P DOAC NEUT NORM PPP-RTO: 1.35 RATIO
ELECTRONICALLY SIGNED BY: NORMAL
FACTOR V LEIDEN INTERPRETATION: NORMAL
FACTOR V LEIDEN RESULT: NORMAL
LA 2 SCREEN W REFLEX-IMP: ABNORMAL
NORMALIZED SCT PPP-RTO: 0.79 RATIO
SILICA CLOTTING TIME CONFIRMATION: 1.5 RATIO
SILICA CLOTTING TIME SCREEN: 1.19 RATIO

## 2025-02-12 ENCOUNTER — TELEPHONE (OUTPATIENT)
Dept: PRIMARY CARE | Facility: CLINIC | Age: 70
End: 2025-02-12
Payer: MEDICARE

## 2025-02-12 NOTE — TELEPHONE ENCOUNTER
Pt c/o aching/burning where his big toe attaches to the foot. There is no heat and it is not red. He takes Tylenol for pain. He is currently taking Xeralto. He had a blood clot in his lung x 4 weeks and wants to know if he should be concerned. Should he have an xr? He is on the schedule on Fri, should he come in sooner?

## 2025-02-14 ENCOUNTER — APPOINTMENT (OUTPATIENT)
Dept: PRIMARY CARE | Facility: CLINIC | Age: 70
End: 2025-02-14
Payer: MEDICARE

## 2025-02-14 DIAGNOSIS — L28.2 PRURITIC RASH: ICD-10-CM

## 2025-02-14 DIAGNOSIS — I10 PRIMARY HYPERTENSION: Primary | ICD-10-CM

## 2025-02-14 DIAGNOSIS — I26.09 ACUTE PULMONARY EMBOLISM WITH ACUTE COR PULMONALE, UNSPECIFIED PULMONARY EMBOLISM TYPE (MULTI): ICD-10-CM

## 2025-02-14 DIAGNOSIS — Z86.2 HISTORY OF COAGULOPATHY: ICD-10-CM

## 2025-02-14 PROCEDURE — 1159F MED LIST DOCD IN RCRD: CPT | Performed by: FAMILY MEDICINE

## 2025-02-14 PROCEDURE — 3078F DIAST BP <80 MM HG: CPT | Performed by: FAMILY MEDICINE

## 2025-02-14 PROCEDURE — 1036F TOBACCO NON-USER: CPT | Performed by: FAMILY MEDICINE

## 2025-02-14 PROCEDURE — 3008F BODY MASS INDEX DOCD: CPT | Performed by: FAMILY MEDICINE

## 2025-02-14 PROCEDURE — 1160F RVW MEDS BY RX/DR IN RCRD: CPT | Performed by: FAMILY MEDICINE

## 2025-02-14 PROCEDURE — 3074F SYST BP LT 130 MM HG: CPT | Performed by: FAMILY MEDICINE

## 2025-02-14 PROCEDURE — 1123F ACP DISCUSS/DSCN MKR DOCD: CPT | Performed by: FAMILY MEDICINE

## 2025-02-14 PROCEDURE — 99213 OFFICE O/P EST LOW 20 MIN: CPT | Performed by: FAMILY MEDICINE

## 2025-02-14 RX ORDER — TRIAMCINOLONE ACETONIDE 1 MG/G
OINTMENT TOPICAL 2 TIMES DAILY PRN
Qty: 15 G | Refills: 1 | Status: SHIPPED | OUTPATIENT
Start: 2025-02-14 | End: 2026-02-14

## 2025-02-14 NOTE — PROGRESS NOTES
Subjective   Patient ID: John Garvin is a 70 y.o. male who presents for Pulmonary Embolism (4 week follow up ).  HPI  Pleasant 70-year-old gentleman is here to recheck his progress in taking his Xarelto 20 mg daily for his PE in January send very well we did do a Leyden factor which was normal but one of his lupus anticoagulant was positive he states he has no edema of the foot and had some mild tenderness around his right great toe and also above it he has no redness or swelling or injury at all to the right foot.  States he might of stubbed his toe but it has no edema of the right lower leg no edema of the ankle no edema of the right foot and no numbness or weakness of the right foot at all.  Very well notably the PE may well have been contributed and triggered by his postop course from his lumbar surgery pains on potassium hydrochlorothiazide for borderline hypertension is done very well otherwise takes melatonin if needed at nighttime allopurinol 100 mg for gout with good control the rash of the lower leg and takes Tylenol if needed because he is off NSAIDs at this time chronic meds reviewed no reported side effect  Review of Systems   Constitutional:  Negative for fatigue, fever and unexpected weight change.   HENT:  Positive for rhinorrhea. Negative for sinus pressure, sinus pain and voice change.    Eyes:  Negative for visual disturbance.   Respiratory:  Negative for cough, chest tightness, shortness of breath and wheezing.    Cardiovascular:  Negative for chest pain, palpitations and leg swelling.   Gastrointestinal:  Negative for abdominal pain, blood in stool and vomiting.   Genitourinary:  Positive for frequency. Negative for dysuria, flank pain and hematuria.   Musculoskeletal:  Negative for arthralgias and myalgias.   Skin:  Positive for rash.   Neurological:  Negative for syncope, weakness, light-headedness and headaches.   Hematological:  Negative for adenopathy.   Psychiatric/Behavioral:  Negative  "for behavioral problems, confusion, sleep disturbance and suicidal ideas.        Objective   /74 (BP Location: Right arm, Patient Position: Sitting, BP Cuff Size: Large adult)   Pulse 84   Temp 36.3 °C (97.3 °F) (Temporal)   Resp 16   Ht 1.803 m (5' 11\")   Wt 99.8 kg (220 lb)   SpO2 98%   BMI 30.68 kg/m²     Lab Results   Component Value Date    GLUCOSE 85 01/23/2025    CALCIUM 9.7 01/23/2025     01/23/2025    K 4.5 01/23/2025    CO2 30 01/23/2025     01/23/2025    BUN 17 01/23/2025    CREATININE 0.96 01/23/2025         Component  Ref Range & Units 3 wk ago   DRVVT Screen  RATIO 2.24   DRVVT Confirmation  RATIO 1.66   DRVVT Test Ratio  <=1.20 RATIO 1.35 High    SCT Screen  RATIO 1.19   SCT Confirmation  RATIO 1.50   SCT Test Ratio  <=1.16 RATIO 0.79   Lupus Anticoagulant Interpretation There is evidence supportive of the presence of a lupus anticoagulant in one or both of these assays. Assay interferences may occur in the presence of factor deficiency/inhibitor and/or anticoagulants. For patients on anti-Vitamin K therapy, repeating DRVVT testing might be indicated when the patient is off anti-vitamin K therapy. The DRVVT assay contains a heparin neutralizer up to 1.0 U/mL. Higher concentrations of heparin may cause interferences. SCT results are not affected by UF heparin up to 0.5 U/mL and LMW Heparin up to 1.0 U/mL. Higher concentrations of heparin may cause interferences. To evaluate the possibility that the current results represent a transient lupus anticoagulant consider repeat testing in greater than 12 weeks if clinically indicated and not previously performed. Correlation with clinical findings and clinical history is necessary to assess significance of results in an individual patient.   Resulting Agency [facility]              Narrative  Performed by: [facility]  The DRVVT ratios may be elevated in patients on anti-vitamin K therapy. Additional confirmatory tests prior to diagnosing or " repeating DRVVT testing might be indicated when the patient is off anti-vitamin K therapy.   Specimen Collected: 01/23/25 10:45 Last Resulted: 01/27/25 10:45         Component  Ref Range & Units    Factor V Leiden Result  Normal Normal   Factor V Leiden Interpretation    INTERPRETATION  NORMAL result indicates there is no detection of Factor V Leiden (F5 R506Q, c.1601G>A). There would not be increased risk for thrombosis that is associated with this mutation.     METHODOLOGY  DNA was extracted from the specimen provided and analyzed using allele-specific TaqMan MGB probes following PCR.      DISCLAIMER  This laboratory developed test was developed and its analytical performance characteristics have been determined by [facility]. This test has not been cleared or approved by the FDA; however, the FDA has determined that such approval is not necessary. The [facility] is CAP accredited and certified under the Clinical Laboratory Improvement Amendments of 1988 (CLIA-88) as qualified to perform high complexity testing.   Electronically signed and reported by Janell Bass MD PhD FAC   Resulting Agency [facility]             Specimen Collected: 01/23/25 10:45 Last Resulted: 01/27/25 14:09     Physical Exam  Vital sign reviewed and normal pulse ox good blood pressure good respirations easy pain General Spectrila pleasant interactive individual no distress  Peripheral vascular examining the right foot shows no sensorimotor vascular deficit there is no edema of the ankle pretibial area of the right foot minimal tenderness of the foot as well as first MTP joint but otherwise no edema discoloration sensory exam is normal arterial pulses are great and no calf edema bilaterally.  Neck neck is supple no mass adenopathy bruits rigidity  Pulmonary chest clear anteriorly and posteriorly  Cardiovascular RSR without murmurs gallop or ectopy mood is stable without anxiety depressive or cognitive issues and I did review normal  lab chemistry and normal Leyden factor but one of the antibodies for coagulants was positive      Assessment/Plan   Problem List Items Addressed This Visit       Acute pulmonary embolism with acute cor pulmonale (Multi)    Primary hypertension - Primary    History of coagulopathy    Pruritic rash    Relevant Medications    triamcinolone (Kenalog) 0.1 % ointment   Overall, he is done very well postoperatively from his L4-L5 cage for his anterolisthesis.  Secondly he remains on his 20 mg of Xarelto without any side effects he has no edema of the lower extremities and may have had a posttraumatic sprain of the right first MTP joint but otherwise no edema of the right lower leg right calf right foot right ankle and normal neurovascular exam of the right foot otherwise  Hydrochlorothiazide low-salt diet rash most likely eczema of the hands with hyper linearity and lower legs will place on ointment at nighttime only for hands plantar aspect of the foot.  He can take it at nighttime only for 2 weeks will recheck him in 6 weeks  Recheck him in 6 weeks at which time we will consult hematology to see if he needs lifelong anticoagulation with lupus anticoagulant which is positive coagulopathy studies at the direction of oncology-hematology.  However, he is doing quite well and no change from a medical management standpoint besides his above Xarelto daily low-fat diet aware of calling parameters cardiopulmonary wise very stable  @discharge  The above diagnosis and treatment plan was discussed with the patient patient will continue appropriate diet and exercise as reviewed  Patient will recheck earlier if any interval problems of significance or clinical worsening of the above problems.  Agrees above surveillance.  All question were addressed regarding above meds

## 2025-02-18 VITALS
HEART RATE: 84 BPM | DIASTOLIC BLOOD PRESSURE: 74 MMHG | HEIGHT: 71 IN | WEIGHT: 220 LBS | TEMPERATURE: 97.3 F | SYSTOLIC BLOOD PRESSURE: 116 MMHG | RESPIRATION RATE: 16 BRPM | BODY MASS INDEX: 30.8 KG/M2 | OXYGEN SATURATION: 98 %

## 2025-02-18 PROBLEM — I10 PRIMARY HYPERTENSION: Status: ACTIVE | Noted: 2025-02-18

## 2025-02-18 PROBLEM — Z86.2 HISTORY OF COAGULOPATHY: Status: ACTIVE | Noted: 2025-02-18

## 2025-02-18 PROBLEM — L28.2 PRURITIC RASH: Status: ACTIVE | Noted: 2025-02-18

## 2025-02-18 ASSESSMENT — ENCOUNTER SYMPTOMS
COUGH: 0
FATIGUE: 0
CHEST TIGHTNESS: 0
VOICE CHANGE: 0
MYALGIAS: 0
ARTHRALGIAS: 0
UNEXPECTED WEIGHT CHANGE: 0
LIGHT-HEADEDNESS: 0
VOMITING: 0
HEMATURIA: 0
SLEEP DISTURBANCE: 0
PALPITATIONS: 0
WHEEZING: 0
RHINORRHEA: 1
DYSURIA: 0
SHORTNESS OF BREATH: 0
SINUS PRESSURE: 0
CONFUSION: 0
HEADACHES: 0
SINUS PAIN: 0
BLOOD IN STOOL: 0
ABDOMINAL PAIN: 0
FLANK PAIN: 0
ADENOPATHY: 0
WEAKNESS: 0
FEVER: 0
FREQUENCY: 1

## 2025-02-27 ENCOUNTER — OFFICE VISIT (OUTPATIENT)
Dept: ORTHOPEDIC SURGERY | Facility: CLINIC | Age: 70
End: 2025-02-27
Payer: MEDICARE

## 2025-02-27 ENCOUNTER — HOSPITAL ENCOUNTER (OUTPATIENT)
Dept: RADIOLOGY | Facility: HOSPITAL | Age: 70
Discharge: HOME | End: 2025-02-27
Payer: MEDICARE

## 2025-02-27 DIAGNOSIS — M79.672 LEFT FOOT PAIN: ICD-10-CM

## 2025-02-27 DIAGNOSIS — M17.11 PRIMARY OSTEOARTHRITIS OF RIGHT KNEE: Primary | ICD-10-CM

## 2025-02-27 PROCEDURE — 99214 OFFICE O/P EST MOD 30 MIN: CPT | Performed by: ORTHOPAEDIC SURGERY

## 2025-02-27 PROCEDURE — 1123F ACP DISCUSS/DSCN MKR DOCD: CPT | Performed by: ORTHOPAEDIC SURGERY

## 2025-02-27 PROCEDURE — 73630 X-RAY EXAM OF FOOT: CPT | Mod: LT

## 2025-02-27 PROCEDURE — 20610 DRAIN/INJ JOINT/BURSA W/O US: CPT | Performed by: ORTHOPAEDIC SURGERY

## 2025-02-27 RX ORDER — METHYLPREDNISOLONE 4 MG/1
TABLET ORAL
Qty: 21 TABLET | Refills: 0 | Status: SHIPPED | OUTPATIENT
Start: 2025-02-27

## 2025-02-27 RX ORDER — LIDOCAINE HYDROCHLORIDE 10 MG/ML
8 INJECTION, SOLUTION INFILTRATION; PERINEURAL
Status: COMPLETED | OUTPATIENT
Start: 2025-02-27 | End: 2025-02-27

## 2025-02-27 RX ORDER — TRIAMCINOLONE ACETONIDE 40 MG/ML
40 INJECTION, SUSPENSION INTRA-ARTICULAR; INTRAMUSCULAR
Status: COMPLETED | OUTPATIENT
Start: 2025-02-27 | End: 2025-02-27

## 2025-02-27 RX ORDER — CELECOXIB 200 MG/1
200 CAPSULE ORAL DAILY
Qty: 30 CAPSULE | Refills: 0 | Status: SHIPPED | OUTPATIENT
Start: 2025-02-27 | End: 2025-03-29

## 2025-02-27 RX ADMIN — LIDOCAINE HYDROCHLORIDE 8 ML: 10 INJECTION, SOLUTION INFILTRATION; PERINEURAL at 11:24

## 2025-02-27 RX ADMIN — TRIAMCINOLONE ACETONIDE 40 MG: 40 INJECTION, SUSPENSION INTRA-ARTICULAR; INTRAMUSCULAR at 11:24

## 2025-02-27 NOTE — PROGRESS NOTES
Chief Complaint   Patient presents with    Left Foot - Pain     New problem, xrays today     History of Present Illness:   2/27/25: We gave him a cortisone injection into the right knee on 9/26/24. He did get good relief with the injection but it is starting to bother him again. He would like to repeat the cortisone injection today. He has aching pain and soreness in his left mid-foot today. He is now on Xarelto.     9/26/24: Right knee cortisone injection on 05/02/24, worked well. He would like a repeat injection today. He is getting a lumbar fusion by Dr. Jacobsen at the [facility] in November.     5/2/24: He had a gel injection with gave some relief. Would like a cortisone injection today. He notes that he is getting a back ablation soon. He has been doing his exercises but states that he has been working too much.     10/23/23: Patient presents for follow-up from his right knee scope with 20% medial meniscectomy. States that he is doing well has a little bit of stiffness in the knee though although he has low back issues and is stiff in general on the right side. Denies any instability in the knee.     Imaging:  X-rays right knee: No acute fractures or dislocations.  Mild to moderate medial compartment arthritis  X-rays left foot: Shows no acute fractures or dislocations. No arthritic change.       Assessment:   Right knee arthritis flare  Left midfoot sprain     Plan:  We reviewed the role of imaging, physical therapy, injections and the time frame to healing and correlation with outcome.  Medrol Dosepak  Celebrex with caution given his current use of Xarelto. He can further discuss this with his other physicians.   Ice: 60 minutes on and off  Exercise home program: Medically directed foot and knee therapy / Handout given.  Right knee cortisone injection today.  Follow-up as needed    L Inj/Asp: R knee on 2/27/2025 11:24 AM  Indications: pain  Details: 22 G needle, anterolateral approach  Medications: 8 mL  lidocaine 10 mg/mL (1 %); 40 mg triamcinolone acetonide 40 mg/mL  Outcome: tolerated well, no immediate complications  Procedure, treatment alternatives, risks and benefits explained, specific risks discussed. Consent was given by the patient. Immediately prior to procedure a time out was called to verify the correct patient, procedure, equipment, support staff and site/side marked as required. Patient was prepped and draped in the usual sterile fashion.          Physical Exam:  Well-nourished, well-developed. No acute distress. Alert and oriented x3. Responds appropriately to questioning. Good mood. Normal affect.  Physical Exam  Right Knee:  ROM: Full motion  Positive Jcarlos´s test/PositiveApley Grind  Neurovascular exam normal distally  Palpable pedal pulse and good cap refill  Tenderness along medial joint line    Left foot: Moderate pain over the first MTT joint. Great toe intact extensor and flexor.    Review of Systems:  GENERAL: Negative for malaise, significant weight loss, fever  MUSCULOSKELETAL: See HPI  NEURO:  Negative     Past Medical History:   Diagnosis Date    Acute maxillary sinusitis, unspecified 11/27/2019    Acute non-recurrent maxillary sinusitis    Calculus of kidney 03/14/2019    Recurrent kidney stones    Calculus of kidney 03/10/2020    Uric acid kidney stone    Cervicogenic headache 12/22/2020    Headache, cervicogenic    Dorsalgia, unspecified     Upper back pain    Epigastric pain 05/29/2020    Abdominal wall pain in epigastric region    Erectile dysfunction     Ganglion, right hand 01/04/2023    Ganglion, finger of right hand    Insect bite (nonvenomous) of other part of head, initial encounter 09/08/2021    Insect bite of cheek, initial encounter    Pain due to genitourinary prosthetic devices, implants and grafts, initial encounter (CMS-HCC)     Pain due to ureteral stent    Personal history of other diseases of the musculoskeletal system and connective tissue     History of back  pain    Personal history of other diseases of urinary system 03/10/2020    History of hematuria    Personal history of other specified conditions 08/20/2021    History of chronic fatigue    Personal history of other specified conditions 02/19/2020    History of left flank pain    Personal history of urinary calculi 06/25/2020    History of renal calculi    Personal history of urinary calculi     History of kidney stones    Sleep apnea        Medication Documentation Review Audit       Reviewed by Alexander Garza DO (Physician) on 02/18/25 at 2129      Medication Order Taking? Sig Documenting Provider Last Dose Status   acetaminophen (TYLENOL ORAL) [id number] Yes Take 650 mg by mouth 3 times a day. CAPS Historical Provider, MD  Active   allopurinol (Zyloprim) 300 mg tablet [id number] Yes Take 0.5 tablets (150 mg) by mouth once daily. Alexander Garza DO  Active   cetirizine (ZyrTEC) 10 mg tablet [id number] Yes Take 1 tablet (10 mg) by mouth once daily. Historical Provider, MD  Active   cholecalciferol (Vitamin D-3) 50 mcg (2,000 unit) capsule [id number] Yes Take 1 capsule (50 mcg) by mouth once daily. Historical Provider, MD  Active   fish oil concentrate (Omega-3) 120-180 mg capsule [id number] Yes Take 1 tablet by mouth 1 (one) time each day. Historical Provider, MD  Active   fluticasone (Flonase) 50 mcg/actuation nasal spray [id number] Yes Administer 1 spray into each nostril once daily. Historical Provider, MD  Active   hydroCHLOROthiazide (Microzide) 12.5 mg tablet [id number] Yes Take 1 tablet (12.5 mg) by mouth once daily. Alexander Garza DO  Active   magnesium glycinate 100 mg tablet [id number] Yes Take 500 mg by mouth once daily at bedtime. Historical Provider, MD  Active   melatonin 3 mg tablet [id number] Yes Take 1 tablet (3 mg) by mouth once daily at bedtime. Historical Provider, MD  Active   multivitamin tablet [id number] Yes Take 1 tablet by mouth once daily. Historical Provider, MD  Active   potassium citrate CR  (Urocit-K-15) 15 mEq ER tablet [id number] Yes Take 1 tablet (15 mEq) by mouth once daily. Historical Provider, MD  Active     Discontinued 02/18/25 2129   rivaroxaban (Xarelto) 20 mg tablet [id number] Yes Take 1 tablet (20 mg) by mouth once daily. Take with food. Chanelle Valdes MD  Active   tiZANidine (Zanaflex) 4 mg tablet [id number] Yes Take 1 tablet (4 mg) by mouth 2 times a day as needed for muscle spasms. Alexander Garza, DO  Active   triamcinolone (Kenalog) 0.1 % ointment [id number]  Apply topically 2 times a day as needed for irritation or rash. Alexander Garza, DO  Active                    Allergies   Allergen Reactions    Adhesive Hives     hives for one week. :    Mold Unknown    Morphine Hives     Very Severe Reaction: Swelling    Yeast Other       Social History     Socioeconomic History    Marital status:      Spouse name: Not on file    Number of children: Not on file    Years of education: Not on file    Highest education level: Not on file   Occupational History    Not on file   Tobacco Use    Smoking status: Never     Passive exposure: Past    Smokeless tobacco: Never   Substance and Sexual Activity    Alcohol use: Never    Drug use: Never    Sexual activity: Yes     Partners: Female     Birth control/protection: Post-menopausal, Female Sterilization   Other Topics Concern    Not on file   Social History Narrative    Not on file     Social Drivers of Health     Financial Resource Strain: Low Risk  (1/15/2025)    Overall Financial Resource Strain (CARDIA)     Difficulty of Paying Living Expenses: Not hard at all   Food Insecurity: No Food Insecurity (1/15/2025)    Hunger Vital Sign     Worried About Running Out of Food in the Last Year: Never true     Ran Out of Food in the Last Year: Never true   Transportation Needs: No Transportation Needs (1/15/2025)    PRAPARE - Transportation     Lack of Transportation (Medical): No     Lack of Transportation (Non-Medical): No   Physical Activity: Inactive  (1/15/2025)    Exercise Vital Sign     Days of Exercise per Week: 0 days     Minutes of Exercise per Session: 0 min   Stress: No Stress Concern Present (1/15/2025)    [employer] [place] [employer] - Occupational Stress Questionnaire     Feeling of Stress : Not at all   Social Connections: Socially Integrated (1/15/2025)    Social Connection and Isolation Panel [NHANES]     Frequency of Communication with Friends and Family: More than three times a week     Frequency of Social Gatherings with Friends and Family: More than three times a week     Attends Faith Services: More than 4 times per year     Active Member of Clubs or Organizations: Yes     Attends Club or Organization Meetings: More than 4 times per year     Marital Status:    Intimate Partner Violence: Not At Risk (1/15/2025)    Humiliation, Afraid, Rape, and Kick questionnaire     Fear of Current or Ex-Partner: No     Emotionally Abused: No     Physically Abused: No     Sexually Abused: No   Housing Stability: Low Risk  (1/15/2025)    Housing Stability Vital Sign     Unable to Pay for Housing in the Last Year: No     Number of Times Moved in the Last Year: 0     Homeless in the Last Year: No       Past Surgical History:   Procedure Laterality Date    KIDNEY STONE SURGERY  1985 / 2021    OTHER SURGICAL HISTORY  03/14/2019    Lithotripsy    OTHER SURGICAL HISTORY  03/14/2019    Nasal septoplasty    OTHER SURGICAL HISTORY  12/14/2022    Ganglion cyst excision    OTHER SURGICAL HISTORY  06/30/2022    Percutaneous nephrolithotomy    OTHER SURGICAL HISTORY  07/16/2021    Kidney surgery    OTHER SURGICAL HISTORY  05/20/2019    Partial atrial septal defect repair    OTHER SURGICAL HISTORY  05/20/2019    Lithotripsy       No results found.

## 2025-03-07 ENCOUNTER — PATIENT OUTREACH (OUTPATIENT)
Dept: PRIMARY CARE | Facility: CLINIC | Age: 70
End: 2025-03-07
Payer: MEDICARE

## 2025-03-07 NOTE — PROGRESS NOTES
Call placed regarding one month post discharge follow up call. At time of outreach call the patient feels as if their condition has improved.  He is taking all medications as ordered. Reports he is still recovering from his back surgery but feels he is doing well. He has another follow up with PCP on 3-18-25. No concerns at this time

## 2025-03-10 ENCOUNTER — HOSPITAL ENCOUNTER (EMERGENCY)
Facility: HOSPITAL | Age: 70
Discharge: HOME | End: 2025-03-10
Attending: EMERGENCY MEDICINE
Payer: MEDICARE

## 2025-03-10 ENCOUNTER — APPOINTMENT (OUTPATIENT)
Dept: RADIOLOGY | Facility: HOSPITAL | Age: 70
End: 2025-03-10
Payer: MEDICARE

## 2025-03-10 VITALS
BODY MASS INDEX: 30.8 KG/M2 | DIASTOLIC BLOOD PRESSURE: 80 MMHG | HEIGHT: 71 IN | HEART RATE: 71 BPM | RESPIRATION RATE: 18 BRPM | TEMPERATURE: 97.5 F | WEIGHT: 220 LBS | SYSTOLIC BLOOD PRESSURE: 140 MMHG | OXYGEN SATURATION: 97 %

## 2025-03-10 DIAGNOSIS — S09.90XA CLOSED HEAD INJURY, INITIAL ENCOUNTER: Primary | ICD-10-CM

## 2025-03-10 DIAGNOSIS — S00.01XA ABRASION OF SCALP, INITIAL ENCOUNTER: ICD-10-CM

## 2025-03-10 DIAGNOSIS — T50.905A MEDICATION INDUCED COAGULOPATHY (MULTI): ICD-10-CM

## 2025-03-10 DIAGNOSIS — D68.9 MEDICATION INDUCED COAGULOPATHY (MULTI): ICD-10-CM

## 2025-03-10 PROCEDURE — 70450 CT HEAD/BRAIN W/O DYE: CPT | Performed by: RADIOLOGY

## 2025-03-10 PROCEDURE — 72125 CT NECK SPINE W/O DYE: CPT

## 2025-03-10 PROCEDURE — 70450 CT HEAD/BRAIN W/O DYE: CPT

## 2025-03-10 PROCEDURE — 99285 EMERGENCY DEPT VISIT HI MDM: CPT | Mod: 25

## 2025-03-10 PROCEDURE — 72125 CT NECK SPINE W/O DYE: CPT | Performed by: RADIOLOGY

## 2025-03-10 PROCEDURE — 99284 EMERGENCY DEPT VISIT MOD MDM: CPT | Mod: 25 | Performed by: EMERGENCY MEDICINE

## 2025-03-10 ASSESSMENT — LIFESTYLE VARIABLES
EVER FELT BAD OR GUILTY ABOUT YOUR DRINKING: NO
HAVE YOU EVER FELT YOU SHOULD CUT DOWN ON YOUR DRINKING: NO
HAVE PEOPLE ANNOYED YOU BY CRITICIZING YOUR DRINKING: NO
EVER HAD A DRINK FIRST THING IN THE MORNING TO STEADY YOUR NERVES TO GET RID OF A HANGOVER: NO
TOTAL SCORE: 0

## 2025-03-10 ASSESSMENT — PAIN SCALES - GENERAL
PAINLEVEL_OUTOF10: 0 - NO PAIN
PAINLEVEL_OUTOF10: 0 - NO PAIN

## 2025-03-10 ASSESSMENT — PAIN - FUNCTIONAL ASSESSMENT
PAIN_FUNCTIONAL_ASSESSMENT: 0-10
PAIN_FUNCTIONAL_ASSESSMENT: 0-10

## 2025-03-10 NOTE — ED PROVIDER NOTES
HPI   Chief Complaint   Patient presents with    Trauma     HIS, tree branch hit head, no LOC, on xarelto         History provided by:  Patient    Chief Complaint   Patient presents with    Trauma     HIS, tree branch hit head, no LOC, on xarelto       History of Present Illness:  John Garvin is a 70 y.o. male presents with head injury that occurred just prior to arrival.  The patient was trimming tree branches when a branch came down and struck him on the top of the head.  He does have an abrasion to his scalp.  No loss of consciousness.  He is on Xarelto.  No loss of motor or sensory function.  No loss of bladder or bowel function.  No nausea or vomiting.  No lightheadedness or dizziness.  Prior to arrival, his wife cleaned out the abrasion on his scalp with hydrogen peroxide and applied a Band-Aid.      PMFSH:   As per HPI, otherwise nurses notes reviewed in EMR.    Past Medical History:   Past Medical History:   Diagnosis Date    Acute maxillary sinusitis, unspecified 11/27/2019    Acute non-recurrent maxillary sinusitis    Calculus of kidney 03/14/2019    Recurrent kidney stones    Calculus of kidney 03/10/2020    Uric acid kidney stone    Cervicogenic headache 12/22/2020    Headache, cervicogenic    Dorsalgia, unspecified     Upper back pain    Epigastric pain 05/29/2020    Abdominal wall pain in epigastric region    Erectile dysfunction     Ganglion, right hand 01/04/2023    Ganglion, finger of right hand    Insect bite (nonvenomous) of other part of head, initial encounter 09/08/2021    Insect bite of cheek, initial encounter    Pain due to genitourinary prosthetic devices, implants and grafts, initial encounter (CMS-HCC)     Pain due to ureteral stent    Personal history of other diseases of the musculoskeletal system and connective tissue     History of back pain    Personal history of other diseases of urinary system 03/10/2020    History of hematuria    Personal history of other specified conditions  08/20/2021    History of chronic fatigue    Personal history of other specified conditions 02/19/2020    History of left flank pain    Personal history of urinary calculi 06/25/2020    History of renal calculi    Personal history of urinary calculi     History of kidney stones    Sleep apnea       Past Surgical History:   Past Surgical History:   Procedure Laterality Date    KIDNEY STONE SURGERY  1985 / 2021    OTHER SURGICAL HISTORY  03/14/2019    Lithotripsy    OTHER SURGICAL HISTORY  03/14/2019    Nasal septoplasty    OTHER SURGICAL HISTORY  12/14/2022    Ganglion cyst excision    OTHER SURGICAL HISTORY  06/30/2022    Percutaneous nephrolithotomy    OTHER SURGICAL HISTORY  07/16/2021    Kidney surgery    OTHER SURGICAL HISTORY  05/20/2019    Partial atrial septal defect repair    OTHER SURGICAL HISTORY  05/20/2019    Lithotripsy      Family History:   Family History   Problem Relation Name Age of Onset    Lung cancer Father Jovany Garvin     Breast cancer Father Jovany Garvin     Other (cardiac disorder) Mother Fabiola Garvin     Heart disease Mother Fabiola Garvin     Breast cancer Sister      Other (cardiac disorder) Brother      Other (bladder cancer) Brother        Social History:    Social History     Tobacco Use    Smoking status: Never     Passive exposure: Past    Smokeless tobacco: Never   Substance Use Topics    Alcohol use: Never    Drug use: Never     Allergies:   Allergies   Allergen Reactions    Adhesive Hives     hives for one week. :    Mold Unknown    Morphine Hives     Very Severe Reaction: Swelling    Yeast Other     Current Outpatient Medications   Medication Instructions    acetaminophen (TYLENOL ORAL) 650 mg, 3 times daily    allopurinol (ZYLOPRIM) 150 mg, oral, Daily    celecoxib (CELEBREX) 200 mg, oral, Daily    cetirizine (ZyrTEC) 10 mg tablet 1 tablet, Daily    cholecalciferol (Vitamin D-3) 50 mcg (2,000 unit) capsule 1 capsule, Daily    fish oil concentrate (Omega-3) 120-180 mg capsule 1  tablet, Daily    fluticasone (Flonase) 50 mcg/actuation nasal spray 1 spray, Daily    hydroCHLOROthiazide (MICROZIDE) 12.5 mg, oral, Daily    magnesium glycinate 500 mg, Nightly    melatonin 3 mg tablet 1 tablet, Nightly    methylPREDNISolone (Medrol Dospak) 4 mg tablets Use as directed by package instructions    multivitamin tablet 1 tablet, Daily    potassium citrate CR (Urocit-K-15) 15 mEq ER tablet 1 tablet, Daily    tiZANidine (ZANAFLEX) 4 mg, oral, 2 times daily PRN    triamcinolone (Kenalog) 0.1 % ointment Topical, 2 times daily PRN    Xarelto 20 mg, oral, Daily, Take with food.              Patient History   Past Medical History:   Diagnosis Date    Acute maxillary sinusitis, unspecified 11/27/2019    Acute non-recurrent maxillary sinusitis    Calculus of kidney 03/14/2019    Recurrent kidney stones    Calculus of kidney 03/10/2020    Uric acid kidney stone    Cervicogenic headache 12/22/2020    Headache, cervicogenic    Dorsalgia, unspecified     Upper back pain    Epigastric pain 05/29/2020    Abdominal wall pain in epigastric region    Erectile dysfunction     Ganglion, right hand 01/04/2023    Ganglion, finger of right hand    Insect bite (nonvenomous) of other part of head, initial encounter 09/08/2021    Insect bite of cheek, initial encounter    Pain due to genitourinary prosthetic devices, implants and grafts, initial encounter (CMS-HCC)     Pain due to ureteral stent    Personal history of other diseases of the musculoskeletal system and connective tissue     History of back pain    Personal history of other diseases of urinary system 03/10/2020    History of hematuria    Personal history of other specified conditions 08/20/2021    History of chronic fatigue    Personal history of other specified conditions 02/19/2020    History of left flank pain    Personal history of urinary calculi 06/25/2020    History of renal calculi    Personal history of urinary calculi     History of kidney stones    Sleep  "apnea      Past Surgical History:   Procedure Laterality Date    KIDNEY STONE SURGERY  1985 / 2021    OTHER SURGICAL HISTORY  03/14/2019    Lithotripsy    OTHER SURGICAL HISTORY  03/14/2019    Nasal septoplasty    OTHER SURGICAL HISTORY  12/14/2022    Ganglion cyst excision    OTHER SURGICAL HISTORY  06/30/2022    Percutaneous nephrolithotomy    OTHER SURGICAL HISTORY  07/16/2021    Kidney surgery    OTHER SURGICAL HISTORY  05/20/2019    Partial atrial septal defect repair    OTHER SURGICAL HISTORY  05/20/2019    Lithotripsy     Family History   Problem Relation Name Age of Onset    Lung cancer Father Jovany Garvin     Breast cancer Father Jovany Garvin     Other (cardiac disorder) Mother Fabiola Garvin     Heart disease Mother Fabiola Garvin     Breast cancer Sister      Other (cardiac disorder) Brother      Other (bladder cancer) Brother       Social History     Tobacco Use    Smoking status: Never     Passive exposure: Past    Smokeless tobacco: Never   Substance Use Topics    Alcohol use: Never    Drug use: Never       Physical Exam   ED Triage Vitals [03/10/25 1355]   Temperature Heart Rate Respirations BP   36.4 °C (97.5 °F) 83 17 (!) 157/93      Pulse Ox Temp Source Heart Rate Source Patient Position   97 % Temporal Monitor Sitting      BP Location FiO2 (%)     Right arm --       Physical Exam  Physical Exam:    ED Triage Vitals [03/10/25 1355]   Temperature Heart Rate Respirations BP   36.4 °C (97.5 °F) 83 17 (!) 157/93      Pulse Ox Temp Source Heart Rate Source Patient Position   97 % Temporal Monitor Sitting      BP Location FiO2 (%)     Right arm --         Patient Vitals for the past 24 hrs:   BP Temp Temp src Pulse Resp SpO2 Height Weight   03/10/25 1355 (!) 157/93 36.4 °C (97.5 °F) Temporal 83 17 97 % 1.803 m (5' 11\") 99.8 kg (220 lb)       Constitutional: Vital signs per nursing notes.  Well developed, well nourished.  Mild acute distress.    Psychiatric: alert and oriented to person, place, and time; " no abnormalities of mood or affect; memory intact  Eyes: PERRL; conjunctivae and lids normal; EOMI  ENT: otoscopic exam of external canal and TM´s normal; nasal mucosa, turbinates, and septum normal; mouth, tongue, and pharynx normal; pharynx without edema, exudate, or injection  Respiratory: normal respiratory effort and excursion; no rales, rhonchi, or wheezes; equal air entry  Cardiovascular: regular rate and rhythm; no murmurs, rubs or gallops; symmetric pulses; no edema; normal capillary refill; distal pulses present  Neurological: normal speech; CN II-XII grossly intact; DTRs normal, no pathologic reflexes; normal motor and sensory function; no nystagmus; no pronator drift; normal finger to nose and heel to shin tests  GI: no masses, tenderness, rebound or guarding; no palpable, pulsatile mass; no organomegaly; no hernia; normal bowel sounds; (-) Romeo´s sign; (-) McBurney´s sign; (-) CVA tenderness  Lymphatic: no adenopathy of neck  Musculoskeletal: normal gait and station; normal digits and nails; no gross tendon or ligament injury; normal to palpation; normal strength/tone; neurovascular status intact; (-) Jorden´s sign; (-) straight leg raise  Skin: normal to inspection; normal to palpation; no rash; except abrasion to the left frontal scalp  GCS: 15      ED Course & MDM   Diagnoses as of 03/10/25 1427   Closed head injury, initial encounter   Abrasion of scalp, initial encounter   Medication induced coagulopathy (Multi)                 No data recorded                                 Medical Decision Making  Medical Decision Making:    EKG:    Labs: Labs Reviewed - No data to display    Diagnostic Imaging:   CT head wo IV contrast   Final Result   No acute intracranial abnormality. Consider follow-up with MRI as   warranted.             Signed by: Asmita Gerardo 3/10/2025 2:12 PM   Dictation workstation:   [id number]      CT cervical spine wo IV contrast   Final Result   No evidence for an acute  fracture or subluxation of the cervical   spine.        Advanced degenerative changes.        Signed by: Asmita Gerardo 3/10/2025 2:17 PM   Dictation workstation:   [id number]          ED Medication Administration:   Medications   diphth,pertus(acell),tetanus (BoostRIX) 2.5-8-5 Lf-mcg-Lf/0.5mL vaccine 0.5 mL (has no administration in time range)       ED Course:     John Garvin is a 70 y.o. male presents with head injury.    Differential Diagnoses Considered:  Intracranial bleed, skull fracture      Diagnoses as of 03/10/25 1427   Closed head injury, initial encounter   Abrasion of scalp, initial encounter   Medication induced coagulopathy (Multi)       Abnormal Labs Reviewed - No data to display    BP (!) 157/93 (03/10/25 1355)    Temp 36.4 °C (97.5 °F) (03/10/25 1355)    Pulse 83 (03/10/25 1355)   Resp 17 (03/10/25 1355)    SpO2 97 % (03/10/25 1355)      Patient presents with head trauma.    Considered bloodwork (including CBC, CMP), but no bloodwork indicated as I considered but do not suspect severe anemia, electrolyte abnormality (including hypokalemia, hyperkalemia, hyponatremia, hypernatremia, hypoglycemia, hyperglycemia, hypomagnesemia).    Considered EKG, but not indicated as I considered but do not suspect arrhythmia/ACS.    CT brain to evaluate for evidence of traumatic intracranial hemorrhage/injury/skull fracture.      CT C-spine to evaluate for possible C-spine fracture/dislocation/injury.         Diagnostic evaluation was completed.  CT of the head shows no acute intracranial abnormality.  CT of the cervical spine shows no evidence for an acute fracture or subluxation of the cervical spine.      The patient was given a tetanus booster.    Medications administered improved the patient's condition.    Discussed head injury instructions with the patient and/or family/friend present.  Recommended a trusted person check on the patient several times over the next 24 hours.  Instructed patient and  family/friend to return to hospital with increasing headache, repeated vomiting, weakness, clumsiness, drowsiness, or fluid from the nose or ear that might represent a leak of cerebrospinal fluid.  Headache and irritability are common for a day or more after concussion, particularly in children.  Recommended that they not resume normal activity until they are free of headache and dizziness.  Post-concussive syndrome consists of a constellation of symptoms, mainly headache, dizziness, and trouble concentrating, in the days and weeks following concussion.     I discussed with the patient the importance of follow up for his/her wound.  I instructed the patient to keep the wound clean and dry, not to get it wet for 24 hours, and not to soak it under water until sutures removed.  I also shared tips to reduce scarring, including applying antibiotic ointment multiple times per day, avoiding direct sun exposure to healing wound, and applying Mederma after sutures are removed.  Signs of infection were given, as well as reasons to return to the ED.      I discussed the results and discharge plan with the patient and/or family/friend if present.  I emphasized the importance of follow up with the physician I referred them to in the timeframe recommended.  I explained reasons for the patient to return to the Emergency Department.  Questions were addressed.  The patient and/or family/friend expressed understanding.      Patient was instructed to have follow up with primary doctor or specialist within 1-3 days.      Shared decision making made with patient, and/or family, who agrees with plan.      Diagnosis:   1. Closed head injury, initial encounter    2. Abrasion of scalp, initial encounter    3. Medication induced coagulopathy (Multi)                    Procedure  Procedures     Patrick ASHRAF MD  03/10/25 [id number]

## 2025-03-18 ENCOUNTER — APPOINTMENT (OUTPATIENT)
Dept: PRIMARY CARE | Facility: CLINIC | Age: 70
End: 2025-03-18
Payer: MEDICARE

## 2025-03-18 VITALS
SYSTOLIC BLOOD PRESSURE: 114 MMHG | DIASTOLIC BLOOD PRESSURE: 82 MMHG | BODY MASS INDEX: 31.08 KG/M2 | RESPIRATION RATE: 16 BRPM | HEIGHT: 71 IN | WEIGHT: 222 LBS | TEMPERATURE: 97 F | HEART RATE: 80 BPM | OXYGEN SATURATION: 97 %

## 2025-03-18 DIAGNOSIS — G89.29 CHRONIC MIDLINE BACK PAIN, UNSPECIFIED BACK LOCATION: ICD-10-CM

## 2025-03-18 DIAGNOSIS — M54.9 CHRONIC MIDLINE BACK PAIN, UNSPECIFIED BACK LOCATION: ICD-10-CM

## 2025-03-18 DIAGNOSIS — I26.09 OTHER PULMONARY EMBOLISM WITH ACUTE COR PULMONALE, UNSPECIFIED CHRONICITY (MULTI): Primary | ICD-10-CM

## 2025-03-18 DIAGNOSIS — N20.0 LEFT RENAL STONE: ICD-10-CM

## 2025-03-18 DIAGNOSIS — E79.0 HYPERURICEMIA: ICD-10-CM

## 2025-03-18 PROCEDURE — 1123F ACP DISCUSS/DSCN MKR DOCD: CPT | Performed by: FAMILY MEDICINE

## 2025-03-18 PROCEDURE — 1159F MED LIST DOCD IN RCRD: CPT | Performed by: FAMILY MEDICINE

## 2025-03-18 PROCEDURE — 3008F BODY MASS INDEX DOCD: CPT | Performed by: FAMILY MEDICINE

## 2025-03-18 PROCEDURE — 1160F RVW MEDS BY RX/DR IN RCRD: CPT | Performed by: FAMILY MEDICINE

## 2025-03-18 PROCEDURE — 3074F SYST BP LT 130 MM HG: CPT | Performed by: FAMILY MEDICINE

## 2025-03-18 PROCEDURE — 99213 OFFICE O/P EST LOW 20 MIN: CPT | Performed by: FAMILY MEDICINE

## 2025-03-18 PROCEDURE — 3079F DIAST BP 80-89 MM HG: CPT | Performed by: FAMILY MEDICINE

## 2025-03-18 PROCEDURE — 1036F TOBACCO NON-USER: CPT | Performed by: FAMILY MEDICINE

## 2025-03-18 RX ORDER — HYDROCHLOROTHIAZIDE 12.5 MG/1
12.5 TABLET ORAL DAILY
Qty: 90 TABLET | Refills: 3 | Status: SHIPPED | OUTPATIENT
Start: 2025-03-18

## 2025-03-18 RX ORDER — TIZANIDINE 4 MG/1
4 TABLET ORAL 2 TIMES DAILY PRN
Qty: 180 TABLET | Refills: 3 | Status: SHIPPED | OUTPATIENT
Start: 2025-03-18 | End: 2026-03-13

## 2025-03-18 RX ORDER — ALLOPURINOL 300 MG/1
150 TABLET ORAL DAILY
Qty: 45 TABLET | Refills: 3 | Status: SHIPPED | OUTPATIENT
Start: 2025-03-18

## 2025-03-18 NOTE — PROGRESS NOTES
"Subjective   Patient ID: John Garvin is a 70 y.o. male who presents for Hypertension (6 month follow up ).  HPI  Pleasant 70-year-old gentleman had a January of this year PE to the right middle right lower lobe and remains on 20 mg of Xarelto with all his coagulopathy test negative except for lupus anticoagulant I did suggest formalizing appointment with hematology to assess the need for chronic anticoagulation  At this time has no chest pain shortness of breath pleuritic pain no orthopnea no peripheral edema and no asymmetric edema.  Observing good routines with history of uric acid kidney stones remain his hydrochlorothiazide 12.5 mg daily ---no flank pain no hematuria no new GI or  issues.  Otherwise observing healthy routines.  On allopurinol/ hyperuricemia.  Remains active without exertional dyspnea palpitations or chest pain.  Chronic meds reviewed no reported side effects  Review of Systems   Constitutional:  Negative for fatigue and fever.   Eyes:  Negative for visual disturbance.   Respiratory:  Negative for cough, chest tightness and shortness of breath.    Cardiovascular:  Negative for chest pain, palpitations and leg swelling.   Gastrointestinal:  Negative for abdominal pain and blood in stool.   Genitourinary:  Negative for dysuria, frequency and hematuria.   Musculoskeletal:  Positive for arthralgias and myalgias.   Skin:  Negative for rash.   Neurological:  Negative for weakness and headaches.   Psychiatric/Behavioral:  Negative for behavioral problems and sleep disturbance.        Objective   /82 (BP Location: Right arm, Patient Position: Sitting, BP Cuff Size: Large adult)   Pulse 80   Temp 36.1 °C (97 °F) (Temporal)   Resp 16   Ht 1.803 m (5' 11\")   Wt 101 kg (222 lb)   SpO2 97%   BMI 30.96 kg/m²    Contains abnormal data Basic metabolic panel  Order: [id number]   Status: Final result       Visible to patient: Yes (seen)       Dx: Hypokalemia    4 Result Notes          "   Component  Ref Range & Units 1 mo ago  (1/23/25) 2 mo ago  (1/12/25) 7 mo ago  (8/15/24) 1 yr ago  (8/24/23) 2 yr ago  (2/14/23) 2 yr ago  (1/5/23) 3 yr ago  (1/28/22)   Glucose  74 - 99 mg/dL 85 95 92 82 94 87 98   Sodium  136 - 145 mmol/L 139 141 141 138 140 138 140   Potassium  3.5 - 5.3 mmol/L 4.5 3.1 Low  4.3 4.1 4.2 3.7 4.4   Chloride  98 - 107 mmol/L 106 111 High  107 101 104 104 103   Bicarbonate  21 - 32 mmol/L 30 21 26 29 28 26 31   Anion Gap  10 - 20 mmol/L 8 Low  12 12 12 12 12 10   Urea Nitrogen  6 - 23 mg/dL 17 16 25 High  20 22 16 16   Creatinine  0.50 - 1.30 mg/dL 0.96 0.68 0.88 0.90 1.03 0.87 0.97   eGFR  >60 mL/min/1.73m*2 85 >90 CM >90 CM       Comment: Calculations of estimated GFR are performed using the 2021 CKD-EPI Study Refit equation without the race variable for the IDMS-Traceable creatinine methods.  https://jasn.asnjournals.org/content/early/2021/09/22/ASN.5557337830   Calcium  8.6 - 10.3 mg/dL 9.7 7.5 Low  9.1 10.0 10.2 9.5 9.4   Resulting               upus Anticoagulant With Interpretation [CORTES]  Order: [id number]   Status: Final result       Visible to patient: Yes (seen)       Dx: History of coagulopathy; Complication...    2 Result Notes      Component  Ref Range & Units 1 mo ago   DRVVT Screen  RATIO 2.24   DRVVT Confirmation  RATIO 1.66   DRVVT Test Ratio  <=1.20 RATIO 1.35 High    SCT Screen  RATIO 1.19   SCT Confirmation  RATIO 1.50   SCT Test Ratio  <=1.16 RATIO 0.79   Lupus Anticoagulant Interpretation There is evidence supportive of the presence of a lupus anticoagulant in one or both of thes      DL cholesterol, direct  Order: [id number]   Status: Final result       Visible to patient: Yes (seen)       Dx: Dyslipidemia, goal LDL below 130    2 Result Notes       1 HM Topic         Component  Ref Range & Units 7 mo ago 2 yr ago 3 yr ago 5 yr ago   LDL, Direct  0 - 129 mg/dL 125 147 High   High   High  CM   Resulting Agency [facility]               Narrative    PSA  Order: [id number]   Status: Final result       Visible to patient: Yes (seen)       Dx: Prostate cancer screening    2 Result Notes       Component  Ref Range & Units 7 mo ago 5 yr ago   Prostate Specific AG  <=4.00 ng/mL 2.05 1.84 R, CM   Resulting Agency [facility]              Narrative  Performed by: [facility]  The FDA requires that the method used for PSA assay be reported to the y       Physical Exam  Vital sign reviewed and normal  General Inspection was a pleasant interactive individual in no acute distress  Neck neck is supple without mass adenopathy bruits rigidity  Cardiovascular RSR without murmurs gallop or ectopy  Pulmonary chest clear without wheezing rales or rhonchi  GI-no organomegaly mass or rebound or bruits of the mid upper abdomen no CVA tenderness  Peripheral vascular no symmetric or asymmetric edema distal pulses are intact without motor or sensory or vascular deficits  Mood mood is stable without anxiety depressive or cognitive issues      Assessment/Plan   Problem List Items Addressed This Visit       Chronic midline back pain    Hyperuricemia    Left renal stone   Has done well with controlling his uric acid and preventing uric acid stones with a combination of allopurinol and low-dose hydrochlorothiazide  The patient is under well on Xarelto for January PE.  With positive one of his lupus anticoagulant antibodies we will refer to hematology to see if need for chronic anticoagulation beyond 6 months.  Otherwise continue healthy routines been stable cholesterols been stable as well as remainder of chemistries.  Continue increasing liquids low salt as well as low fat diet.  Will recheck in 3 months to review the above opinion from hematology has done well on Xarelto so we will check an about 3 months  @discharge  The above diagnosis and treatment plan was discussed with the patient patient will continue appropriate diet and exercise as reviewed  Patient will recheck  earlier if any interval problems of significance or clinical worsening of the above problems.  Agrees above surveillance.  All question were addressed regarding above meds

## 2025-03-23 PROBLEM — I26.09 OTHER PULMONARY EMBOLISM WITH ACUTE COR PULMONALE: Status: ACTIVE | Noted: 2025-03-23

## 2025-03-23 ASSESSMENT — ENCOUNTER SYMPTOMS
WEAKNESS: 0
SHORTNESS OF BREATH: 0
ARTHRALGIAS: 1
SLEEP DISTURBANCE: 0
BLOOD IN STOOL: 0
COUGH: 0
FEVER: 0
PALPITATIONS: 0
FREQUENCY: 0
HEMATURIA: 0
ABDOMINAL PAIN: 0
DYSURIA: 0
CHEST TIGHTNESS: 0
MYALGIAS: 1
HEADACHES: 0
FATIGUE: 0

## 2025-04-02 ENCOUNTER — OFFICE VISIT (OUTPATIENT)
Dept: PRIMARY CARE | Facility: CLINIC | Age: 70
End: 2025-04-02
Payer: MEDICARE

## 2025-04-02 VITALS
TEMPERATURE: 96.4 F | HEIGHT: 71 IN | HEART RATE: 85 BPM | BODY MASS INDEX: 30.52 KG/M2 | WEIGHT: 218 LBS | SYSTOLIC BLOOD PRESSURE: 122 MMHG | RESPIRATION RATE: 16 BRPM | OXYGEN SATURATION: 98 % | DIASTOLIC BLOOD PRESSURE: 80 MMHG

## 2025-04-02 DIAGNOSIS — J01.00 ACUTE NON-RECURRENT MAXILLARY SINUSITIS: ICD-10-CM

## 2025-04-02 DIAGNOSIS — K59.00 DYSCHEZIA: ICD-10-CM

## 2025-04-02 DIAGNOSIS — K62.5 RECTAL BLEEDING: Primary | ICD-10-CM

## 2025-04-02 PROCEDURE — 1159F MED LIST DOCD IN RCRD: CPT | Performed by: FAMILY MEDICINE

## 2025-04-02 PROCEDURE — 1123F ACP DISCUSS/DSCN MKR DOCD: CPT | Performed by: FAMILY MEDICINE

## 2025-04-02 PROCEDURE — 3079F DIAST BP 80-89 MM HG: CPT | Performed by: FAMILY MEDICINE

## 2025-04-02 PROCEDURE — 3008F BODY MASS INDEX DOCD: CPT | Performed by: FAMILY MEDICINE

## 2025-04-02 PROCEDURE — 99213 OFFICE O/P EST LOW 20 MIN: CPT | Performed by: FAMILY MEDICINE

## 2025-04-02 PROCEDURE — 3074F SYST BP LT 130 MM HG: CPT | Performed by: FAMILY MEDICINE

## 2025-04-02 RX ORDER — LEVOFLOXACIN 500 MG/1
500 TABLET, FILM COATED ORAL
Qty: 7 TABLET | Refills: 0 | Status: SHIPPED | OUTPATIENT
Start: 2025-04-02 | End: 2025-04-09

## 2025-04-02 RX ORDER — TRIAMCINOLONE ACETONIDE 1 MG/G
OINTMENT TOPICAL 2 TIMES DAILY PRN
Qty: 15 G | Refills: 0 | Status: SHIPPED | OUTPATIENT
Start: 2025-04-02 | End: 2025-07-31

## 2025-04-02 NOTE — PROGRESS NOTES
"Subjective   Patient ID: John Garvin is a 70 y.o. male who presents for Rectal Pain.  HPI    Review of Systems    Objective   /80 (BP Location: Right arm, Patient Position: Sitting, BP Cuff Size: Large adult)   Pulse 85   Temp 35.8 °C (96.4 °F) (Temporal)   Resp 16   Ht 1.803 m (5' 11\")   Wt 98.9 kg (218 lb)   SpO2 98%   BMI 30.40 kg/m²           Physical Exam        Assessment/Plan   Problem List Items Addressed This Visit    None    " exam.  Stool is brown Hemoccult negative  Peripheral vascular no symmetric asymmetric edema pulses are intact      Assessment/Plan   Problem List Items Addressed This Visit    None  History of intermittent rectal bleeding intermittent pain will refer to general surgery for sigmoidoscopy and/or colonoscopy.  He will need to be off his Xarelto at least 3 days before his colonoscopy with his history of PE  He has an upcoming appointment with hematology to check need for ongoing anticoagulation for coagulopathy.  Otherwise no abdominal pain and will continue the above chronic medicines.  Will follow-up in 6 weeks reviewed the above GI evaluation to rule out other causes of rectal bleeding he agrees to this important clarification  @discharge  The above diagnosis and treatment plan was discussed with the patient patient will continue appropriate diet and exercise as reviewed  Patient will recheck earlier if any interval problems of significance or clinical worsening of the above problems.  Agrees above surveillance.  All question were addressed regarding above meds

## 2025-04-04 ENCOUNTER — PATIENT OUTREACH (OUTPATIENT)
Dept: PRIMARY CARE | Facility: CLINIC | Age: 70
End: 2025-04-04
Payer: MEDICARE

## 2025-04-04 NOTE — PROGRESS NOTES
Final follow up call to patient. He feels he is doing well. He continues to take all medications as ordered. He has appt with Hematology on 4-11-25 to discuss need for having to continue with blood thinners and possible further testing. He states he is feeling good. No further issues after ED visit for branch hitting him on the head. He is aware at anytime he hits his head on anything he is to go to ER. Offered CCM services. He does not feel he needs it at this time. No questions or issues at this time.

## 2025-04-07 ASSESSMENT — ENCOUNTER SYMPTOMS
HEADACHES: 0
BLOOD IN STOOL: 1
ABDOMINAL DISTENTION: 0
RECTAL PAIN: 1
SLEEP DISTURBANCE: 0
COUGH: 0
WEAKNESS: 0
PALPITATIONS: 0
DIARRHEA: 0
ABDOMINAL PAIN: 0
SHORTNESS OF BREATH: 0
VOMITING: 0
FEVER: 0
CHEST TIGHTNESS: 0
FREQUENCY: 0
HEMATURIA: 0
FATIGUE: 0
DYSURIA: 0
MYALGIAS: 0
CONSTIPATION: 0
ARTHRALGIAS: 0

## 2025-04-11 ENCOUNTER — HOSPITAL ENCOUNTER (OUTPATIENT)
Dept: CARDIOLOGY | Facility: HOSPITAL | Age: 70
Discharge: HOME | End: 2025-04-11
Payer: MEDICARE

## 2025-04-11 ENCOUNTER — LAB (OUTPATIENT)
Dept: LAB | Facility: CLINIC | Age: 70
End: 2025-04-11
Payer: MEDICARE

## 2025-04-11 ENCOUNTER — OFFICE VISIT (OUTPATIENT)
Dept: HEMATOLOGY/ONCOLOGY | Facility: CLINIC | Age: 70
End: 2025-04-11
Payer: MEDICARE

## 2025-04-11 ENCOUNTER — HOSPITAL ENCOUNTER (OUTPATIENT)
Dept: RADIOLOGY | Facility: HOSPITAL | Age: 70
Discharge: HOME | End: 2025-04-11
Payer: MEDICARE

## 2025-04-11 VITALS
BODY MASS INDEX: 30.75 KG/M2 | OXYGEN SATURATION: 95 % | WEIGHT: 220.46 LBS | TEMPERATURE: 97.5 F | RESPIRATION RATE: 16 BRPM | HEART RATE: 76 BPM | DIASTOLIC BLOOD PRESSURE: 82 MMHG | SYSTOLIC BLOOD PRESSURE: 158 MMHG

## 2025-04-11 DIAGNOSIS — N20.0 RECURRENT NEPHROLITHIASIS: ICD-10-CM

## 2025-04-11 DIAGNOSIS — E79.0 HYPERURICEMIA: ICD-10-CM

## 2025-04-11 DIAGNOSIS — R60.0 BILATERAL LEG EDEMA: ICD-10-CM

## 2025-04-11 DIAGNOSIS — I26.09 OTHER PULMONARY EMBOLISM WITH ACUTE COR PULMONALE, UNSPECIFIED CHRONICITY (MULTI): ICD-10-CM

## 2025-04-11 DIAGNOSIS — I26.09 OTHER PULMONARY EMBOLISM WITH ACUTE COR PULMONALE, UNSPECIFIED CHRONICITY (MULTI): Primary | ICD-10-CM

## 2025-04-11 DIAGNOSIS — I26.99 OTHER PULMONARY EMBOLISM WITHOUT ACUTE COR PULMONALE: ICD-10-CM

## 2025-04-11 DIAGNOSIS — M79.89 OTHER SPECIFIED SOFT TISSUE DISORDERS: ICD-10-CM

## 2025-04-11 LAB
CREAT SERPL-MCNC: 0.85 MG/DL (ref 0.5–1.3)
EGFRCR SERPLBLD CKD-EPI 2021: >90 ML/MIN/1.73M*2

## 2025-04-11 PROCEDURE — G2211 COMPLEX E/M VISIT ADD ON: HCPCS | Performed by: INTERNAL MEDICINE

## 2025-04-11 PROCEDURE — 71275 CT ANGIOGRAPHY CHEST: CPT

## 2025-04-11 PROCEDURE — 82565 ASSAY OF CREATININE: CPT

## 2025-04-11 PROCEDURE — 1123F ACP DISCUSS/DSCN MKR DOCD: CPT | Performed by: INTERNAL MEDICINE

## 2025-04-11 PROCEDURE — 3077F SYST BP >= 140 MM HG: CPT | Performed by: INTERNAL MEDICINE

## 2025-04-11 PROCEDURE — 93970 EXTREMITY STUDY: CPT

## 2025-04-11 PROCEDURE — 2550000001 HC RX 255 CONTRASTS: Performed by: INTERNAL MEDICINE

## 2025-04-11 PROCEDURE — 1125F AMNT PAIN NOTED PAIN PRSNT: CPT | Performed by: INTERNAL MEDICINE

## 2025-04-11 PROCEDURE — 1159F MED LIST DOCD IN RCRD: CPT | Performed by: INTERNAL MEDICINE

## 2025-04-11 PROCEDURE — 99204 OFFICE O/P NEW MOD 45 MIN: CPT | Performed by: INTERNAL MEDICINE

## 2025-04-11 PROCEDURE — 3079F DIAST BP 80-89 MM HG: CPT | Performed by: INTERNAL MEDICINE

## 2025-04-11 PROCEDURE — 36415 COLL VENOUS BLD VENIPUNCTURE: CPT

## 2025-04-11 PROCEDURE — 93970 EXTREMITY STUDY: CPT | Performed by: STUDENT IN AN ORGANIZED HEALTH CARE EDUCATION/TRAINING PROGRAM

## 2025-04-11 PROCEDURE — 1160F RVW MEDS BY RX/DR IN RCRD: CPT | Performed by: INTERNAL MEDICINE

## 2025-04-11 PROCEDURE — 99214 OFFICE O/P EST MOD 30 MIN: CPT | Mod: 25 | Performed by: INTERNAL MEDICINE

## 2025-04-11 RX ADMIN — IOHEXOL 75 ML: 350 INJECTION, SOLUTION INTRAVENOUS at 15:59

## 2025-04-11 ASSESSMENT — PAIN SCALES - GENERAL: PAINLEVEL_OUTOF10: 6

## 2025-04-11 NOTE — PATIENT INSTRUCTIONS
Continue taking the xarelto for now    You will have a followup CT angio of the chest and doppler of the legs done first

## 2025-04-11 NOTE — PROGRESS NOTES
Patient ID: John Garvin is a 70 y.o. male.  Referring Physician: Alexander Garza DO  [address] [phone]  Primary Care Provider: Alexander Garza DO  Visit Type: Initial Visit      Subjective    HPI How long do I need to take the xarelto?  Can you check my legs?    Review of Systems   Constitutional: Negative.    HENT:  Negative.     Eyes: Negative.    Respiratory: Negative.     Cardiovascular: Negative.    Gastrointestinal: Negative.    Endocrine: Negative.    Genitourinary: Negative.     Musculoskeletal: Negative.    Skin: Negative.    Neurological: Negative.    Hematological: Negative.    Psychiatric/Behavioral: Negative.          Objective   BSA: 2.24 meters squared  /82 (BP Location: Left arm)   Pulse 76   Temp 36.4 °C (97.5 °F) (Temporal)   Resp 16   Wt 100 kg (220 lb 7.4 oz)   SpO2 95%   BMI 30.75 kg/m²      has a past medical history of Acute maxillary sinusitis, unspecified (11/27/2019), Calculus of kidney (03/14/2019), Calculus of kidney (03/10/2020), Cervicogenic headache (12/22/2020), Dorsalgia, unspecified, Epigastric pain (05/29/2020), Erectile dysfunction, Ganglion, right hand (01/04/2023), Insect bite (nonvenomous) of other part of head, initial encounter (09/08/2021), Pain due to genitourinary prosthetic devices, implants and grafts, initial encounter, Personal history of other diseases of the musculoskeletal system and connective tissue, Personal history of other diseases of urinary system (03/10/2020), Personal history of other specified conditions (08/20/2021), Personal history of other specified conditions (02/19/2020), Personal history of urinary calculi (06/25/2020), Personal history of urinary calculi, and Sleep apnea.   has a past surgical history that includes Other surgical history (03/14/2019); Other surgical history (03/14/2019); Other surgical history (12/14/2022); Other surgical history (06/30/2022); Other surgical history (07/16/2021); Other surgical history  (05/20/2019); Other surgical history (05/20/2019); and Kidney stone surgery (1985 / 2021).  Family History   Problem Relation Name Age of Onset    Lung cancer Father Jovany Garvin     Breast cancer Father Jovany Garvin     Other (cardiac disorder) Mother Fabiola Garvin     Heart disease Mother Fabiola Garvin     Breast cancer Sister      Other (cardiac disorder) Brother      Other (bladder cancer) Brother       Oncology History    No history exists.       John Garvin  reports that he has never smoked. He has been exposed to tobacco smoke. He has never used smokeless tobacco.  He  reports no history of alcohol use.  He  reports no history of drug use.    Physical Exam  Vitals reviewed.   Constitutional:       Appearance: Normal appearance.   HENT:      Head: Normocephalic.      Mouth/Throat:      Mouth: Mucous membranes are moist.   Eyes:      Extraocular Movements: Extraocular movements intact.      Pupils: Pupils are equal, round, and reactive to light.   Cardiovascular:      Rate and Rhythm: Normal rate and regular rhythm.      Pulses: Normal pulses.      Heart sounds: Normal heart sounds.   Pulmonary:      Effort: Pulmonary effort is normal.      Breath sounds: Normal breath sounds.   Abdominal:      General: Bowel sounds are normal.      Palpations: Abdomen is soft.   Musculoskeletal:         General: Normal range of motion.      Cervical back: Normal range of motion and neck supple.   Skin:     General: Skin is warm.   Neurological:      General: No focal deficit present.      Mental Status: He is alert and oriented to person, place, and time.   Psychiatric:         Mood and Affect: Mood normal.         Behavior: Behavior normal.         WBC   Date/Time Value Ref Range Status   01/12/2025 03:04 AM 7.2 4.4 - 11.3 x10*3/uL Final   08/24/2023 12:10 PM 9.1 4.4 - 11.3 x10E9/L Final   01/05/2023 11:07 AM 6.3 4.4 - 11.3 x10E9/L Final   11/21/2020 05:55 AM 10.7 4.4 - 11.3 x10E9/L Final     nRBC   Date Value Ref Range  "Status   01/12/2025 0.0 0.0 - 0.0 /100 WBCs Final   08/24/2023 0.0 0.0 - 0.0 /100 WBC Final   11/21/2020 0.0 0.0 - 0.0 /100 WBC Final   11/09/2020 0.0 0.0 - 0.0 /100 WBC Final     RBC   Date Value Ref Range Status   01/12/2025 4.25 (L) 4.50 - 5.90 x10*6/uL Final   08/24/2023 5.02 4.50 - 5.90 x10E12/L Final   01/05/2023 4.89 4.50 - 5.90 x10E12/L Final   11/21/2020 4.23 (L) 4.50 - 5.90 x10E12/L Final     Hemoglobin   Date Value Ref Range Status   01/12/2025 12.6 (L) 13.5 - 17.5 g/dL Final   08/24/2023 14.8 13.5 - 17.5 g/dL Final   01/05/2023 14.6 13.5 - 17.5 g/dL Final   11/21/2020 12.5 (L) 13.5 - 17.5 g/dL Final     Hematocrit   Date Value Ref Range Status   01/12/2025 39.0 (L) 41.0 - 52.0 % Final   08/24/2023 45.6 41.0 - 52.0 % Final   01/05/2023 44.1 41.0 - 52.0 % Final   11/21/2020 39.5 (L) 41.0 - 52.0 % Final     MCV   Date/Time Value Ref Range Status   01/12/2025 03:04 AM 92 80 - 100 fL Final   08/24/2023 12:10 PM 91 80 - 100 fL Final   01/05/2023 11:07 AM 90 80 - 100 fL Final   11/21/2020 05:55 AM 93 80 - 100 fL Final     MCH   Date/Time Value Ref Range Status   01/12/2025 03:04 AM 29.6 26.0 - 34.0 pg Final     MCHC   Date/Time Value Ref Range Status   01/12/2025 03:04 AM 32.3 32.0 - 36.0 g/dL Final   08/24/2023 12:10 PM 32.5 32.0 - 36.0 g/dL Final   01/05/2023 11:07 AM 33.1 32.0 - 36.0 g/dL Final   11/21/2020 05:55 AM 31.6 (L) 32.0 - 36.0 g/dL Final     RDW   Date/Time Value Ref Range Status   01/12/2025 03:04 AM 12.5 11.5 - 14.5 % Final   08/24/2023 12:10 PM 12.5 11.5 - 14.5 % Final   01/05/2023 11:07 AM 12.3 11.5 - 14.5 % Final   11/21/2020 05:55 AM 12.2 11.5 - 14.5 % Final     Platelets   Date/Time Value Ref Range Status   01/12/2025 03:04  150 - 450 x10*3/uL Final   08/24/2023 12:10  150 - 450 x10E9/L Final   01/05/2023 11:07  150 - 450 x10E9/L Final   11/21/2020 05:55  150 - 450 x10E9/L Final     No results found for: \"MPV\"  Neutrophils %   Date/Time Value Ref Range Status "   01/12/2025 03:04 AM 50.7 40.0 - 80.0 % Final   08/24/2023 12:10 PM 67.7 40.0 - 80.0 % Final   01/05/2023 11:07 AM 59.5 40.0 - 80.0 % Final   10/20/2020 09:44 AM 50.4 40.0 - 80.0 % Final     Immature Granulocytes %, Automated   Date/Time Value Ref Range Status   01/12/2025 03:04 AM 0.4 0.0 - 0.9 % Final     Comment:     Immature Granulocyte Count (IG) includes promyelocytes, myelocytes and metamyelocytes but does not include bands. Percent differential counts (%) should be interpreted in the context of the absolute cell counts (cells/UL).   08/24/2023 12:10 PM 0.4 0.0 - 0.9 % Final     Comment:      Immature Granulocyte Count (IG) includes promyelocytes,    myelocytes and metamyelocytes but does not include bands.   Percent differential counts (%) should be interpreted in the   context of the absolute cell counts (cells/L).     01/05/2023 11:07 AM 0.3 0.0 - 0.9 % Final     Comment:      Immature Granulocyte Count (IG) includes promyelocytes,    myelocytes and metamyelocytes but does not include bands.   Percent differential counts (%) should be interpreted in the   context of the absolute cell counts (cells/L).     10/20/2020 09:44 AM 0.2 0.0 - 0.9 % Final     Comment:      Immature Granulocyte Count (IG) includes promyelocytes,    myelocytes and metamyelocytes but does not include bands.   Percent differential counts (%) should be interpreted in the   context of the absolute cell counts (cells/L).       Lymphocytes %   Date/Time Value Ref Range Status   01/12/2025 03:04 AM 33.3 13.0 - 44.0 % Final   08/24/2023 12:10 PM 21.5 13.0 - 44.0 % Final   01/05/2023 11:07 AM 26.1 13.0 - 44.0 % Final   10/20/2020 09:44 AM 31.4 13.0 - 44.0 % Final     Monocytes %   Date/Time Value Ref Range Status   01/12/2025 03:04 AM 9.0 2.0 - 10.0 % Final   08/24/2023 12:10 PM 9.0 2.0 - 10.0 % Final   01/05/2023 11:07 AM 10.0 2.0 - 10.0 % Final   10/20/2020 09:44 AM 10.1 2.0 - 10.0 % Final     Eosinophils %   Date/Time Value Ref Range  Status   01/12/2025 03:04 AM 5.8 0.0 - 6.0 % Final   08/24/2023 12:10 PM 0.9 0.0 - 6.0 % Final   01/05/2023 11:07 AM 3.3 0.0 - 6.0 % Final   10/20/2020 09:44 AM 6.8 0.0 - 6.0 % Final     Basophils %   Date/Time Value Ref Range Status   01/12/2025 03:04 AM 0.8 0.0 - 2.0 % Final   08/24/2023 12:10 PM 0.5 0.0 - 2.0 % Final   01/05/2023 11:07 AM 0.8 0.0 - 2.0 % Final   10/20/2020 09:44 AM 1.1 0.0 - 2.0 % Final     Neutrophils Absolute   Date/Time Value Ref Range Status   01/12/2025 03:04 AM 3.67 1.60 - 5.50 x10*3/uL Final     Comment:     Percent differential counts (%) should be interpreted in the context of the absolute cell counts (cells/uL).   08/24/2023 12:10 PM 6.18 1.20 - 7.70 x10E9/L Final   01/05/2023 11:07 AM 3.77 1.20 - 7.70 x10E9/L Final   10/20/2020 09:44 AM 2.39 1.20 - 7.70 x10E9/L Final     Immature Granulocytes Absolute, Automated   Date/Time Value Ref Range Status   01/12/2025 03:04 AM 0.03 0.00 - 0.50 x10*3/uL Final     Lymphocytes Absolute   Date/Time Value Ref Range Status   01/12/2025 03:04 AM 2.41 0.80 - 3.00 x10*3/uL Final   08/24/2023 12:10 PM 1.96 1.20 - 4.80 x10E9/L Final   01/05/2023 11:07 AM 1.65 1.20 - 4.80 x10E9/L Final   10/20/2020 09:44 AM 1.49 1.20 - 4.80 x10E9/L Final     Monocytes Absolute   Date/Time Value Ref Range Status   01/12/2025 03:04 AM 0.65 0.05 - 0.80 x10*3/uL Final   08/24/2023 12:10 PM 0.82 0.10 - 1.00 x10E9/L Final   01/05/2023 11:07 AM 0.63 0.10 - 1.00 x10E9/L Final   10/20/2020 09:44 AM 0.48 0.10 - 1.00 x10E9/L Final     Eosinophils Absolute   Date/Time Value Ref Range Status   01/12/2025 03:04 AM 0.42 (H) 0.00 - 0.40 x10*3/uL Final   08/24/2023 12:10 PM 0.08 0.00 - 0.70 x10E9/L Final   01/05/2023 11:07 AM 0.21 0.00 - 0.70 x10E9/L Final   10/20/2020 09:44 AM 0.32 0.00 - 0.70 x10E9/L Final     Basophils Absolute   Date/Time Value Ref Range Status   01/12/2025 03:04 AM 0.06 0.00 - 0.10 x10*3/uL Final   08/24/2023 12:10 PM 0.05 0.00 - 0.10 x10E9/L Final   01/05/2023 11:07  "AM 0.05 0.00 - 0.10 x10E9/L Final   10/20/2020 09:44 AM 0.05 0.00 - 0.10 x10E9/L Final       No components found for: \"PT\"  aPTT   Date/Time Value Ref Range Status   01/05/2023 11:07 AM 28 26 - 39 sec Final     Comment:       THE APTT IS NO LONGER USED FOR MONITORING     UNFRACTIONATED HEPARIN THERAPY.    FOR MONITORING HEPARIN THERAPY,     USE THE HEPARIN ASSAY.     11/09/2020 10:29 AM 27 25 - 35 sec Final     Comment:       THE APTT IS NO LONGER USED FOR MONITORING     UNFRACTIONATED HEPARIN THERAPY.    FOR MONITORING HEPARIN THERAPY,     USE THE HEPARIN ASSAY.       Medication Documentation Review Audit       Reviewed by Ann Marie Garcia MA (Medical Assistant) on 04/11/25 at 1310      Medication Order Taking? Sig Documenting Provider Last Dose Status   acetaminophen (TYLENOL ORAL) [id number] Yes Take 650 mg by mouth 3 times a day. CAPS Matthew Romano MD 1/11/2025 Active   allopurinol (Zyloprim) 300 mg tablet [id number] Yes Take 0.5 tablets (150 mg) by mouth once daily. Alexander Garza DO  Active   cetirizine (ZyrTEC) 10 mg tablet [id number] Yes Take 1 tablet (10 mg) by mouth once daily. Matthew Provider, MD 1/11/2025 Active   cholecalciferol (Vitamin D-3) 50 mcg (2,000 unit) capsule [id number] Yes Take 1 capsule (50 mcg) by mouth once daily. Matthew Provider, MD 1/11/2025 Active   fish oil concentrate (Omega-3) 120-180 mg capsule [id number] No Take 1 tablet by mouth 1 (one) time each day.   Patient not taking: Reported on 4/11/2025    Matthew Provider, MD 1/11/2025 Active   fluticasone (Flonase) 50 mcg/actuation nasal spray [id number] Yes Administer 1 spray into each nostril once daily. Matthew Provider, MD 1/11/2025 Active   hydroCHLOROthiazide (Microzide) 12.5 mg tablet [id number] Yes Take 1 tablet (12.5 mg) by mouth once daily. Alexander Garza DO  Active   levoFLOXacin (Levaquin) 500 mg tablet [id number]  Take 1 tablet (500 mg) by mouth once daily at noon. Take before meals. for 7 days. Alexander Garza, DO  "  25 [id number]   magnesium glycinate 100 mg tablet [id number] Yes Take 500 mg by mouth once daily at bedtime. Historical Provider, MD  Active   melatonin 3 mg tablet [id number] Yes Take 1 tablet (3 mg) by mouth once daily at bedtime. Historical Provider, MD 2025 Active   multivitamin tablet [id number] Yes Take 1 tablet by mouth once daily. Historical Provider, MD 2025 Active   potassium citrate CR (Urocit-K-15) 15 mEq ER tablet [id number] Yes Take 1 tablet (15 mEq) by mouth once daily. Historical Provider, MD 2025 Active   rivaroxaban (Xarelto) 20 mg tablet [id number] Yes Take 1 tablet (20 mg) by mouth once daily. Take with food. Chanelle Valdes MD  Active   tiZANidine (Zanaflex) 4 mg tablet [id number] Yes Take 1 tablet (4 mg) by mouth 2 times a day as needed for muscle spasms. Alexander Garza, DO  Active   triamcinolone (Kenalog) 0.1 % ointment [id number]  Apply topically 2 times a day as needed for irritation or rash. Alexander Garza, DO  Active   triamcinolone (Kenalog) 0.1 % ointment [id number] Yes Apply topically 2 times a day as needed for irritation or rash. Alexander Garza, DO  Active                   Assessment/Plan    1) pulmonary emboli  -on 2024 he underwent decompression laminectomy interbody fusion lumbar posterior level 1 by Dr Valverde at [facility]  -he was discharged home, but was not placed on any chemical thromboprophylaxis  -he was not very mobile for the first 2-3 weeks at home  -on 2025 he went to the ED for evaluation of right sided chest pain that woke him up from sleep  -CT angio of chest showed acute pulmonary arterial filling defects in right middle lobe and right lower lobe segmental branches of the pulmonary arteries; there are also acute filling defects in the left lower lobe segmental pulmonary arteries  -he was started on UFH drip  -ER discussed with MICU fellow Dr Patel who advised that the patient can remain at [facility] and also recommended doppler of his legs  -doppler of  legs was never performed  -he was discharged home on 1/12/2025 on xarelto, which comes in 15 and 20 mg tablets, but was instructed very bizarrely to take 6 x 2.5 mg tablets BID for 21 days then 8 x 2.5 mg tablets daily (when a starter pack comes with 15 mg and 20 mg tabs)  -on 1/23/2025 his PCP checked factor V Leiden (negative) and lupus anticoagulant (purportedly positive), however one should never screen for thrombophilia while actively anticoagulated (active anticoagulation, including xarelto, can induce false positive lupus anticoagulants)  -this was clearly a provoked VTE event, and he is nearing 3 month anniversary  -will check CT angio of chest and doppler of legs  -will need fresh creatinine to be drawn today  -he is also interested in being screened for inheritable thrombophilia but we do not do this while on active anticoagulation    2) hyperuricemia  -on allopurinol    3) nephrolithiasis  -uric acid stones  -on hydrochlorothiazide     Problem List Items Addressed This Visit             ICD-10-CM    Other pulmonary embolism with acute cor pulmonale I26.09    Relevant Orders    CT angio chest for pulmonary embolism (Completed)    Creatinine, Serum (Completed)    Vascular US lower extremity venous duplex bilateral (Completed)    Clinic Appointment Request Virtual Est; PATRICK RODRÍGUEZ; [facility]     Other Visit Diagnoses         Codes    Bilateral leg edema     R60.0    Relevant Orders    Vascular US lower extremity venous duplex bilateral (Completed)    Clinic Appointment Request Virtual Est; PATRICK RODRÍGUEZ; [facility]                 Patrick Rodríguez MD

## 2025-04-13 PROBLEM — N20.0 RECURRENT NEPHROLITHIASIS: Status: ACTIVE | Noted: 2025-04-13

## 2025-04-13 PROBLEM — R60.0 BILATERAL LEG EDEMA: Status: ACTIVE | Noted: 2025-04-13

## 2025-04-13 ASSESSMENT — ENCOUNTER SYMPTOMS
EYES NEGATIVE: 1
CARDIOVASCULAR NEGATIVE: 1
HEMATOLOGIC/LYMPHATIC NEGATIVE: 1
PSYCHIATRIC NEGATIVE: 1
CONSTITUTIONAL NEGATIVE: 1
RESPIRATORY NEGATIVE: 1
ENDOCRINE NEGATIVE: 1
GASTROINTESTINAL NEGATIVE: 1
MUSCULOSKELETAL NEGATIVE: 1
NEUROLOGICAL NEGATIVE: 1

## 2025-04-14 ENCOUNTER — APPOINTMENT (OUTPATIENT)
Dept: SURGERY | Facility: CLINIC | Age: 70
End: 2025-04-14
Payer: MEDICARE

## 2025-04-14 VITALS
WEIGHT: 220 LBS | DIASTOLIC BLOOD PRESSURE: 90 MMHG | BODY MASS INDEX: 30.8 KG/M2 | SYSTOLIC BLOOD PRESSURE: 174 MMHG | HEIGHT: 71 IN

## 2025-04-14 DIAGNOSIS — K59.00 DYSCHEZIA: ICD-10-CM

## 2025-04-14 DIAGNOSIS — K62.5 RECTAL BLEEDING: Primary | ICD-10-CM

## 2025-04-14 PROCEDURE — 1123F ACP DISCUSS/DSCN MKR DOCD: CPT | Performed by: SURGERY

## 2025-04-14 PROCEDURE — 3080F DIAST BP >= 90 MM HG: CPT | Performed by: SURGERY

## 2025-04-14 PROCEDURE — 3077F SYST BP >= 140 MM HG: CPT | Performed by: SURGERY

## 2025-04-14 PROCEDURE — 1159F MED LIST DOCD IN RCRD: CPT | Performed by: SURGERY

## 2025-04-14 PROCEDURE — 99203 OFFICE O/P NEW LOW 30 MIN: CPT | Performed by: SURGERY

## 2025-04-14 PROCEDURE — 3008F BODY MASS INDEX DOCD: CPT | Performed by: SURGERY

## 2025-04-14 PROCEDURE — 1160F RVW MEDS BY RX/DR IN RCRD: CPT | Performed by: SURGERY

## 2025-04-14 NOTE — PROGRESS NOTES
"Subjective   Patient ID: John Garvin is a 70 y.o. male who presents for New Patient Visit and Rectal Bleeding.  HPI  70-year-old male patient referred to me for rectal bleeding. History of PE (right middle lobe, right lower lobe and left lower lobe) since 1/2025 and has been on Xarelto. Colonoscopy in December 2014 showed diverticulosis.  Per patient, he has been having bright red blood on the tissue and in the commode with bowel movement; also reports intermittent constipation. Last episode was few days ago. \"Since my back surgery, I don't feel like going to the bathroom until lots of stool build up in my rectum\". Denies abdominal pain, nausea and vomiting. CT angio chest and duplex ultrasound negative for PE and DVT respectively.     Objective   Physical Exam  Constitutional: no acute distress, well appearing and well nourished  Eyes: conjunctiva and lids with no erythema, swelling or discharge; EOMI  Ears, Nose, Mouth and Throat: external inspection of ears and nose are normal  Pulmonary: normal respiratory effort; clear to auscultation bilaterally, no wheezes or bronchi   Cardiovascular: regular rate and rhythm, no murmurs or extra-heart sounds; pedal pulses are normal; no extremities edema or varicosities, no peripheral edema  Abdomen: soft, non-tender, non-distended  Musculoskeletal: digits and nails normal without clubbing or cyanosis; Joints, bones and muscles are normal with normal range of motion; muscle strength/tone is normal  Neurologic: cranial nerve II-XII intact grossly; normal gait  Psychiatric: oriented to person, place and time  Assessment/Plan   70-year-old male patient with rectal bleeding.  I reviewed the last colonoscopy in December 2014 and noted to have diverticulosis.  He is on Xarelto for PEs which were diagnosed January 2025.  CT chest and ultrasound negative for PE and DVT respectively.  Patient will undergo colonoscopy after he is off the Xarelto.  I explained to him the procedure, the " risks and complications which include but not limited to bleeding, infection and bowel injury.  All questions answered.         Brennon Samayoa MD 04/14/25 10:43 AM

## 2025-04-16 ENCOUNTER — TELEMEDICINE (OUTPATIENT)
Dept: HEMATOLOGY/ONCOLOGY | Facility: CLINIC | Age: 70
End: 2025-04-16
Payer: MEDICARE

## 2025-04-16 DIAGNOSIS — I26.99 PE (PULMONARY THROMBOEMBOLISM) (MULTI): Primary | ICD-10-CM

## 2025-04-16 DIAGNOSIS — N20.0 RECURRENT NEPHROLITHIASIS: ICD-10-CM

## 2025-04-16 DIAGNOSIS — E79.0 HYPERURICEMIA: ICD-10-CM

## 2025-04-16 PROCEDURE — RXMED WILLOW AMBULATORY MEDICATION CHARGE

## 2025-04-16 PROCEDURE — 1159F MED LIST DOCD IN RCRD: CPT | Performed by: INTERNAL MEDICINE

## 2025-04-16 PROCEDURE — 99214 OFFICE O/P EST MOD 30 MIN: CPT | Performed by: INTERNAL MEDICINE

## 2025-04-16 PROCEDURE — 1160F RVW MEDS BY RX/DR IN RCRD: CPT | Performed by: INTERNAL MEDICINE

## 2025-04-16 PROCEDURE — 1123F ACP DISCUSS/DSCN MKR DOCD: CPT | Performed by: INTERNAL MEDICINE

## 2025-04-16 NOTE — PROGRESS NOTES
Patient ID: John Garvin is a 70 y.o. male.  Referring Physician: No referring provider defined for this encounter.  Primary Care Provider: Alexander Garza DO  Visit Type: {Visit Type:27787}      Subjective    HPI    Review of Systems - Oncology     Objective   BSA: There is no height or weight on file to calculate BSA.  There were no vitals taken for this visit.     has a past medical history of Acute maxillary sinusitis, unspecified (11/27/2019), Calculus of kidney (03/14/2019), Calculus of kidney (03/10/2020), Cervicogenic headache (12/22/2020), Dorsalgia, unspecified, Epigastric pain (05/29/2020), Erectile dysfunction, Ganglion, right hand (01/04/2023), Insect bite (nonvenomous) of other part of head, initial encounter (09/08/2021), Pain due to genitourinary prosthetic devices, implants and grafts, initial encounter, Personal history of other diseases of the musculoskeletal system and connective tissue, Personal history of other diseases of urinary system (03/10/2020), Personal history of other specified conditions (08/20/2021), Personal history of other specified conditions (02/19/2020), Personal history of urinary calculi (06/25/2020), Personal history of urinary calculi, and Sleep apnea.   has a past surgical history that includes Other surgical history (03/14/2019); Other surgical history (03/14/2019); Other surgical history (12/14/2022); Other surgical history (06/30/2022); Other surgical history (07/16/2021); Other surgical history (05/20/2019); Other surgical history (05/20/2019); and Kidney stone surgery (1985 / 2021).  Family History[1]  Oncology History    No history exists.       John Garvin  reports that he has never smoked. He has been exposed to tobacco smoke. He has never used smokeless tobacco.  He  reports no history of alcohol use.  He  reports no history of drug use.    Physical Exam    WBC   Date/Time Value Ref Range Status   01/12/2025 03:04 AM 7.2 4.4 - 11.3 x10*3/uL Final   08/24/2023 12:10  PM 9.1 4.4 - 11.3 x10E9/L Final   01/05/2023 11:07 AM 6.3 4.4 - 11.3 x10E9/L Final   11/21/2020 05:55 AM 10.7 4.4 - 11.3 x10E9/L Final     nRBC   Date Value Ref Range Status   01/12/2025 0.0 0.0 - 0.0 /100 WBCs Final   08/24/2023 0.0 0.0 - 0.0 /100 WBC Final   11/21/2020 0.0 0.0 - 0.0 /100 WBC Final   11/09/2020 0.0 0.0 - 0.0 /100 WBC Final     RBC   Date Value Ref Range Status   01/12/2025 4.25 (L) 4.50 - 5.90 x10*6/uL Final   08/24/2023 5.02 4.50 - 5.90 x10E12/L Final   01/05/2023 4.89 4.50 - 5.90 x10E12/L Final   11/21/2020 4.23 (L) 4.50 - 5.90 x10E12/L Final     Hemoglobin   Date Value Ref Range Status   01/12/2025 12.6 (L) 13.5 - 17.5 g/dL Final   08/24/2023 14.8 13.5 - 17.5 g/dL Final   01/05/2023 14.6 13.5 - 17.5 g/dL Final   11/21/2020 12.5 (L) 13.5 - 17.5 g/dL Final     Hematocrit   Date Value Ref Range Status   01/12/2025 39.0 (L) 41.0 - 52.0 % Final   08/24/2023 45.6 41.0 - 52.0 % Final   01/05/2023 44.1 41.0 - 52.0 % Final   11/21/2020 39.5 (L) 41.0 - 52.0 % Final     MCV   Date/Time Value Ref Range Status   01/12/2025 03:04 AM 92 80 - 100 fL Final   08/24/2023 12:10 PM 91 80 - 100 fL Final   01/05/2023 11:07 AM 90 80 - 100 fL Final   11/21/2020 05:55 AM 93 80 - 100 fL Final     MCH   Date/Time Value Ref Range Status   01/12/2025 03:04 AM 29.6 26.0 - 34.0 pg Final     MCHC   Date/Time Value Ref Range Status   01/12/2025 03:04 AM 32.3 32.0 - 36.0 g/dL Final   08/24/2023 12:10 PM 32.5 32.0 - 36.0 g/dL Final   01/05/2023 11:07 AM 33.1 32.0 - 36.0 g/dL Final   11/21/2020 05:55 AM 31.6 (L) 32.0 - 36.0 g/dL Final     RDW   Date/Time Value Ref Range Status   01/12/2025 03:04 AM 12.5 11.5 - 14.5 % Final   08/24/2023 12:10 PM 12.5 11.5 - 14.5 % Final   01/05/2023 11:07 AM 12.3 11.5 - 14.5 % Final   11/21/2020 05:55 AM 12.2 11.5 - 14.5 % Final     Platelets   Date/Time Value Ref Range Status   01/12/2025 03:04  150 - 450 x10*3/uL Final   08/24/2023 12:10  150 - 450 x10E9/L Final   01/05/2023 11:07 AM  "200 150 - 450 x10E9/L Final   11/21/2020 05:55  150 - 450 x10E9/L Final     No results found for: \"MPV\"  Neutrophils %   Date/Time Value Ref Range Status   01/12/2025 03:04 AM 50.7 40.0 - 80.0 % Final   08/24/2023 12:10 PM 67.7 40.0 - 80.0 % Final   01/05/2023 11:07 AM 59.5 40.0 - 80.0 % Final   10/20/2020 09:44 AM 50.4 40.0 - 80.0 % Final     Immature Granulocytes %, Automated   Date/Time Value Ref Range Status   01/12/2025 03:04 AM 0.4 0.0 - 0.9 % Final     Comment:     Immature Granulocyte Count (IG) includes promyelocytes, myelocytes and metamyelocytes but does not include bands. Percent differential counts (%) should be interpreted in the context of the absolute cell counts (cells/UL).   08/24/2023 12:10 PM 0.4 0.0 - 0.9 % Final     Comment:      Immature Granulocyte Count (IG) includes promyelocytes,    myelocytes and metamyelocytes but does not include bands.   Percent differential counts (%) should be interpreted in the   context of the absolute cell counts (cells/L).     01/05/2023 11:07 AM 0.3 0.0 - 0.9 % Final     Comment:      Immature Granulocyte Count (IG) includes promyelocytes,    myelocytes and metamyelocytes but does not include bands.   Percent differential counts (%) should be interpreted in the   context of the absolute cell counts (cells/L).     10/20/2020 09:44 AM 0.2 0.0 - 0.9 % Final     Comment:      Immature Granulocyte Count (IG) includes promyelocytes,    myelocytes and metamyelocytes but does not include bands.   Percent differential counts (%) should be interpreted in the   context of the absolute cell counts (cells/L).       Lymphocytes %   Date/Time Value Ref Range Status   01/12/2025 03:04 AM 33.3 13.0 - 44.0 % Final   08/24/2023 12:10 PM 21.5 13.0 - 44.0 % Final   01/05/2023 11:07 AM 26.1 13.0 - 44.0 % Final   10/20/2020 09:44 AM 31.4 13.0 - 44.0 % Final     Monocytes %   Date/Time Value Ref Range Status   01/12/2025 03:04 AM 9.0 2.0 - 10.0 % Final   08/24/2023 12:10 PM 9.0 " 2.0 - 10.0 % Final   01/05/2023 11:07 AM 10.0 2.0 - 10.0 % Final   10/20/2020 09:44 AM 10.1 2.0 - 10.0 % Final     Eosinophils %   Date/Time Value Ref Range Status   01/12/2025 03:04 AM 5.8 0.0 - 6.0 % Final   08/24/2023 12:10 PM 0.9 0.0 - 6.0 % Final   01/05/2023 11:07 AM 3.3 0.0 - 6.0 % Final   10/20/2020 09:44 AM 6.8 0.0 - 6.0 % Final     Basophils %   Date/Time Value Ref Range Status   01/12/2025 03:04 AM 0.8 0.0 - 2.0 % Final   08/24/2023 12:10 PM 0.5 0.0 - 2.0 % Final   01/05/2023 11:07 AM 0.8 0.0 - 2.0 % Final   10/20/2020 09:44 AM 1.1 0.0 - 2.0 % Final     Neutrophils Absolute   Date/Time Value Ref Range Status   01/12/2025 03:04 AM 3.67 1.60 - 5.50 x10*3/uL Final     Comment:     Percent differential counts (%) should be interpreted in the context of the absolute cell counts (cells/uL).   08/24/2023 12:10 PM 6.18 1.20 - 7.70 x10E9/L Final   01/05/2023 11:07 AM 3.77 1.20 - 7.70 x10E9/L Final   10/20/2020 09:44 AM 2.39 1.20 - 7.70 x10E9/L Final     Immature Granulocytes Absolute, Automated   Date/Time Value Ref Range Status   01/12/2025 03:04 AM 0.03 0.00 - 0.50 x10*3/uL Final     Lymphocytes Absolute   Date/Time Value Ref Range Status   01/12/2025 03:04 AM 2.41 0.80 - 3.00 x10*3/uL Final   08/24/2023 12:10 PM 1.96 1.20 - 4.80 x10E9/L Final   01/05/2023 11:07 AM 1.65 1.20 - 4.80 x10E9/L Final   10/20/2020 09:44 AM 1.49 1.20 - 4.80 x10E9/L Final     Monocytes Absolute   Date/Time Value Ref Range Status   01/12/2025 03:04 AM 0.65 0.05 - 0.80 x10*3/uL Final   08/24/2023 12:10 PM 0.82 0.10 - 1.00 x10E9/L Final   01/05/2023 11:07 AM 0.63 0.10 - 1.00 x10E9/L Final   10/20/2020 09:44 AM 0.48 0.10 - 1.00 x10E9/L Final     Eosinophils Absolute   Date/Time Value Ref Range Status   01/12/2025 03:04 AM 0.42 (H) 0.00 - 0.40 x10*3/uL Final   08/24/2023 12:10 PM 0.08 0.00 - 0.70 x10E9/L Final   01/05/2023 11:07 AM 0.21 0.00 - 0.70 x10E9/L Final   10/20/2020 09:44 AM 0.32 0.00 - 0.70 x10E9/L Final     Basophils Absolute  "  Date/Time Value Ref Range Status   01/12/2025 03:04 AM 0.06 0.00 - 0.10 x10*3/uL Final   08/24/2023 12:10 PM 0.05 0.00 - 0.10 x10E9/L Final   01/05/2023 11:07 AM 0.05 0.00 - 0.10 x10E9/L Final   10/20/2020 09:44 AM 0.05 0.00 - 0.10 x10E9/L Final       No components found for: \"PT\"  aPTT   Date/Time Value Ref Range Status   01/05/2023 11:07 AM 28 26 - 39 sec Final     Comment:       THE APTT IS NO LONGER USED FOR MONITORING     UNFRACTIONATED HEPARIN THERAPY.    FOR MONITORING HEPARIN THERAPY,     USE THE HEPARIN ASSAY.     11/09/2020 10:29 AM 27 25 - 35 sec Final     Comment:       THE APTT IS NO LONGER USED FOR MONITORING     UNFRACTIONATED HEPARIN THERAPY.    FOR MONITORING HEPARIN THERAPY,     USE THE HEPARIN ASSAY.         Assessment/Plan         {Assess/PlanSmartLinks:78237}         Patrick Rodríguez MD                              [1]   Family History  Problem Relation Name Age of Onset    Lung cancer Father Jovany Garvin     Breast cancer Father Jovany Garvin     Other (cardiac disorder) Mother Fabiola Garvin     Heart disease Mother Fabiola Garvin     Breast cancer Sister      Other (cardiac disorder) Brother      Other (bladder cancer) Brother       "

## 2025-04-17 ENCOUNTER — PHARMACY VISIT (OUTPATIENT)
Dept: PHARMACY | Facility: CLINIC | Age: 70
End: 2025-04-17
Payer: COMMERCIAL

## 2025-04-17 DIAGNOSIS — M17.11 PRIMARY OSTEOARTHRITIS OF RIGHT KNEE: Primary | ICD-10-CM

## 2025-04-17 RX ORDER — CELECOXIB 200 MG/1
200 CAPSULE ORAL DAILY
Qty: 30 CAPSULE | Refills: 0 | Status: SHIPPED | OUTPATIENT
Start: 2025-04-17 | End: 2025-05-17

## 2025-04-17 ASSESSMENT — ENCOUNTER SYMPTOMS
PSYCHIATRIC NEGATIVE: 1
EYES NEGATIVE: 1
RESPIRATORY NEGATIVE: 1
CARDIOVASCULAR NEGATIVE: 1
HEMATOLOGIC/LYMPHATIC NEGATIVE: 1
ENDOCRINE NEGATIVE: 1
CONSTITUTIONAL NEGATIVE: 1
GASTROINTESTINAL NEGATIVE: 1
NEUROLOGICAL NEGATIVE: 1
MUSCULOSKELETAL NEGATIVE: 1

## 2025-04-17 NOTE — PROGRESS NOTES
Patient ID: John Garvin is a 70 y.o. male.  Referring Physician: No referring provider defined for this encounter.  Primary Care Provider: Alexander Garza DO  Visit Type:  Follow Up     Virtual or Telephone Consent    An interactive audio and video telecommunication system which permits real time communications between the patient (at the originating site) and provider (at the distant site) was utilized to provide this telehealth service.   Verbal consent was requested and obtained from John Garvin on this date, 04/17/25 for a telehealth visit and the patient's location was confirmed at the time of the visit.     Subjective    HPI How were my imaging studies?    Review of Systems   Constitutional: Negative.    HENT:  Negative.     Eyes: Negative.    Respiratory: Negative.     Cardiovascular: Negative.    Gastrointestinal: Negative.    Endocrine: Negative.    Genitourinary: Negative.     Musculoskeletal: Negative.    Skin: Negative.    Neurological: Negative.    Hematological: Negative.    Psychiatric/Behavioral: Negative.          Objective   BSA: There is no height or weight on file to calculate BSA.  There were no vitals taken for this visit.     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   has a past surgical history that includes Other surgical history (03/14/2019); Other surgical history (03/14/2019); Other surgical history (12/14/2022); Other surgical history (06/30/2022); Other surgical history (07/16/2021); Other surgical history (05/20/2019); Other surgical history (05/20/2019); and Kidney stone surgery (1985 / 2021).  Family History[1]  Oncology History    No history exists.       John Garvin  reports that he has never smoked. He has been exposed to tobacco smoke. He has never used smokeless tobacco.  He  reports no history of alcohol use.  He  reports no history of drug use.    Physical Exam    WBC   Date/Time Value Ref Range Status   01/12/2025 03:04 AM 7.2 4.4 - 11.3 x10*3/uL Final   08/24/2023 12:10 PM 9.1 4.4 - 11.3 x10E9/L Final   01/05/2023 11:07 AM 6.3 4.4 - 11.3 x10E9/L Final   11/21/2020 05:55 AM 10.7 4.4 - 11.3 x10E9/L Final     nRBC   Date Value Ref Range Status   01/12/2025 0.0 0.0 - 0.0 /100 WBCs Final   08/24/2023 0.0 0.0 - 0.0 /100 WBC Final   11/21/2020 0.0 0.0 - 0.0 /100 WBC Final   11/09/2020 0.0 0.0 - 0.0 /100 WBC Final     RBC   Date Value Ref Range Status   01/12/2025 4.25 (L) 4.50 - 5.90 x10*6/uL Final   08/24/2023 5.02 4.50 - 5.90 x10E12/L Final   01/05/2023 4.89 4.50 - 5.90 x10E12/L Final   11/21/2020 4.23 (L) 4.50 - 5.90 x10E12/L Final     Hemoglobin   Date Value Ref Range Status   01/12/2025 12.6 (L) 13.5 - 17.5 g/dL Final   08/24/2023 14.8 13.5 - 17.5 g/dL Final   01/05/2023 14.6 13.5 - 17.5 g/dL Final   11/21/2020 12.5 (L) 13.5 - 17.5 g/dL Final     Hematocrit   Date Value Ref Range Status   01/12/2025 39.0 (L) 41.0 - 52.0 % Final   08/24/2023 45.6 41.0 - 52.0 % Final   01/05/2023 44.1 41.0 - 52.0 % Final   11/21/2020 39.5 (L) 41.0 - 52.0 % Final     MCV   Date/Time Value Ref Range Status   01/12/2025 03:04 AM 92 80 - 100 fL Final   08/24/2023 12:10 PM 91 80 - 100 fL Final   01/05/2023 11:07 AM 90  "80 - 100 fL Final   11/21/2020 05:55 AM 93 80 - 100 fL Final     MCH   Date/Time Value Ref Range Status   01/12/2025 03:04 AM 29.6 26.0 - 34.0 pg Final     MCHC   Date/Time Value Ref Range Status   01/12/2025 03:04 AM 32.3 32.0 - 36.0 g/dL Final   08/24/2023 12:10 PM 32.5 32.0 - 36.0 g/dL Final   01/05/2023 11:07 AM 33.1 32.0 - 36.0 g/dL Final   11/21/2020 05:55 AM 31.6 (L) 32.0 - 36.0 g/dL Final     RDW   Date/Time Value Ref Range Status   01/12/2025 03:04 AM 12.5 11.5 - 14.5 % Final   08/24/2023 12:10 PM 12.5 11.5 - 14.5 % Final   01/05/2023 11:07 AM 12.3 11.5 - 14.5 % Final   11/21/2020 05:55 AM 12.2 11.5 - 14.5 % Final     Platelets   Date/Time Value Ref Range Status   01/12/2025 03:04  150 - 450 x10*3/uL Final   08/24/2023 12:10  150 - 450 x10E9/L Final   01/05/2023 11:07  150 - 450 x10E9/L Final   11/21/2020 05:55  150 - 450 x10E9/L Final     No results found for: \"MPV\"  Neutrophils %   Date/Time Value Ref Range Status   01/12/2025 03:04 AM 50.7 40.0 - 80.0 % Final   08/24/2023 12:10 PM 67.7 40.0 - 80.0 % Final   01/05/2023 11:07 AM 59.5 40.0 - 80.0 % Final   10/20/2020 09:44 AM 50.4 40.0 - 80.0 % Final     Immature Granulocytes %, Automated   Date/Time Value Ref Range Status   01/12/2025 03:04 AM 0.4 0.0 - 0.9 % Final     Comment:     Immature Granulocyte Count (IG) includes promyelocytes, myelocytes and metamyelocytes but does not include bands. Percent differential counts (%) should be interpreted in the context of the absolute cell counts (cells/UL).   08/24/2023 12:10 PM 0.4 0.0 - 0.9 % Final     Comment:      Immature Granulocyte Count (IG) includes promyelocytes,    myelocytes and metamyelocytes but does not include bands.   Percent differential counts (%) should be interpreted in the   context of the absolute cell counts (cells/L).     01/05/2023 11:07 AM 0.3 0.0 - 0.9 % Final     Comment:      Immature Granulocyte Count (IG) includes promyelocytes,    myelocytes and " metamyelocytes but does not include bands.   Percent differential counts (%) should be interpreted in the   context of the absolute cell counts (cells/L).     10/20/2020 09:44 AM 0.2 0.0 - 0.9 % Final     Comment:      Immature Granulocyte Count (IG) includes promyelocytes,    myelocytes and metamyelocytes but does not include bands.   Percent differential counts (%) should be interpreted in the   context of the absolute cell counts (cells/L).       Lymphocytes %   Date/Time Value Ref Range Status   01/12/2025 03:04 AM 33.3 13.0 - 44.0 % Final   08/24/2023 12:10 PM 21.5 13.0 - 44.0 % Final   01/05/2023 11:07 AM 26.1 13.0 - 44.0 % Final   10/20/2020 09:44 AM 31.4 13.0 - 44.0 % Final     Monocytes %   Date/Time Value Ref Range Status   01/12/2025 03:04 AM 9.0 2.0 - 10.0 % Final   08/24/2023 12:10 PM 9.0 2.0 - 10.0 % Final   01/05/2023 11:07 AM 10.0 2.0 - 10.0 % Final   10/20/2020 09:44 AM 10.1 2.0 - 10.0 % Final     Eosinophils %   Date/Time Value Ref Range Status   01/12/2025 03:04 AM 5.8 0.0 - 6.0 % Final   08/24/2023 12:10 PM 0.9 0.0 - 6.0 % Final   01/05/2023 11:07 AM 3.3 0.0 - 6.0 % Final   10/20/2020 09:44 AM 6.8 0.0 - 6.0 % Final     Basophils %   Date/Time Value Ref Range Status   01/12/2025 03:04 AM 0.8 0.0 - 2.0 % Final   08/24/2023 12:10 PM 0.5 0.0 - 2.0 % Final   01/05/2023 11:07 AM 0.8 0.0 - 2.0 % Final   10/20/2020 09:44 AM 1.1 0.0 - 2.0 % Final     Neutrophils Absolute   Date/Time Value Ref Range Status   01/12/2025 03:04 AM 3.67 1.60 - 5.50 x10*3/uL Final     Comment:     Percent differential counts (%) should be interpreted in the context of the absolute cell counts (cells/uL).   08/24/2023 12:10 PM 6.18 1.20 - 7.70 x10E9/L Final   01/05/2023 11:07 AM 3.77 1.20 - 7.70 x10E9/L Final   10/20/2020 09:44 AM 2.39 1.20 - 7.70 x10E9/L Final     Immature Granulocytes Absolute, Automated   Date/Time Value Ref Range Status   01/12/2025 03:04 AM 0.03 0.00 - 0.50 x10*3/uL Final     Lymphocytes Absolute  "  Date/Time Value Ref Range Status   01/12/2025 03:04 AM 2.41 0.80 - 3.00 x10*3/uL Final   08/24/2023 12:10 PM 1.96 1.20 - 4.80 x10E9/L Final   01/05/2023 11:07 AM 1.65 1.20 - 4.80 x10E9/L Final   10/20/2020 09:44 AM 1.49 1.20 - 4.80 x10E9/L Final     Monocytes Absolute   Date/Time Value Ref Range Status   01/12/2025 03:04 AM 0.65 0.05 - 0.80 x10*3/uL Final   08/24/2023 12:10 PM 0.82 0.10 - 1.00 x10E9/L Final   01/05/2023 11:07 AM 0.63 0.10 - 1.00 x10E9/L Final   10/20/2020 09:44 AM 0.48 0.10 - 1.00 x10E9/L Final     Eosinophils Absolute   Date/Time Value Ref Range Status   01/12/2025 03:04 AM 0.42 (H) 0.00 - 0.40 x10*3/uL Final   08/24/2023 12:10 PM 0.08 0.00 - 0.70 x10E9/L Final   01/05/2023 11:07 AM 0.21 0.00 - 0.70 x10E9/L Final   10/20/2020 09:44 AM 0.32 0.00 - 0.70 x10E9/L Final     Basophils Absolute   Date/Time Value Ref Range Status   01/12/2025 03:04 AM 0.06 0.00 - 0.10 x10*3/uL Final   08/24/2023 12:10 PM 0.05 0.00 - 0.10 x10E9/L Final   01/05/2023 11:07 AM 0.05 0.00 - 0.10 x10E9/L Final   10/20/2020 09:44 AM 0.05 0.00 - 0.10 x10E9/L Final       No components found for: \"PT\"  aPTT   Date/Time Value Ref Range Status   01/05/2023 11:07 AM 28 26 - 39 sec Final     Comment:       THE APTT IS NO LONGER USED FOR MONITORING     UNFRACTIONATED HEPARIN THERAPY.    FOR MONITORING HEPARIN THERAPY,     USE THE HEPARIN ASSAY.     11/09/2020 10:29 AM 27 25 - 35 sec Final     Comment:       THE APTT IS NO LONGER USED FOR MONITORING     UNFRACTIONATED HEPARIN THERAPY.    FOR MONITORING HEPARIN THERAPY,     USE THE HEPARIN ASSAY.         Assessment/Plan    1) pulmonary emboli  -on 11/25/2024 he underwent decompression laminectomy interbody fusion lumbar posterior level 1 by Dr Valverde at [facility]  -he was discharged home, but was not placed on any chemical thromboprophylaxis  -he was not very mobile for the first 2-3 weeks at home  -on 1/12/2025 he went to the ED for evaluation of right sided chest pain that woke him up from " sleep  -CT angio of chest showed acute pulmonary arterial filling defects in right middle lobe and right lower lobe segmental branches of the pulmonary arteries; there are also acute filling defects in the left lower lobe segmental pulmonary arteries  -he was started on UFH drip  -ER discussed with MICU fellow Dr Patel who advised that the patient can remain at [facility] and also recommended doppler of his legs  -doppler of legs was never performed  -he was discharged home on 1/12/2025 on xarelto, which comes in 15 and 20 mg tablets, but was instructed very bizarrely to take 6 x 2.5 mg tablets BID for 21 days then 8 x 2.5 mg tablets daily (when a starter pack comes with 15 mg and 20 mg tabs)  -on 1/23/2025 his PCP checked factor V Leiden (negative) and lupus anticoagulant (purportedly positive), however one should never screen for thrombophilia while actively anticoagulated (active anticoagulation, including xarelto, can induce false positive lupus anticoagulants)  -this was clearly a provoked VTE event, and he is nearing 3 month anniversary  -will check CT angio of chest and doppler of legs  -will need fresh creatinine to be drawn today  -he is also interested in being screened for inheritable thrombophilia but we do not do this while on active anticoagulation  -here for interval followup via virtual/telehealth modality  -last took xarelto this morning  -doppler of legs done on 4/11/2025 reviewed--in right leg there is no evidence of acute DVT; in left leg there is no evidence of acute DVT  -CT angio of chest done on 4/11/2025 reviewed--no discrete filing defects within main pulmonary artery and its branches to suggest acute pulmonary embolism  -this was definitely a provoked event  -John has elected to stop xarelto  -I have advised him to at the very least transition to daily baby ASA to serve as thromboprophylaxis  -he is currently not interested in being screened for inheritable thrombophilia  -he was advised to save  the rest of the xarelto to serve as additional thromboprophylaxis while traveling  -he has colonoscopy scheduled for next month with Dr Samayoa  -he may return PRN     2) hyperuricemia  -on allopurinol     3) nephrolithiasis  -uric acid stones  -on hydrochlorothiazide       Problem List Items Addressed This Visit    None           Patrick Rodríguez MD                              [1]   Family History  Problem Relation Name Age of Onset    Lung cancer Father Jovany Garvin     Breast cancer Father Jovany Garvin     Other (cardiac disorder) Mother Fabiola Garvin     Heart disease Mother Fabiola Garvin     Breast cancer Sister      Other (cardiac disorder) Brother      Other (bladder cancer) Brother

## 2025-04-21 ENCOUNTER — PATIENT MESSAGE (OUTPATIENT)
Dept: PRIMARY CARE | Facility: CLINIC | Age: 70
End: 2025-04-21
Payer: MEDICARE

## 2025-04-21 DIAGNOSIS — B37.2 INTERTRIGINOUS CANDIDIASIS: Primary | ICD-10-CM

## 2025-04-21 RX ORDER — KETOCONAZOLE 20 MG/G
CREAM TOPICAL 2 TIMES DAILY
Qty: 30 G | Refills: 0 | Status: SHIPPED | OUTPATIENT
Start: 2025-04-21 | End: 2025-05-06

## 2025-04-21 NOTE — PROGRESS NOTES
Assessment/Plan   Problem List Items Addressed This Visit    None      Subjective   Patient ID: John Garvin is a 70 y.o. male who presents for No chief complaint on file..    Surgical History[1]   Family History[2]   Social History     Socioeconomic History    Marital status:      Spouse name: Not on file    Number of children: Not on file    Years of education: Not on file    Highest education level: Not on file   Occupational History    Not on file   Tobacco Use    Smoking status: Never     Passive exposure: Past    Smokeless tobacco: Never   Substance and Sexual Activity    Alcohol use: Never    Drug use: Never    Sexual activity: Yes     Partners: Female     Birth control/protection: Post-menopausal, Female Sterilization   Other Topics Concern    Not on file   Social History Narrative    Not on file     Social Drivers of Health     Financial Resource Strain: Low Risk  (1/15/2025)    Overall Financial Resource Strain (CARDIA)     Difficulty of Paying Living Expenses: Not hard at all   Food Insecurity: No Food Insecurity (1/15/2025)    Hunger Vital Sign     Worried About Running Out of Food in the Last Year: Never true     Ran Out of Food in the Last Year: Never true   Transportation Needs: No Transportation Needs (1/15/2025)    PRAPARE - Transportation     Lack of Transportation (Medical): No     Lack of Transportation (Non-Medical): No   Physical Activity: Inactive (1/15/2025)    Exercise Vital Sign     Days of Exercise per Week: 0 days     Minutes of Exercise per Session: 0 min   Stress: No Stress Concern Present (1/15/2025)    [place] [place] [employer] - Occupational Stress Questionnaire     Feeling of Stress : Not at all   Social Connections: Socially Integrated (1/15/2025)    Social Connection and Isolation Panel [NHANES]     Frequency of Communication with Friends and Family: More than three times a week     Frequency of Social Gatherings with Friends and Family: More than three times  "a week     Attends Confucianism Services: More than 4 times per year     Active Member of Clubs or Organizations: Yes     Attends Club or Organization Meetings: More than 4 times per year     Marital Status:    Intimate Partner Violence: Not At Risk (1/15/2025)    Humiliation, Afraid, Rape, and Kick questionnaire     Fear of Current or Ex-Partner: No     Emotionally Abused: No     Physically Abused: No     Sexually Abused: No   Housing Stability: Low Risk  (1/15/2025)    Housing Stability Vital Sign     Unable to Pay for Housing in the Last Year: No     Number of Times Moved in the Last Year: 0     Homeless in the Last Year: No      Adhesive, Mold, Morphine, and Yeast   Current Rx[3]   There were no vitals filed for this visit.   Problem List Items Addressed This Visit    None     No orders of the defined types were placed in this encounter.       HPI    ROS    PHYSICAL EXAM      No results found for: \"PR1\", \"BMPR1A\", \"CMPLAS\", \"DY5YHMGJ\", \"KPSAT\"   Lab Results   Component Value Date    CHOL 178 10/20/2020    CHHDL 4.1 10/20/2020     Lab Results   Component Value Date    TSH 1.06 11/27/2019    HGBA1C 6.0 10/20/2020    PSA 2.05 08/15/2024              === 04/11/25 ===    CT ANGIO CHEST FOR PULMONARY EMBOLISM    - Impression -  1. No evidence of acute pulmonary embolism.  2. Large calcified granulomas in the lung bases.  3. Cardiomegaly with coronary artery calcification.    MACRO:  None    Signed by: Rene Wong 4/11/2025 5:42 PM  Dictation workstation:   [id number]                          [1]   Past Surgical History:  Procedure Laterality Date    KIDNEY STONE SURGERY  1985 / 2021    OTHER SURGICAL HISTORY  03/14/2019    Lithotripsy    OTHER SURGICAL HISTORY  03/14/2019    Nasal septoplasty    OTHER SURGICAL HISTORY  12/14/2022    Ganglion cyst excision    OTHER SURGICAL HISTORY  06/30/2022    Percutaneous nephrolithotomy    OTHER SURGICAL HISTORY  07/16/2021    Kidney surgery    OTHER SURGICAL HISTORY  " 05/20/2019    Partial atrial septal defect repair    OTHER SURGICAL HISTORY  05/20/2019    Lithotripsy   [2]   Family History  Problem Relation Name Age of Onset    Lung cancer Father Jovany Garvin     Breast cancer Father Jovany Garvin     Other (cardiac disorder) Mother Fabiola Garvin     Heart disease Mother Fabiola Garvin     Breast cancer Sister      Other (cardiac disorder) Brother      Other (bladder cancer) Brother     [3]   Current Outpatient Medications   Medication Sig Dispense Refill    acetaminophen (TYLENOL ORAL) Take 650 mg by mouth 3 times a day. CAPS      allopurinol (Zyloprim) 300 mg tablet Take 0.5 tablets (150 mg) by mouth once daily. 45 tablet 3    celecoxib (CeleBREX) 200 mg capsule Take 1 capsule (200 mg) by mouth once daily. 30 capsule 0    cetirizine (ZyrTEC) 10 mg tablet Take 1 tablet (10 mg) by mouth once daily.      cholecalciferol (Vitamin D-3) 50 mcg (2,000 unit) capsule Take 1 capsule (50 mcg) by mouth once daily.      fish oil concentrate (Omega-3) 120-180 mg capsule Take 1 tablet by mouth 1 (one) time each day. (Patient not taking: Reported on 4/11/2025)      fluticasone (Flonase) 50 mcg/actuation nasal spray Administer 1 spray into each nostril once daily.      hydroCHLOROthiazide (Microzide) 12.5 mg tablet Take 1 tablet (12.5 mg) by mouth once daily. 90 tablet 3    magnesium glycinate 100 mg tablet Take 500 mg by mouth once daily at bedtime.      melatonin 3 mg tablet Take 1 tablet (3 mg) by mouth once daily at bedtime.      multivitamin tablet Take 1 tablet by mouth once daily.      potassium citrate CR (Urocit-K-15) 15 mEq ER tablet Take 1 tablet (15 mEq) by mouth once daily.      rivaroxaban (Xarelto) 20 mg tablet Take 1 tablet (20 mg) by mouth once daily. Take with food. 90 tablet 3    tiZANidine (Zanaflex) 4 mg tablet Take 1 tablet (4 mg) by mouth 2 times a day as needed for muscle spasms. 180 tablet 3    triamcinolone (Kenalog) 0.1 % ointment Apply topically 2 times a day as  needed for irritation or rash. 15 g 0     No current facility-administered medications for this visit.

## 2025-04-22 ENCOUNTER — CLINICAL SUPPORT (OUTPATIENT)
Dept: PREADMISSION TESTING | Facility: HOSPITAL | Age: 70
End: 2025-04-22
Payer: MEDICARE

## 2025-04-22 VITALS — BODY MASS INDEX: 30.86 KG/M2 | HEIGHT: 71 IN | WEIGHT: 220.46 LBS

## 2025-04-22 RX ORDER — IBUPROFEN 200 MG
200 TABLET ORAL EVERY 6 HOURS PRN
COMMUNITY

## 2025-04-22 NOTE — PREPROCEDURE INSTRUCTIONS

## 2025-05-07 ENCOUNTER — ANESTHESIA (OUTPATIENT)
Dept: GASTROENTEROLOGY | Facility: HOSPITAL | Age: 70
End: 2025-05-07
Payer: MEDICARE

## 2025-05-07 ENCOUNTER — ANESTHESIA EVENT (OUTPATIENT)
Dept: GASTROENTEROLOGY | Facility: HOSPITAL | Age: 70
End: 2025-05-07
Payer: MEDICARE

## 2025-05-07 ENCOUNTER — HOSPITAL ENCOUNTER (OUTPATIENT)
Dept: GASTROENTEROLOGY | Facility: HOSPITAL | Age: 70
Discharge: HOME | End: 2025-05-07
Payer: MEDICARE

## 2025-05-07 VITALS
HEART RATE: 85 BPM | TEMPERATURE: 97.5 F | RESPIRATION RATE: 18 BRPM | BODY MASS INDEX: 29.23 KG/M2 | OXYGEN SATURATION: 95 % | SYSTOLIC BLOOD PRESSURE: 133 MMHG | HEIGHT: 71 IN | DIASTOLIC BLOOD PRESSURE: 83 MMHG | WEIGHT: 208.78 LBS

## 2025-05-07 DIAGNOSIS — K62.5 RECTAL BLEEDING: Primary | ICD-10-CM

## 2025-05-07 PROCEDURE — 2500000004 HC RX 250 GENERAL PHARMACY W/ HCPCS (ALT 636 FOR OP/ED): Mod: JZ | Performed by: ANESTHESIOLOGY

## 2025-05-07 PROCEDURE — 3700000001 HC GENERAL ANESTHESIA TIME - INITIAL BASE CHARGE

## 2025-05-07 PROCEDURE — G0121 COLON CA SCRN NOT HI RSK IND: HCPCS | Performed by: SURGERY

## 2025-05-07 PROCEDURE — 7100000009 HC PHASE TWO TIME - INITIAL BASE CHARGE

## 2025-05-07 PROCEDURE — 3700000002 HC GENERAL ANESTHESIA TIME - EACH INCREMENTAL 1 MINUTE

## 2025-05-07 PROCEDURE — 7100000010 HC PHASE TWO TIME - EACH INCREMENTAL 1 MINUTE

## 2025-05-07 PROCEDURE — 2500000004 HC RX 250 GENERAL PHARMACY W/ HCPCS (ALT 636 FOR OP/ED)

## 2025-05-07 PROCEDURE — 45378 DIAGNOSTIC COLONOSCOPY: CPT | Performed by: SURGERY

## 2025-05-07 RX ORDER — BUTYROSPERMUM PARKII(SHEA BUTTER), SIMMONDSIA CHINENSIS (JOJOBA) SEED OIL, ALOE BARBADENSIS LEAF EXTRACT .01; 1; 3.5 G/100G; G/100G; G/100G
250 LIQUID TOPICAL EVERY OTHER DAY
COMMUNITY

## 2025-05-07 RX ORDER — PROPOFOL 10 MG/ML
INJECTION, EMULSION INTRAVENOUS AS NEEDED
Status: DISCONTINUED | OUTPATIENT
Start: 2025-05-07 | End: 2025-05-07

## 2025-05-07 RX ORDER — SODIUM CHLORIDE, SODIUM LACTATE, POTASSIUM CHLORIDE, CALCIUM CHLORIDE 600; 310; 30; 20 MG/100ML; MG/100ML; MG/100ML; MG/100ML
50 INJECTION, SOLUTION INTRAVENOUS CONTINUOUS
Status: DISCONTINUED | OUTPATIENT
Start: 2025-05-07 | End: 2025-05-08 | Stop reason: HOSPADM

## 2025-05-07 RX ORDER — LIDOCAINE HYDROCHLORIDE 20 MG/ML
INJECTION, SOLUTION INFILTRATION; PERINEURAL AS NEEDED
Status: DISCONTINUED | OUTPATIENT
Start: 2025-05-07 | End: 2025-05-07

## 2025-05-07 RX ADMIN — PROPOFOL 100 MCG/KG/MIN: 10 INJECTION, EMULSION INTRAVENOUS at 13:36

## 2025-05-07 RX ADMIN — SODIUM CHLORIDE, SODIUM LACTATE, POTASSIUM CHLORIDE, AND CALCIUM CHLORIDE 50 ML/HR: .6; .31; .03; .02 INJECTION, SOLUTION INTRAVENOUS at 12:08

## 2025-05-07 RX ADMIN — LIDOCAINE HYDROCHLORIDE 40 ML: 20 INJECTION, SOLUTION INFILTRATION; PERINEURAL at 13:36

## 2025-05-07 RX ADMIN — PROPOFOL 80 MG: 10 INJECTION, EMULSION INTRAVENOUS at 13:36

## 2025-05-07 RX ADMIN — PROPOFOL 50 MG: 10 INJECTION, EMULSION INTRAVENOUS at 13:38

## 2025-05-07 ASSESSMENT — PAIN SCALES - GENERAL
PAINLEVEL_OUTOF10: 0 - NO PAIN

## 2025-05-07 ASSESSMENT — PAIN - FUNCTIONAL ASSESSMENT
PAIN_FUNCTIONAL_ASSESSMENT: 0-10

## 2025-05-07 ASSESSMENT — COLUMBIA-SUICIDE SEVERITY RATING SCALE - C-SSRS
2. HAVE YOU ACTUALLY HAD ANY THOUGHTS OF KILLING YOURSELF?: NO
6. HAVE YOU EVER DONE ANYTHING, STARTED TO DO ANYTHING, OR PREPARED TO DO ANYTHING TO END YOUR LIFE?: NO
1. IN THE PAST MONTH, HAVE YOU WISHED YOU WERE DEAD OR WISHED YOU COULD GO TO SLEEP AND NOT WAKE UP?: NO

## 2025-05-07 NOTE — Clinical Note
Huddle and Timeout completed together with team. Patient wristband and JACEK information verified.  Anesthesia safety check completed. Patient was a and o x3

## 2025-05-07 NOTE — ANESTHESIA POSTPROCEDURE EVALUATION
Patient: John Garvin    Procedure Summary       Date: 05/07/25 Room / Location: [facility]    Anesthesia Start: 1330 Anesthesia Stop:     Procedure: COLONOSCOPY Diagnosis:       Rectal bleeding      Rectal bleeding    Scheduled Providers: Brennon Samayoa MD; Demond De Jesus MD; Inna Anderson RN; Betina Live Responsible Provider: Demond De Jesus MD    Anesthesia Type: MAC ASA Status: Not recorded            Anesthesia Type: MAC    Vitals Value Taken Time   /68 05/07/25 14:05   Temp - 05/07/25 14:05   Pulse 72 05/07/25 14:05   Resp 16 05/07/25 14:05   SpO2 98 05/07/25 14:05       Anesthesia Post Evaluation    Patient location during evaluation: PACU  Patient participation: complete - patient participated  Level of consciousness: awake  Pain management: adequate  Multimodal analgesia pain management approach  Airway patency: patent  Cardiovascular status: acceptable  Respiratory status: acceptable  Hydration status: acceptable  Postoperative Nausea and Vomiting: none        No notable events documented.

## 2025-05-07 NOTE — Clinical Note
Patient tolerated procedure well. Appears comfortable with no complaints of pain. VS stable. Arousable prior to transport. Patient transported to [facility] via cart.  Report called              . Handoff completed

## 2025-05-07 NOTE — DISCHARGE INSTRUCTIONS
Patient Instructions after an endoscopy or colonoscopy      The anesthetics, sedatives or narcotics which were given to you today will be acting in your body for the next 24 hours, so you might feel a little sleepy or groggy.  This feeling should slowly wear off. Carefully read and follow the instructions.     You received sedation today:  - Do not drive or operate any machinery or power tools of any kind.   - No alcoholic beverages today, not even beer or wine.  - No over the counter medications that contain alcohol or that may cause drowsiness.  - Do not make any important decisions or sign any legal documents.    While it is common to experience mild to moderate abdominal distention, gas, or belching after your procedure, if any of these symptoms occur following discharge from the GI Lab or within one week of having your procedure, call the [facility] to be advised whether a visit to your nearest Urgent Care or Emergency Department is indicated.  Take this paper with you if you go.     - If you develop an allergic reaction to the medications that were given during your procedure such as difficulty breathing, rash, hives, severe nausea, vomiting or lightheadedness.  - If you experience chest pain, shortness of breath, severe abdominal pain, fevers and chills.    -If you develop signs and symptoms of bleeding such as blood in your spit, if your stools turn black, tarry, or bloody    - If you have not urinated within 8 hours following your procedure.- If your IV site becomes painful, red, inflamed, or looks infected.    High Fiber Diet    About this topic  Dietary fiber helps many illnesses. It can help you if you cannot have a bowel movement or if you have loose stools. Fiber can also lower your risk of diabetes and heart disease. Fiber can help with weight loss by helping you feel wing after meals.  You can find fiber in fruits, vegetables, nuts and seeds, whole grains, and legumes. The fiber is  the part of the plant food that your body cannot break down and absorb. It passes through your stomach, small bowel, colon, and out your body.  There are two kinds of fiber: Insoluble and soluble fiber. Insoluble fiber helps you pass foods through your digestive system. Insoluble fiber can help you with hard stools. Soluble fiber draws water in and turns it into a gel-like form making digestion slow down. Both are important.    What will the results be?  A high fiber diet can help you with bowel problems like stools that are too hard or too loose. It can also help prevent hemorrhoids and other colon problems. A high fiber diet can also help control your weight and lower blood sugar and cholesterol levels.  What changes to diet are needed?  The amount of fiber you need is based on your age, gender, and health.  Try to get 20 to 35 grams of fiber in your diet each day. Most people in the [place] only eat 15 grams of fiber daily.  Drink at least 8 cups (1920 mL) of fluid each day.  When is this diet used?  Your doctor may talk with you about this diet if you have belly problems.  Who should use this diet?  Older children, young people, and adults can have this diet.  Who should not use this diet?  Some people should not use this diet. Check with your doctor if you have:  Diverticulitis  Active Crohn's disease  Ulcerative colitis  Bowel inflammation  Certain types of GI surgery  Talk to your child's doctor before starting your child on a high fiber diet.  What foods are good to eat?  To get the most from fiber in your diet, eat a wide variety of high fiber foods. Some examples are:  Vegetables like:  Spinach  Peas  Artichoke  Sweet potatoes with skin  Broccoli  Fruits like:  Raspberries  Blueberries  Blackberries  Apples with skin  Dried fruits  Grains like:  Oat bran  Barley  Whole wheat products  Wheat bran  Dried beans and nuts like:  Sunflower seeds  Almonds  Black beans  Chickpeas  What problems could happen?  Sudden  increase of fiber intake can lead to gas, pain, fullness in your belly, and loose stools. Increase fiber gradually while drinking plenty of fluids.  Do not eat too much fiber. Your body will not take in vitamins and minerals as well if you eat too much fiber.  When do I need to call the doctor?  Health problem is not better or you are feeling worse.  Helpful tips  Start slow as you add more fiber to your diet. This may help prevent gas or cramps.  Try to eat the same amount of fiber each day. Aim to get your fiber from nutritious foods. Supplements do not offer the same benefits as food.  Read food labels with care to learn how much fiber is in the food you are eating.  If possible, do not peel fruits or vegetables before you eat them. Eating the peel gives you more fiber.  Where can I learn more?  Eat Right  https://www.eatright.org/food/vitamins-and-supplements/types-of-vitamins-and-nutrients/easy-ways-to-boost-fiber-in-your-daily-diet  Last Reviewed Date  2021-09-23    Physician Phone List Provided

## 2025-05-09 SDOH — HEALTH STABILITY: MENTAL HEALTH: CURRENT SMOKER: 0

## 2025-05-09 NOTE — ANESTHESIA PREPROCEDURE EVALUATION
Patient: John Garvin    Procedure Information       Anesthesia Start Date/Time: 05/07/25 1330    Scheduled providers: Brennon Samayoa MD; Demond De Jesus MD; Inna Anderson RN; Betina Live    Procedure: COLONOSCOPY    Location: [facility]            Relevant Problems   Cardiac   (+) Dyslipidemia, goal LDL below 130   (+) Primary hypertension      Pulmonary   (+) Acute pulmonary embolism with acute cor pulmonale (Multi)   (+) Other pulmonary embolism with acute cor pulmonale   (+) PE (pulmonary thromboembolism) (Multi)      Neuro   (+) Lumbar radiculopathy, chronic      GI   (+) Gastroesophageal reflux disease with esophagitis   (+) Rectal bleeding      /Renal   (+) Left renal stone   (+) Recurrent nephrolithiasis   (+) Uric acid kidney stone      Hematology   (+) PE (pulmonary thromboembolism) (Multi)   (+) Postoperative deep vein thrombosis (Multi)      HEENT   (+) Chronic pansinusitis      Skin   (+) Pruritic rash       Clinical information reviewed:   Tobacco  Allergies  Meds   Med Hx  Surg Hx   Fam Hx  Soc Hx        NPO Detail:  No data recorded     Physical Exam    Airway  Mallampati: II  TM distance: >3 FB  Neck ROM: full  Mouth opening: 3 or more finger widths     Cardiovascular    Dental - normal exam     Pulmonary    Abdominal            Anesthesia Plan    History of general anesthesia?: yes  History of complications of general anesthesia?: no    ASA 3     MAC     The patient is not a current smoker.    intravenous induction   Anesthetic plan and risks discussed with patient.    Plan discussed with CRNA.

## 2025-05-12 ENCOUNTER — APPOINTMENT (OUTPATIENT)
Dept: SURGERY | Facility: CLINIC | Age: 70
End: 2025-05-12
Payer: MEDICARE

## 2025-05-12 VITALS — WEIGHT: 208 LBS | BODY MASS INDEX: 29.12 KG/M2 | HEIGHT: 71 IN

## 2025-05-12 DIAGNOSIS — K60.2 PERIANAL FISSURE: ICD-10-CM

## 2025-05-12 DIAGNOSIS — K57.30 DIVERTICULOSIS, SIGMOID: Primary | ICD-10-CM

## 2025-05-12 PROCEDURE — 1159F MED LIST DOCD IN RCRD: CPT | Performed by: SURGERY

## 2025-05-12 PROCEDURE — 1160F RVW MEDS BY RX/DR IN RCRD: CPT | Performed by: SURGERY

## 2025-05-12 PROCEDURE — 99213 OFFICE O/P EST LOW 20 MIN: CPT | Performed by: SURGERY

## 2025-05-12 PROCEDURE — 3008F BODY MASS INDEX DOCD: CPT | Performed by: SURGERY

## 2025-05-12 NOTE — PATIENT INSTRUCTIONS
The hemorrhoids and diverticulosis, continue on the psyllium husk (2 table spoon in 12 oz of water daily), continue the fruits, vegetables, fluid and for the fissure, apply the ointment twice daily; followuup in 2 months

## 2025-05-12 NOTE — PROGRESS NOTES
Subjective   Patient ID: John Garvin is a 70 y.o. male who presents for Post-op.  HPI  70-year-old male patient who underwent colonoscopy May 7.  He was noted to have grade 3 internal hemorrhoids, sigmoid colon diverticulosis and anal fissure. Continues to have sharp perianal pain after bowel movement. Intermittent bright red blood in stool.    Objective   Physical Exam  Gen: no acute distress  CV: RRR  Pulm: CTAB  Abd: soft, non-distended, non-tender  Assessment/Plan   I discussed with patient the endoscopic findings.  We talked about management of the hemorrhoids and diverticulosis with high-fiber diet, psyllium husk and increase fluid intake.  For the management of fissure, he is giving prescription for 0.4% nifedipine/2% lidocaine/ hydrocortisone 1.5% twice daily.  Follow-up in 2 months         Brennon Samayoa MD 05/12/25 11:03 AM

## 2025-05-19 ENCOUNTER — APPOINTMENT (OUTPATIENT)
Dept: PRIMARY CARE | Facility: CLINIC | Age: 70
End: 2025-05-19
Payer: MEDICARE

## 2025-05-19 DIAGNOSIS — K62.5 RECTAL BLEEDING: ICD-10-CM

## 2025-05-19 DIAGNOSIS — G89.29 CHRONIC MIDLINE BACK PAIN, UNSPECIFIED BACK LOCATION: ICD-10-CM

## 2025-05-19 DIAGNOSIS — N52.1 ERECTILE DYSFUNCTION DUE TO DISEASES CLASSIFIED ELSEWHERE: ICD-10-CM

## 2025-05-19 DIAGNOSIS — I26.09 OTHER PULMONARY EMBOLISM WITH ACUTE COR PULMONALE, UNSPECIFIED CHRONICITY (MULTI): ICD-10-CM

## 2025-05-19 DIAGNOSIS — K60.2 PERIANAL FISSURE: ICD-10-CM

## 2025-05-19 DIAGNOSIS — Z00.00 MEDICARE ANNUAL WELLNESS VISIT, SUBSEQUENT: Primary | ICD-10-CM

## 2025-05-19 DIAGNOSIS — M54.9 CHRONIC MIDLINE BACK PAIN, UNSPECIFIED BACK LOCATION: ICD-10-CM

## 2025-05-19 DIAGNOSIS — Z00.00 ROUTINE GENERAL MEDICAL EXAMINATION AT HEALTH CARE FACILITY: ICD-10-CM

## 2025-05-19 PROCEDURE — 1160F RVW MEDS BY RX/DR IN RCRD: CPT | Performed by: FAMILY MEDICINE

## 2025-05-19 PROCEDURE — 3079F DIAST BP 80-89 MM HG: CPT | Performed by: FAMILY MEDICINE

## 2025-05-19 PROCEDURE — 1159F MED LIST DOCD IN RCRD: CPT | Performed by: FAMILY MEDICINE

## 2025-05-19 PROCEDURE — G0439 PPPS, SUBSEQ VISIT: HCPCS | Performed by: FAMILY MEDICINE

## 2025-05-19 PROCEDURE — 3008F BODY MASS INDEX DOCD: CPT | Performed by: FAMILY MEDICINE

## 2025-05-19 PROCEDURE — 1036F TOBACCO NON-USER: CPT | Performed by: FAMILY MEDICINE

## 2025-05-19 PROCEDURE — 1170F FXNL STATUS ASSESSED: CPT | Performed by: FAMILY MEDICINE

## 2025-05-19 PROCEDURE — 3074F SYST BP LT 130 MM HG: CPT | Performed by: FAMILY MEDICINE

## 2025-05-19 PROCEDURE — 99212 OFFICE O/P EST SF 10 MIN: CPT | Performed by: FAMILY MEDICINE

## 2025-05-19 RX ORDER — TADALAFIL 20 MG/1
20 TABLET ORAL
Qty: 20 TABLET | Refills: 0 | Status: SHIPPED | OUTPATIENT
Start: 2025-05-19

## 2025-05-19 ASSESSMENT — ENCOUNTER SYMPTOMS
DEPRESSION: 0
LOSS OF SENSATION IN FEET: 0
OCCASIONAL FEELINGS OF UNSTEADINESS: 0

## 2025-05-19 ASSESSMENT — ACTIVITIES OF DAILY LIVING (ADL)
DOING_HOUSEWORK: INDEPENDENT
DRESSING: INDEPENDENT
MANAGING_FINANCES: INDEPENDENT
TAKING_MEDICATION: INDEPENDENT
BATHING: INDEPENDENT
GROCERY_SHOPPING: INDEPENDENT

## 2025-05-19 ASSESSMENT — PATIENT HEALTH QUESTIONNAIRE - PHQ9
1. LITTLE INTEREST OR PLEASURE IN DOING THINGS: NOT AT ALL
SUM OF ALL RESPONSES TO PHQ9 QUESTIONS 1 AND 2: 0
2. FEELING DOWN, DEPRESSED OR HOPELESS: NOT AT ALL

## 2025-05-19 NOTE — PROGRESS NOTES
Subjective   Reason for Visit: John Garvin is an 70 y.o. male here for a Medicare Wellness visit.     Past Medical, Surgical, and Family History reviewed and updated in chart.    Reviewed all medications by prescribing practitioner or clinical pharmacist (such as prescriptions, OTCs, herbal therapies and supplements) and documented in the medical record.    HPI  Pleasant 70-year-old gentleman here for Medicare wellness.  That was sent to oncology and they were able to take him off since he had his January provoked pulmonary embolus most likely secondary to bedridden condition from his December surgery on his backs done very well  He had also say on seeing him for colonoscopy had bleeding from a internal hemorrhoids but otherwise no neoplasm seen as good and he remains off Xarelto.  He remains on hydrochlorothiazide per direction of urology for uric acid kidney stones no further flank pain hematuria is done very well.  Is aware to be increasing liquids.  Otherwise take Cialis for erectile dysfunction without side effects on no nitroglycerin products  No active or resting chest pain shortness with or palpitations no new GI  issues since his colonoscopy  His back is feeling much better has some tightness in the back but otherwise much less radicular symptoms 1 coated baby aspirin a day for anticoagulation  Patient Care Team:  Alexander Garza DO as PCP - General  Alexander Garza DO as PCP - [facility] Attributed Provider  Patrick Rodríguez MD as Consulting Physician (Hematology and Oncology)     Review of Systems   Constitutional:  Negative for fatigue, fever and unexpected weight change.   Eyes:  Negative for visual disturbance.   Respiratory:  Negative for cough, chest tightness, shortness of breath and wheezing.    Cardiovascular:  Negative for chest pain, palpitations and leg swelling.   Gastrointestinal:  Negative for abdominal pain, blood in stool, nausea, rectal pain and vomiting.   Genitourinary:  Positive for  "frequency. Negative for dysuria, flank pain and hematuria.   Musculoskeletal:  Positive for arthralgias and back pain. Negative for myalgias.   Skin:  Negative for rash.   Neurological:  Negative for weakness, light-headedness, numbness and headaches.   Psychiatric/Behavioral:  Negative for behavioral problems, confusion, sleep disturbance and suicidal ideas.        Objective   Vitals:  /82 (BP Location: Right arm, Patient Position: Sitting, BP Cuff Size: Large adult)   Pulse 84   Temp 36.3 °C (97.3 °F) (Temporal)   Resp 16   Ht 1.803 m (5' 11\")   Wt 94.8 kg (209 lb)   SpO2 97%   BMI 29.15 kg/m²     PSA  Order: [id number]   Status: Final result       Dx: Prostate cancer screening    Test Result Released: Yes (seen)    2 Result Notes       Component  Ref Range & Units 9 mo ago 5 yr ago   Prostate Specific AG  <=4.00 ng/mL 2.05 1.84 R, CM   Resulting Agency [facility]            bolic panel  Order: [id number]   Status: Final result       Dx: Hypokalemia    Test Result Released: Yes (seen)    4 Result Notes   important suggestion  Newer results are available. Click to view them now.               Component  Ref Range & Units 3 mo ago  (1/23/25) 4 mo ago  (1/12/25) 9 mo ago  (8/15/24) 1 yr ago  (8/24/23) 2 yr ago  (2/14/23) 2 yr ago  (1/5/23) 3 yr ago  (1/28/22)   Glucose  74 - 99 mg/dL 85 95 92 82 94 87 98   Sodium  136 - 145 mmol/L 139 141 141 138 140 138 140   Potassium  3.5 - 5.3 mmol/L 4.5 3.1 Low  4.3 4.1 4.2 3.7 4.4   Chloride  98 - 107 mmol/L 106 111 High  107 101 104 104 103   Bicarbonate  21 - 32 mmol/L 30 21 26 29 28 26 31   Anion Gap  10 - 20 mmol/L 8 Low  12 12 12 12 12 10   Urea Nitrogen  6 - 23 mg/dL 17 16 25 High  20 22 16 16   Creatinine  0.50 - 1.30 mg/dL 0.96 0.68 0.88 0.90 1.03 0.87 0.97   eGFR  >60 mL/min/1.73m*2 85 >90 CM >90 CM       Comment: Calculations of estimated GFR are performed using the 2021 CKD-EPI Study Refit equation without the race variable for the IDMS-Traceable creatinine " methods.  https://jasn.asnjournals.org/content/early/2021/09/22/ASN.9692266462   Calcium  8.6 - 10.3 mg/dL 9.7 7.5 Low  9.1 10.0 10.2 9.5 9.4   [facility] Agency [facility]             Specimen Collected: 01/23/25 10:45 Last Resulted: 01/23/25 17:05       Internal (grade 3) hemorrhoids observed during retroflexion  Multiple medium diverticula of moderate severity in the sigmoid colon  Posterior anal fissure  The cecum appeared normal.        Narrative  Physical Exam  Vital sign review normal good blood pressure  Neck neck is supple no mass adenopathy bruits or rigidity  Cardiovascular RSR without significant murmurs gallop or ectopy  Chest chest is clear bilaterally.  Abdominal no organomegaly mass or rebound above colonoscopy reviewed showing internal hemorrhoids and perianal fistula fissure but today's abdominal exam is benign vascular no symmetric asymmetric edema Homans' sign is absent no sensorimotor vascular deficit  Assessment & Plan  Chronic midline back pain, unspecified back location    Orders:    Follow Up In [facility] - PCP - Established; Future    Erectile dysfunction due to diseases classified elsewhere    Orders:    tadalafil 20 mg tablet; Take 1 tablet (20 mg) by mouth every 7 days.    Rectal bleeding    Orders:    Follow Up In [facility] - PCP - Established; Future    Other pulmonary embolism with acute cor pulmonale, unspecified chronicity (Multi)    Orders:    Follow Up In [facility] - PCP - Established; Future    Medicare annual wellness visit, subsequent         Perianal fissure         Routine general medical examination at health care facility    Orders:    1 Year Follow Up In [facility] - PCP - Wellness Exam; Future            Seen hematology and has been off his Xarelto and only takes baby aspirin will be getting off of Xarelto coagulopathy repeat.  Continue  hydrochlorothiazide for uric acid stones  Topical nifedipine Xylocaine and steroid for perianal fissure  Continue to follow-up with his back surgeon but otherwise stable  Continue healthy routines and immunizations up to date colonoscopy up-to-date PSA is normal and up-to-date  Ballis if needed is on no nitroglycerin products orally  Check in 3 to 4 months [employer] maintain increasing liquids and bulk forming agents to prevent straining further rectal bleeding or shortness of breath  He will be repeating CT and also ultrasound of the legs to see if the any active clot per oncology  @discharge  The above diagnosis and treatment plan was discussed with the patient patient will continue appropriate diet and exercise as reviewed  Patient will recheck earlier if any interval problems of significance or clinical worsening of the above problems.  Agrees above surveillance.  All question were addressed regarding above meds

## 2025-05-22 VITALS
BODY MASS INDEX: 29.26 KG/M2 | DIASTOLIC BLOOD PRESSURE: 82 MMHG | OXYGEN SATURATION: 97 % | HEART RATE: 84 BPM | WEIGHT: 209 LBS | RESPIRATION RATE: 16 BRPM | SYSTOLIC BLOOD PRESSURE: 126 MMHG | HEIGHT: 71 IN | TEMPERATURE: 97.3 F

## 2025-05-22 ASSESSMENT — ENCOUNTER SYMPTOMS
CHEST TIGHTNESS: 0
FEVER: 0
CONFUSION: 0
FREQUENCY: 1
NAUSEA: 0
RECTAL PAIN: 0
ARTHRALGIAS: 1
NUMBNESS: 0
SHORTNESS OF BREATH: 0
HEMATURIA: 0
BLOOD IN STOOL: 0
WEAKNESS: 0
FLANK PAIN: 0
COUGH: 0
LIGHT-HEADEDNESS: 0
VOMITING: 0
UNEXPECTED WEIGHT CHANGE: 0
PALPITATIONS: 0
MYALGIAS: 0
BACK PAIN: 1
FATIGUE: 0
HEADACHES: 0
ABDOMINAL PAIN: 0
SLEEP DISTURBANCE: 0
DYSURIA: 0
WHEEZING: 0

## 2025-07-08 NOTE — PROGRESS NOTES
Chief Complaint   Patient presents with    Right Knee - Follow-up, Osteoarthritis     S/p CSI 2/27/25     History of Present Illness:   7/10/2025: We gave him a cortisone injection into the right knee on 2/27/25. He continues to get good relief with injections and would like to repeat this today. He also continues to work out and is trying to build up his strength.     2/27/25: We gave him a cortisone injection into the right knee on 9/26/24. He did get good relief with the injection but it is starting to bother him again. He would like to repeat the cortisone injection today. He has aching pain and soreness in his left mid-foot today. He is now on Xarelto.     9/26/24: Right knee cortisone injection on 05/02/24, worked well. He would like a repeat injection today. He is getting a lumbar fusion by Dr. Jacobsen at the [facility] in November.     5/2/24: He had a gel injection with gave some relief. Would like a cortisone injection today. He notes that he is getting a back ablation soon. He has been doing his exercises but states that he has been working too much.     10/23/23: Patient presents for follow-up from his right knee scope with 20% medial meniscectomy. States that he is doing well has a little bit of stiffness in the knee though although he has low back issues and is stiff in general on the right side. Denies any instability in the knee.     Imaging:  X-rays right knee: No acute fractures or dislocations.  Mild to moderate medial compartment arthritis    Assessment:   Right knee arthritis flare    Plan:  We reviewed the role of imaging, physical therapy, injections and the time frame to healing and correlation with outcome.  Continue with activities as tolerated.  Ice and NSAIDs as needed.   We gave him a cortisone injection in the right knee today.  Follow-up as needed    L Inj/Asp: R knee on 7/10/2025 10:49 AM  Indications: pain  Details: 22 G needle, anterolateral approach  Medications: 8 mL  lidocaine 10 mg/mL (1 %); 40 mg triamcinolone acetonide 40 mg/mL  Outcome: tolerated well, no immediate complications  Procedure, treatment alternatives, risks and benefits explained, specific risks discussed. Consent was given by the patient. Immediately prior to procedure a time out was called to verify the correct patient, procedure, equipment, support staff and site/side marked as required. Patient was prepped and draped in the usual sterile fashion.          Physical Exam:  Well-nourished, well-developed. No acute distress. Alert and oriented x3. Responds appropriately to questioning. Good mood. Normal affect.  Physical Exam  Right Knee:  ROM: Full motion  Positive Jcarlos´s test/PositiveApley Grind  Neurovascular exam normal distally  Palpable pedal pulse and good cap refill  Tenderness along medial joint line    Left foot: Moderate pain over the first MTT joint. Great toe intact extensor and flexor.    Review of Systems:  GENERAL: Negative for malaise, significant weight loss, fever  MUSCULOSKELETAL: See HPI  NEURO:  Negative     Past Medical History:   Diagnosis Date    Acute maxillary sinusitis, unspecified 11/27/2019    Acute non-recurrent maxillary sinusitis    Allergic 1990    Arthritis     BRBPR (bright red blood per rectum)     Calculus of kidney 03/14/2019    Recurrent kidney stones    Calculus of kidney 03/10/2020    Uric acid kidney stone    Cancer (Multi) Squamous cell/skin. 2022    Cervicogenic headache 12/22/2020    Headache, cervicogenic    Diverticulosis     Dorsalgia, unspecified     Upper back pain    Epigastric pain 05/29/2020    Abdominal wall pain in epigastric region    Erectile dysfunction     Ganglion, right hand 01/04/2023    Ganglion, finger of right hand    GERD (gastroesophageal reflux disease) 2022.  Has not been a problem lately    Gout     Hyperlipidemia     Insect bite (nonvenomous) of other part of head, initial encounter 09/08/2021    Insect bite of cheek, initial encounter     Pain due to genitourinary prosthetic devices, implants and grafts, initial encounter     Pain due to ureteral stent    Personal history of other diseases of the musculoskeletal system and connective tissue     History of back pain    Personal history of other diseases of urinary system 03/10/2020    History of hematuria    Personal history of other specified conditions 08/20/2021    History of chronic fatigue    Personal history of other specified conditions 02/19/2020    History of left flank pain    Personal history of urinary calculi 06/25/2020    History of renal calculi    Personal history of urinary calculi     History of kidney stones    Pulmonary embolism     Bilateral    Seasonal allergies     Sleep apnea     CPAP    Varicella As a child       Medication Documentation Review Audit       Reviewed by Barbra Laboy MA (Medical Assistant) on 07/10/25 at 1020      Medication Order Taking? Sig Documenting Provider Last Dose Status   acetaminophen (TYLENOL ORAL) [id number] No Take 650 mg by mouth 3 times a day. CAPS Historical Provider, MD 5/6/2025 Active   allopurinol (Zyloprim) 300 mg tablet [id number] No Take 0.5 tablets (150 mg) by mouth once daily. Alexander Garza DO 5/6/2025 Active   ASPIRIN ORAL [id number]  Take 81 mg by mouth once daily. Historical Provider, MD  Active   cetirizine (ZyrTEC) 10 mg tablet [id number] No Take 1 tablet (10 mg) by mouth once daily. Historical Provider, MD Past Week Active   cholecalciferol (Vitamin D-3) 50 mcg (2,000 unit) capsule [id number] No Take 1 capsule (50 mcg) by mouth once daily. Historical Provider, MD 5/6/2025 Active   fish oil concentrate (Omega-3) 120-180 mg capsule [id number] No Take 1 tablet by mouth 1 (one) time each day. Historical Provider, MD Not Taking Active   fluticasone (Flonase) 50 mcg/actuation nasal spray [id number] No Administer 1 spray into each nostril once daily. Historical Provider, MD 5/6/2025 Active   hydroCHLOROthiazide (Microzide) 12.5 mg tablet [id number]  No Take 1 tablet (12.5 mg) by mouth once daily. Alexander Garza DO 5/6/2025 Active   ibuprofen 200 mg tablet [id number] No Take 1 tablet (200 mg) by mouth every 6 hours if needed for mild pain (1 - 3). Historical Provider, MD Past Month Active   magnesium glycinate 100 mg tablet [id number] No Take 500 mg by mouth once daily at bedtime. Historical Provider, MD 5/6/2025 Active   melatonin 3 mg tablet [id number] No Take 1 tablet (3 mg) by mouth once daily at bedtime. Historical Provider, MD 5/6/2025 Active   MILK THISTLE, BULK, MISC [id number] No 1 capsule every other day. Historical Provider, MD Past Month Active   multivitamin tablet [id number] No Take 1 tablet by mouth once daily. Historical Provider, MD 5/6/2025 Active   NON FORMULARY [id number] No Take 1 each by mouth once daily. Hyaluronic acid Historical Provider, MD Past Month Active   potassium citrate CR (Urocit-K-15) 15 mEq ER tablet [id number] No Take 1 tablet (15 mEq) by mouth once daily. Historical Provider, MD 5/6/2025 Active   saccharomyces boulardii (Florastor) 250 mg capsule [id number] No Take 1 capsule (250 mg) by mouth every other day. Historical Provider, MD Past Month Active   tadalafil 20 mg tablet [id number]  Take 1 tablet (20 mg) by mouth every 7 days. Alexander Garza DO  Active   tiZANidine (Zanaflex) 4 mg tablet [id number] No Take 1 tablet (4 mg) by mouth 2 times a day as needed for muscle spasms. Alexander Garza DO Past Week Active   triamcinolone (Kenalog) 0.1 % ointment [id number] No Apply topically 2 times a day as needed for irritation or rash. Alexander Garza DO Past Month Active                    Allergies   Allergen Reactions    Adhesive Hives     hives for one week. :    Mold Unknown    Morphine Hives     Very Severe Reaction: Swelling       Social History     Socioeconomic History    Marital status:      Spouse name: Not on file    Number of children: Not on file    Years of education: Not on file    Highest education level: Not on file    Occupational History    Not on file   Tobacco Use    Smoking status: Never     Passive exposure: Past    Smokeless tobacco: Never   Vaping Use    Vaping status: Never Used   Substance and Sexual Activity    Alcohol use: Never    Drug use: Never    Sexual activity: Yes     Partners: Female     Birth control/protection: Post-menopausal, Female Sterilization   Other Topics Concern    Not on file   Social History Narrative    Not on file     Social Drivers of Health     Financial Resource Strain: Low Risk  (1/15/2025)    Overall Financial Resource Strain (CARDIA)     Difficulty of Paying Living Expenses: Not hard at all   Food Insecurity: No Food Insecurity (1/15/2025)    Hunger Vital Sign     Worried About Running Out of Food in the Last Year: Never true     Ran Out of Food in the Last Year: Never true   Transportation Needs: No Transportation Needs (1/15/2025)    PRAPARE - Transportation     Lack of Transportation (Medical): No     Lack of Transportation (Non-Medical): No   Physical Activity: Inactive (1/15/2025)    Exercise Vital Sign     Days of Exercise per Week: 0 days     Minutes of Exercise per Session: 0 min   Stress: No Stress Concern Present (1/15/2025)    [employer] - Occupational Stress Questionnaire     Feeling of Stress : Not at all   Social Connections: Socially Integrated (1/15/2025)    Social Connection and Isolation Panel [NHANES]     Frequency of Communication with Friends and Family: More than three times a week     Frequency of Social Gatherings with Friends and Family: More than three times a week     Attends [employer] Services: More than 4 times per year     Active Member of Clubs or Organizations: Yes     Attends Club or Organization Meetings: More than 4 times per year     Marital Status:    Intimate Partner Violence: Not At Risk (1/15/2025)    Humiliation, Afraid, Rape, and Kick questionnaire     Fear of Current or Ex-Partner: No     Emotionally Abused:  No     Physically Abused: No     Sexually Abused: No   Housing Stability: Low Risk  (1/15/2025)    Housing Stability Vital Sign     Unable to Pay for Housing in the Last Year: No     Number of Times Moved in the Last Year: 0     Homeless in the Last Year: No       Past Surgical History:   Procedure Laterality Date    ADENOIDECTOMY  Tonsils?  1960    BACK SURGERY  Nov 2024    CIRCUMCISION, PRIMARY  1954    COLONOSCOPY      EYE SURGERY  Cataracts/new lnew lenses.  2020?    GANGLION CYST EXCISION      KIDNEY STONE SURGERY  1985 / 2021    LITHOTRIPSY      OTHER SURGICAL HISTORY  06/30/2022    Percutaneous nephrolithotomy    SEPTOPLASTY      SINUS SURGERY  2021?    SPINAL FUSION      TONSILLECTOMY  1060?    WISDOM TOOTH EXTRACTION  1980?       No results found.

## 2025-07-09 ENCOUNTER — TELEPHONE (OUTPATIENT)
Dept: SURGERY | Facility: CLINIC | Age: 70
End: 2025-07-09
Payer: MEDICARE

## 2025-07-10 ENCOUNTER — OFFICE VISIT (OUTPATIENT)
Dept: ORTHOPEDIC SURGERY | Facility: CLINIC | Age: 70
End: 2025-07-10
Payer: MEDICARE

## 2025-07-10 DIAGNOSIS — M17.11 PRIMARY OSTEOARTHRITIS OF RIGHT KNEE: Primary | ICD-10-CM

## 2025-07-10 RX ORDER — TRIAMCINOLONE ACETONIDE 40 MG/ML
40 INJECTION, SUSPENSION INTRA-ARTICULAR; INTRAMUSCULAR
Status: COMPLETED | OUTPATIENT
Start: 2025-07-10 | End: 2025-07-10

## 2025-07-10 RX ORDER — LIDOCAINE HYDROCHLORIDE 10 MG/ML
8 INJECTION, SOLUTION INFILTRATION; PERINEURAL
Status: COMPLETED | OUTPATIENT
Start: 2025-07-10 | End: 2025-07-10

## 2025-07-10 RX ADMIN — TRIAMCINOLONE ACETONIDE 40 MG: 40 INJECTION, SUSPENSION INTRA-ARTICULAR; INTRAMUSCULAR at 10:49

## 2025-07-10 RX ADMIN — LIDOCAINE HYDROCHLORIDE 8 ML: 10 INJECTION, SOLUTION INFILTRATION; PERINEURAL at 10:49

## 2025-07-14 ENCOUNTER — APPOINTMENT (OUTPATIENT)
Dept: PRIMARY CARE | Facility: CLINIC | Age: 70
End: 2025-07-14
Payer: MEDICARE

## 2025-07-14 VITALS
DIASTOLIC BLOOD PRESSURE: 78 MMHG | OXYGEN SATURATION: 97 % | TEMPERATURE: 97.5 F | RESPIRATION RATE: 16 BRPM | HEIGHT: 71 IN | WEIGHT: 209 LBS | HEART RATE: 68 BPM | BODY MASS INDEX: 29.26 KG/M2 | SYSTOLIC BLOOD PRESSURE: 116 MMHG

## 2025-07-14 DIAGNOSIS — M77.12 LEFT LATERAL EPICONDYLITIS: ICD-10-CM

## 2025-07-14 DIAGNOSIS — G56.01 CARPAL TUNNEL SYNDROME OF RIGHT WRIST: Primary | ICD-10-CM

## 2025-07-14 PROCEDURE — 3074F SYST BP LT 130 MM HG: CPT | Performed by: FAMILY MEDICINE

## 2025-07-14 PROCEDURE — 1159F MED LIST DOCD IN RCRD: CPT | Performed by: FAMILY MEDICINE

## 2025-07-14 PROCEDURE — 1157F ADVNC CARE PLAN IN RCRD: CPT | Performed by: FAMILY MEDICINE

## 2025-07-14 PROCEDURE — 3008F BODY MASS INDEX DOCD: CPT | Performed by: FAMILY MEDICINE

## 2025-07-14 PROCEDURE — 1036F TOBACCO NON-USER: CPT | Performed by: FAMILY MEDICINE

## 2025-07-14 PROCEDURE — 3078F DIAST BP <80 MM HG: CPT | Performed by: FAMILY MEDICINE

## 2025-07-14 PROCEDURE — 99213 OFFICE O/P EST LOW 20 MIN: CPT | Performed by: FAMILY MEDICINE

## 2025-07-14 PROCEDURE — 1160F RVW MEDS BY RX/DR IN RCRD: CPT | Performed by: FAMILY MEDICINE

## 2025-07-14 NOTE — PROGRESS NOTES
Subjective   Patient ID: John Garvin is a 70 y.o. male who presents for Carpal Tunnel.  HPI  Pleasant individual is here really because of ongoing workup of his neck with significant arthritis.  Been taking Zanaflex arthritis intermittently with some radiation into the left upper arm and left mid arm.  However recently has had pain with flexion of the elbow but is able to abduct and flex his left shoulder well he has been followed by Dr. Jacobsen for neck MRI CT of the neck.  He does take his allopurinol for hyperuricemia and hydrochlorothiazide for uric acid stone  He also has tingling numbness of the right hand like to have evaluation of potential carpal tunnel otherwise no fall or recent injury range of motion neck fairly good has no chest pain rating to the shoulders or neck normal fish oil as well as vitamin D Zyrtec and Flonase and also takes magnesium glycinate for lower extremity cramping as well as 1 baby aspirin a day and melatonin at nighttime  Shortness of breath or chest pain at this time no new GI  issues after careful review did review MRI of the neck with the patient  Review of Systems   Constitutional:  Negative for fatigue, fever and unexpected weight change.   Eyes:  Negative for visual disturbance.   Respiratory:  Negative for cough, chest tightness and shortness of breath.    Cardiovascular:  Negative for chest pain, palpitations and leg swelling.   Gastrointestinal:  Negative for abdominal pain, blood in stool, nausea, rectal pain and vomiting.   Genitourinary:  Positive for frequency and hematuria. Negative for dysuria and flank pain.   Musculoskeletal:  Positive for arthralgias, back pain, joint swelling, myalgias and neck pain.   Skin:  Negative for rash.   Neurological:  Positive for numbness (Median nerve distribution of the right palm). Negative for weakness, light-headedness and headaches.   Psychiatric/Behavioral:  Negative for behavioral problems, confusion, decreased concentration, sleep  "disturbance and suicidal ideas.        Objective   /78 (BP Location: Right arm, Patient Position: Sitting, BP Cuff Size: Large adult)   Pulse 68   Temp 36.4 °C (97.5 °F) (Temporal)   Resp 16   Ht 1.803 m (5' 11\")   Wt 94.8 kg (209 lb)   SpO2 97%   BMI 29.15 kg/m²   MR cervical spine wo IV contrast  Order: [id number]  Impression    IMPRESSION:    Developmental cervical canal stenosis with mild superimposed degenerative  changes causing mild cord compression at C3-4 through C5-6 and multilevel  bony foraminal narrowing as outlined above.    Anatomic Variant:  None.  Assume 7 cervical vertebrae with counting from  the craniocervical junction.   CT angio chest for pulmonary embolism  Status: Final result     PACS Images     Show images for CT angio chest for pulmonary embolism  Signed by    Signed Time Phone Pager   Rene Wong MD 4/11/2025 17:42 [phone]     Exam Information    Status Exam Begun Exam Ended   Final 4/11/2025 15:38 4/11/2025 16:00     Study Result    Narrative & Impression   Interpreted By:  Rene Wong,   STUDY:  CT ANGIO CHEST FOR PULMONARY EMBOLISM;  4/11/2025 4:00 pm      INDICATION:  Signs/Symptoms:bilateral pulmonary emboli, followup.      ,I26.09 Other pulmonary embolism with acute cor pulmonale      COMPARISON:  01/12/2025      ACCESSION NUMBER(S):  [id number]      ORDERING CLINICIAN:  JOHNNIE RAMON      TECHNIQUE:  Helical data acquisition of the chest was obtained after intravenous  administration of 75 ML Omnipaque 350, as per PE protocol. Images  were reformatted in coronal and sagittal planes. Axial and coronal  maximum intensity projection (MIP) images were created and reviewed.      FINDINGS:  POTENTIAL LIMITATIONS OF THE STUDY: None      HEART AND VESSELS:  There are no discrete filling defects within main pulmonary artery  and its branches to suggest acute pulmonary embolism. Main pulmonary  artery and its branches are normal in caliber.      The thoracic " aorta normal in course and caliber.  Coronary artery calcification. . Please note, the study is not  optimized for evaluation of coronary arteries.      Cardiomegaly.      There is no pericardial effusion seen.      MEDIASTINUM AND DEBORAH, LOWER NECK AND AXILLA:  The visualized thyroid gland is within normal limits.  No evidence of thoracic lymphadenopathy by CT criteria.  Tiny hiatal hernia.      LUNGS AND AIRWAYS:  The trachea and central airways are patent. No endobronchial lesion  is seen.      The bilateral lungs are hyperinflated. Large calcified granulomas in  the lung bases bilaterally. Likely bibasilar atelectasis and/or  minimal scarring.          UPPER ABDOMEN:  The visualized subdiaphragmatic structures demonstrate liver  hypodensities, similar the prior exam. Also bilateral renal  hypodensities and similar the prior exam. Large upper pole left renal  stone, unchanged.              CHEST WALL AND OSSEOUS STRUCTURES:  Chest wall is within normal limits.  No acute osseous pathology.There are no suspicious osseous lesions.      IMPRESSION:  1. No evidence of acute pulmonary embolism.  2. Large calcified granulomas in the lung bases.  3. Cardiomegaly with coronary artery calcification.      MACRO:  None   CT head wo IV contrast  Status: Final result     PACS Images     Show images for CT head wo IV contrast  Signed by    Signed Time Phone Pager   Asmita Gerardo MD 3/10/2025 14:12 [phone]     Exam Information    Status Exam Begun Exam Ended   Final 3/10/2025 13:56 3/10/2025 14:06     Study Result    Narrative & Impression   Interpreted By:  Asmita Gerardo,   STUDY:  CT HEAD WO IV CONTRAST;  3/10/2025 2:06 pm      INDICATION:  Signs/Symptoms:injury.      COMPARISON:  02/08/2022      ACCESSION NUMBER(S):  [id number]      ORDERING CLINICIAN:  JOHNNIE HERNANDEZ      TECHNIQUE:  Noncontrast axial CT scan of head was performed. Multiplanar  reconstructions      FINDINGS:  No acute intracranial  hemorrhage, mass effect, midline shift, or  herniation. Small area of encephalomalacia in the left occipital  lobe. No evidence of hydrocephalus. The ventricles and sulci are  unremarkable for age. Bilateral ethmoid and frontal sinus mucosal  thickening. No acute osseous abnormality of the calvarium. No  destructive bone lesion.      IMPRESSION:  No acute intracranial abnormality. Consider follow-up with MRI as  warranted.                   Physical Exam  All signs reviewed and normal  Neck and the very minimal tenderness to the C6-C7 midline area and lesser amount of paravertebral tenderness good flexion extension and rotation is noted Spurling test is negative  Shoulder exam adequate flexion and adequate abduction of the shoulder with negative bucket-handle test.  Impingement test is negative good internal rotation of the left shoulder minimal tenderness however of the left subacromial area in the left trap area.  Orthopedic pain of the left arm clearly reproduced over flexion and palpation of the left lateral epicondyle with a hint of osseous swelling.  There is no left arm distal neurovascular deficits  Neurological positive Phalen's and Tinel's test of the right palm.  Otherwise no hand synovitis noted.  Chest chest is clear  Cardiovascular RSR without significant murmurs gallop or ectopy  Neck palpation-no masses adenopathy bruits rigidity.  No JVD      Assessment/Plan   Problem List Items Addressed This Visit    None  Did review MRI of the neck compatible with mild stenosis from arthritis is followed by Dr. Bravo  A lot of the left elbow pain which is his main complaint today is pain over the left lateral epicondyle will order left elbow x-ray for potential hypertrophic changes i.e. DJD of the left lateral epicondyle I told him to avoid locking his left elbow.  He can use warm compresses in the morning ice compress in the evening I will call him with the above x-ray and eventually may return to orthopedics  that he sees Dr. Aviles.  Follow-up with his doctor or to follow his neck arthritis  Otherwise exam compatible with carpal tunnel syndrome of the right hand and for his request and my recommendation I did order an EMG I will notify him when it comes back otherwise maintain night splint on his right wrist  Also can take 2 tablets 2-3 times a day of the Advil for the neck or elbow or right wrist in regards to the above prefer to follow-up in 4 to 6 weeks to recheck the above orthopedic exam will notify with the above x-ray of the left elbow  @discharge  The above diagnosis and treatment plan was discussed with the patient patient will continue appropriate diet and exercise as reviewed  Patient will recheck earlier if any interval problems of significance or clinical worsening of the above problems.  Agrees above surveillance.  All question were addressed regarding above meds

## 2025-07-17 PROBLEM — M77.12 LEFT LATERAL EPICONDYLITIS: Status: ACTIVE | Noted: 2025-07-17

## 2025-07-17 PROBLEM — G56.01 CARPAL TUNNEL SYNDROME OF RIGHT WRIST: Status: ACTIVE | Noted: 2025-07-17

## 2025-07-17 ASSESSMENT — ENCOUNTER SYMPTOMS
FATIGUE: 0
CHEST TIGHTNESS: 0
NAUSEA: 0
LIGHT-HEADEDNESS: 0
NUMBNESS: 1
VOMITING: 0
ARTHRALGIAS: 1
RECTAL PAIN: 0
HEMATURIA: 1
ABDOMINAL PAIN: 0
BLOOD IN STOOL: 0
SHORTNESS OF BREATH: 0
NECK PAIN: 1
FEVER: 0
CONFUSION: 0
FLANK PAIN: 0
BACK PAIN: 1
SLEEP DISTURBANCE: 0
UNEXPECTED WEIGHT CHANGE: 0
JOINT SWELLING: 1
DYSURIA: 0
PALPITATIONS: 0
WEAKNESS: 0
COUGH: 0
DECREASED CONCENTRATION: 0
MYALGIAS: 1
HEADACHES: 0
FREQUENCY: 1

## 2025-07-22 ENCOUNTER — HOSPITAL ENCOUNTER (OUTPATIENT)
Dept: RADIOLOGY | Facility: CLINIC | Age: 70
Discharge: HOME | End: 2025-07-22
Payer: MEDICARE

## 2025-07-22 DIAGNOSIS — M77.12 LEFT LATERAL EPICONDYLITIS: ICD-10-CM

## 2025-07-22 PROCEDURE — 73080 X-RAY EXAM OF ELBOW: CPT | Mod: LEFT SIDE | Performed by: RADIOLOGY

## 2025-07-22 PROCEDURE — 73080 X-RAY EXAM OF ELBOW: CPT | Mod: LT

## 2025-08-05 ENCOUNTER — HOSPITAL ENCOUNTER (OUTPATIENT)
Dept: NEUROLOGY | Facility: HOSPITAL | Age: 70
Discharge: HOME | End: 2025-08-05
Payer: MEDICARE

## 2025-08-05 DIAGNOSIS — G56.01 CARPAL TUNNEL SYNDROME OF RIGHT WRIST: ICD-10-CM

## 2025-08-05 PROCEDURE — 95886 MUSC TEST DONE W/N TEST COMP: CPT

## 2025-08-12 ENCOUNTER — APPOINTMENT (OUTPATIENT)
Dept: RADIOLOGY | Facility: CLINIC | Age: 70
End: 2025-08-12
Payer: MEDICARE

## 2025-08-18 ENCOUNTER — TELEPHONE (OUTPATIENT)
Dept: SURGERY | Facility: CLINIC | Age: 70
End: 2025-08-18

## 2025-08-18 ENCOUNTER — APPOINTMENT (OUTPATIENT)
Dept: PRIMARY CARE | Facility: CLINIC | Age: 70
End: 2025-08-18
Payer: MEDICARE

## 2025-08-18 VITALS
SYSTOLIC BLOOD PRESSURE: 130 MMHG | WEIGHT: 216 LBS | OXYGEN SATURATION: 97 % | TEMPERATURE: 97.7 F | BODY MASS INDEX: 30.24 KG/M2 | DIASTOLIC BLOOD PRESSURE: 78 MMHG | HEART RATE: 69 BPM | RESPIRATION RATE: 16 BRPM | HEIGHT: 71 IN

## 2025-08-18 DIAGNOSIS — G89.29 CHRONIC MIDLINE BACK PAIN, UNSPECIFIED BACK LOCATION: ICD-10-CM

## 2025-08-18 DIAGNOSIS — M77.12 LATERAL EPICONDYLITIS OF LEFT ELBOW: ICD-10-CM

## 2025-08-18 DIAGNOSIS — G56.01 CARPAL TUNNEL SYNDROME OF RIGHT WRIST: Primary | ICD-10-CM

## 2025-08-18 DIAGNOSIS — I26.09 OTHER PULMONARY EMBOLISM WITH ACUTE COR PULMONALE, UNSPECIFIED CHRONICITY (MULTI): ICD-10-CM

## 2025-08-18 DIAGNOSIS — M54.9 CHRONIC MIDLINE BACK PAIN, UNSPECIFIED BACK LOCATION: ICD-10-CM

## 2025-08-18 DIAGNOSIS — K60.2 PERIANAL FISSURE: ICD-10-CM

## 2025-08-18 PROCEDURE — 1159F MED LIST DOCD IN RCRD: CPT | Performed by: FAMILY MEDICINE

## 2025-08-18 PROCEDURE — 1036F TOBACCO NON-USER: CPT | Performed by: FAMILY MEDICINE

## 2025-08-18 PROCEDURE — 3078F DIAST BP <80 MM HG: CPT | Performed by: FAMILY MEDICINE

## 2025-08-18 PROCEDURE — 99213 OFFICE O/P EST LOW 20 MIN: CPT | Performed by: FAMILY MEDICINE

## 2025-08-18 PROCEDURE — 3075F SYST BP GE 130 - 139MM HG: CPT | Performed by: FAMILY MEDICINE

## 2025-08-18 PROCEDURE — 1160F RVW MEDS BY RX/DR IN RCRD: CPT | Performed by: FAMILY MEDICINE

## 2025-08-18 PROCEDURE — 3008F BODY MASS INDEX DOCD: CPT | Performed by: FAMILY MEDICINE

## 2025-08-21 PROBLEM — M77.12 LATERAL EPICONDYLITIS OF LEFT ELBOW: Status: ACTIVE | Noted: 2025-08-21

## 2025-08-21 ASSESSMENT — ENCOUNTER SYMPTOMS
SLEEP DISTURBANCE: 0
ABDOMINAL PAIN: 0
DYSURIA: 0
HEMATURIA: 0
CONFUSION: 0
ARTHRALGIAS: 1
NAUSEA: 0
COUGH: 0
FREQUENCY: 0
PALPITATIONS: 0
UNEXPECTED WEIGHT CHANGE: 0
RECTAL PAIN: 0
BLOOD IN STOOL: 0
MYALGIAS: 1
FATIGUE: 0
NECK PAIN: 0
WEAKNESS: 1
FLANK PAIN: 0
CHEST TIGHTNESS: 0
NUMBNESS: 1
LIGHT-HEADEDNESS: 0
HEADACHES: 0
SHORTNESS OF BREATH: 0
FEVER: 0

## 2025-08-25 ENCOUNTER — APPOINTMENT (OUTPATIENT)
Dept: ORTHOPEDIC SURGERY | Facility: CLINIC | Age: 70
End: 2025-08-25
Payer: MEDICARE

## 2025-08-25 DIAGNOSIS — G56.01 CARPAL TUNNEL SYNDROME OF RIGHT WRIST: ICD-10-CM

## 2025-08-25 DIAGNOSIS — M19.022 ARTHRITIS OF LEFT ELBOW: ICD-10-CM

## 2025-08-25 DIAGNOSIS — G56.02 CARPAL TUNNEL SYNDROME OF LEFT WRIST: Primary | ICD-10-CM

## 2025-08-25 PROCEDURE — 99214 OFFICE O/P EST MOD 30 MIN: CPT | Performed by: ORTHOPAEDIC SURGERY

## 2025-08-25 PROCEDURE — 1159F MED LIST DOCD IN RCRD: CPT | Performed by: ORTHOPAEDIC SURGERY

## 2025-08-25 PROCEDURE — 20605 DRAIN/INJ JOINT/BURSA W/O US: CPT | Performed by: ORTHOPAEDIC SURGERY

## 2025-08-25 PROCEDURE — 1036F TOBACCO NON-USER: CPT | Performed by: ORTHOPAEDIC SURGERY

## 2025-08-25 PROCEDURE — 20526 THER INJECTION CARP TUNNEL: CPT | Performed by: ORTHOPAEDIC SURGERY

## 2025-08-25 RX ORDER — LIDOCAINE HYDROCHLORIDE 10 MG/ML
1 INJECTION, SOLUTION INFILTRATION; PERINEURAL
Status: COMPLETED | OUTPATIENT
Start: 2025-08-25 | End: 2025-08-25

## 2025-08-25 RX ORDER — TRIAMCINOLONE ACETONIDE 10 MG/ML
10 INJECTION, SUSPENSION INTRA-ARTICULAR; INTRALESIONAL
Status: COMPLETED | OUTPATIENT
Start: 2025-08-25 | End: 2025-08-25

## 2025-08-25 RX ORDER — TRIAMCINOLONE ACETONIDE 40 MG/ML
20 INJECTION, SUSPENSION INTRA-ARTICULAR; INTRAMUSCULAR
Status: COMPLETED | OUTPATIENT
Start: 2025-08-25 | End: 2025-08-25

## 2025-08-25 RX ORDER — LIDOCAINE HYDROCHLORIDE 10 MG/ML
1.5 INJECTION, SOLUTION INFILTRATION; PERINEURAL
Status: COMPLETED | OUTPATIENT
Start: 2025-08-25 | End: 2025-08-25

## 2025-08-25 RX ADMIN — LIDOCAINE HYDROCHLORIDE 1.5 ML: 10 INJECTION, SOLUTION INFILTRATION; PERINEURAL at 10:55

## 2025-08-25 RX ADMIN — LIDOCAINE HYDROCHLORIDE 1 ML: 10 INJECTION, SOLUTION INFILTRATION; PERINEURAL at 10:55

## 2025-08-25 RX ADMIN — TRIAMCINOLONE ACETONIDE 20 MG: 40 INJECTION, SUSPENSION INTRA-ARTICULAR; INTRAMUSCULAR at 10:55

## 2025-08-25 RX ADMIN — TRIAMCINOLONE ACETONIDE 10 MG: 10 INJECTION, SUSPENSION INTRA-ARTICULAR; INTRALESIONAL at 10:54

## 2025-08-25 RX ADMIN — TRIAMCINOLONE ACETONIDE 10 MG: 10 INJECTION, SUSPENSION INTRA-ARTICULAR; INTRALESIONAL at 10:55

## 2025-08-25 RX ADMIN — LIDOCAINE HYDROCHLORIDE 1 ML: 10 INJECTION, SOLUTION INFILTRATION; PERINEURAL at 10:54

## 2025-09-01 ENCOUNTER — APPOINTMENT (OUTPATIENT)
Dept: RADIOLOGY | Facility: HOSPITAL | Age: 70
End: 2025-09-01
Payer: MEDICARE

## 2025-09-02 ENCOUNTER — HOSPITAL ENCOUNTER (OUTPATIENT)
Dept: RADIOLOGY | Facility: CLINIC | Age: 70
Discharge: HOME | End: 2025-09-02
Payer: MEDICARE

## 2025-09-02 DIAGNOSIS — N20.0 LEFT RENAL STONE: ICD-10-CM

## 2025-09-02 PROCEDURE — 74018 RADEX ABDOMEN 1 VIEW: CPT | Performed by: RADIOLOGY

## 2025-09-02 PROCEDURE — 74018 RADEX ABDOMEN 1 VIEW: CPT

## 2025-09-11 ENCOUNTER — APPOINTMENT (OUTPATIENT)
Dept: UROLOGY | Facility: CLINIC | Age: 70
End: 2025-09-11
Payer: MEDICARE

## 2025-09-22 ENCOUNTER — APPOINTMENT (OUTPATIENT)
Dept: ORTHOPEDIC SURGERY | Facility: CLINIC | Age: 70
End: 2025-09-22
Payer: MEDICARE

## 2025-11-10 ENCOUNTER — APPOINTMENT (OUTPATIENT)
Dept: PRIMARY CARE | Facility: CLINIC | Age: 70
End: 2025-11-10
Payer: MEDICARE

## 2026-05-29 ENCOUNTER — APPOINTMENT (OUTPATIENT)
Dept: PRIMARY CARE | Facility: CLINIC | Age: 71
End: 2026-05-29
Payer: MEDICARE

## (undated) DEVICE — SPONGE,NEURO,1"X3",XR,STRL,LF,10/PK: Brand: MEDLINE

## (undated) DEVICE — NEEDLE HYPO 25GA L1.5IN BLU POLYPR HUB S STL REG BVL STR

## (undated) DEVICE — TELFA NON-ADHERENT ABSORBENT DRESSING: Brand: TELFA

## (undated) DEVICE — INTENDED FOR TISSUE SEPARATION, AND OTHER PROCEDURES THAT REQUIRE A SHARP SURGICAL BLADE TO PUNCTURE OR CUT.: Brand: BARD-PARKER ® CARBON RIB-BACK BLADES

## (undated) DEVICE — KIT,ANTI FOG,W/SPONGE & FLUID,SOFT PACK: Brand: MEDLINE

## (undated) DEVICE — TUBING SET, UNIDRIVE/UNIDRIVE SIII: Brand: N.A.

## (undated) DEVICE — SINGLE PORT MANIFOLD: Brand: NEPTUNE 2

## (undated) DEVICE — TOWEL,OR,DSP,ST,BLUE,STD,4/PK,20PK/CS: Brand: MEDLINE

## (undated) DEVICE — SHAVER BLADE, Ø 4 MM, 65°, 5X STERILE: Brand: N.A.

## (undated) DEVICE — PACK,EENT,TURBAN DRAPE,PK II: Brand: MEDLINE

## (undated) DEVICE — SYRINGE IRRIG 60ML SFT PLIABLE BLB EZ TO GRP 1 HND USE W/

## (undated) DEVICE — SPLINT 1522000 20PK PAIR SIMPLESPLINTS

## (undated) DEVICE — TUBING, SUCTION, 1/4" X 10', STRAIGHT: Brand: MEDLINE

## (undated) DEVICE — SUTURE CHROMIC GUT SZ 3-0 L27IN ABSRB BRN L26MM SH 1/2 CIR G122H

## (undated) DEVICE — GAUZE,SPONGE,4"X4",16PLY,XRAY,STRL,LF: Brand: MEDLINE

## (undated) DEVICE — SUTURE PROL SZ 2-0 L30IN NONABSORBABLE BLU L60MM KS STR REV 8623H

## (undated) DEVICE — Device: Brand: NAKAO QUICKTRAP

## (undated) DEVICE — LABEL MED MINI W/ MARKER

## (undated) DEVICE — SYRINGE MED 10ML LUERLOCK TIP W/O SFTY DISP

## (undated) DEVICE — GLOVE ORANGE PI 7 1/2   MSG9075

## (undated) DEVICE — COUNTER NDL 40 COUNT HLD 70 FOAM BLK ADH W/ MAG

## (undated) DEVICE — GAUZE,SPONGE,2"X2",8PLY,STERILE,LF,2'S: Brand: MEDLINE